# Patient Record
Sex: FEMALE | Race: WHITE | NOT HISPANIC OR LATINO | Employment: OTHER | URBAN - NONMETROPOLITAN AREA
[De-identification: names, ages, dates, MRNs, and addresses within clinical notes are randomized per-mention and may not be internally consistent; named-entity substitution may affect disease eponyms.]

---

## 2018-02-16 ENCOUNTER — CONSULT (OUTPATIENT)
Dept: GASTROENTEROLOGY | Facility: CLINIC | Age: 73
End: 2018-02-16

## 2018-02-16 VITALS
HEART RATE: 80 BPM | SYSTOLIC BLOOD PRESSURE: 174 MMHG | BODY MASS INDEX: 40.27 KG/M2 | OXYGEN SATURATION: 97 % | DIASTOLIC BLOOD PRESSURE: 93 MMHG | HEIGHT: 55 IN | WEIGHT: 174 LBS

## 2018-02-16 DIAGNOSIS — K21.9 GASTROESOPHAGEAL REFLUX DISEASE, ESOPHAGITIS PRESENCE NOT SPECIFIED: ICD-10-CM

## 2018-02-16 DIAGNOSIS — D50.9 IRON DEFICIENCY ANEMIA, UNSPECIFIED IRON DEFICIENCY ANEMIA TYPE: Primary | ICD-10-CM

## 2018-02-16 DIAGNOSIS — R10.817 GENERALIZED ABDOMINAL TENDERNESS WITHOUT REBOUND TENDERNESS: ICD-10-CM

## 2018-02-16 DIAGNOSIS — Z86.19 HISTORY OF HELICOBACTER PYLORI INFECTION: ICD-10-CM

## 2018-02-16 DIAGNOSIS — R19.5 POSITIVE FIT (FECAL IMMUNOCHEMICAL TEST): ICD-10-CM

## 2018-02-16 DIAGNOSIS — R19.8 ABNORMAL ABDOMINAL EXAM: ICD-10-CM

## 2018-02-16 PROCEDURE — 99204 OFFICE O/P NEW MOD 45 MIN: CPT | Performed by: PHYSICIAN ASSISTANT

## 2018-02-16 RX ORDER — NAPROXEN 500 MG/1
500 TABLET ORAL 2 TIMES DAILY WITH MEALS
COMMUNITY
End: 2023-03-30 | Stop reason: ALTCHOICE

## 2018-02-16 RX ORDER — OMEPRAZOLE 20 MG/1
20 CAPSULE, DELAYED RELEASE ORAL DAILY
COMMUNITY
End: 2023-03-30 | Stop reason: DRUGHIGH

## 2018-02-16 RX ORDER — LORATADINE 10 MG/1
CAPSULE, LIQUID FILLED ORAL DAILY
COMMUNITY

## 2018-02-16 RX ORDER — METOPROLOL TARTRATE 50 MG/1
50 TABLET, FILM COATED ORAL 2 TIMES DAILY
COMMUNITY
End: 2023-03-30 | Stop reason: ALTCHOICE

## 2018-02-16 RX ORDER — CHOLECALCIFEROL (VITAMIN D3) 125 MCG
500 CAPSULE ORAL DAILY
COMMUNITY

## 2018-02-16 RX ORDER — LEVOTHYROXINE SODIUM 0.03 MG/1
25 TABLET ORAL DAILY
COMMUNITY

## 2018-02-16 RX ORDER — PHENOL 1.4 %
600 AEROSOL, SPRAY (ML) MUCOUS MEMBRANE DAILY
COMMUNITY
End: 2023-03-30 | Stop reason: ALTCHOICE

## 2018-02-16 RX ORDER — ASPIRIN 325 MG
325 TABLET ORAL DAILY
COMMUNITY

## 2018-02-16 RX ORDER — AMLODIPINE BESYLATE 5 MG/1
5 TABLET ORAL DAILY
COMMUNITY
End: 2023-03-30

## 2018-02-16 RX ORDER — FERROUS SULFATE 325(65) MG
325 TABLET ORAL
COMMUNITY

## 2018-02-16 RX ORDER — SIMVASTATIN 20 MG
20 TABLET ORAL NIGHTLY
COMMUNITY
End: 2023-03-30 | Stop reason: SDUPTHER

## 2018-02-16 RX ORDER — NIACIN 500 MG
500 TABLET ORAL NIGHTLY
COMMUNITY

## 2018-02-16 RX ORDER — AMPICILLIN TRIHYDRATE 250 MG
500 CAPSULE ORAL DAILY
COMMUNITY

## 2018-02-16 RX ORDER — ALENDRONATE SODIUM 70 MG/1
70 TABLET ORAL
COMMUNITY
Start: 2018-01-17

## 2018-02-16 RX ORDER — MONTELUKAST SODIUM 10 MG/1
10 TABLET ORAL NIGHTLY
COMMUNITY

## 2018-02-16 RX ORDER — OXYBUTYNIN CHLORIDE 5 MG/1
5 TABLET ORAL DAILY
COMMUNITY

## 2018-02-16 RX ORDER — CAPTOPRIL 12.5 MG/1
12.5 TABLET ORAL DAILY
COMMUNITY
Start: 2018-01-17 | End: 2023-03-30 | Stop reason: SDUPTHER

## 2018-02-16 RX ORDER — CALCIUM CARBONATE 500(1250)
TABLET ORAL DAILY
COMMUNITY

## 2018-02-16 NOTE — PROGRESS NOTES
Chief Complaint   Patient presents with   • Colon Cancer Screening     positive stool cards     Aggie Perkins is a 72 y.o. female who presents to the office today as a consultation at the request of JELENA Tai for Colon Cancer Screening (positive stool cards).    HPI  Today, she reports positive stool cards (FIT test) with her PCP in November 2017. She has not seen any obvious rectal bleeding and no melena. Appetite is good. No hemorrhoids that she is aware of. There is history of anemia and she is now taking iron. No history of blood transfusion. BMs are described as daily and normal. Denies abdominal pain, nausea or vomiting. Reports GERD and she takes Prilosec 20 mg once daily which seems to be controlling it. Last colonoscopy was 2-3 years ago. EGD was prior to that. She has had H pylori in the past and underwent treatment for it. The procedure was performed by Dr. Venegas and she reports history of colon polyps. There is no known family history of colon cancer or colon polyps.    No recent abdominal imaging. S/p cholecystectomy in 1980. Had maternal niece who had pancreatic cancer.     Review of Systems   Constitutional: Negative for chills and fever.   HENT: Negative for trouble swallowing.    Eyes: Negative for visual disturbance.   Respiratory: Negative for shortness of breath.    Cardiovascular: Negative for chest pain.   Gastrointestinal: Negative for abdominal distention, abdominal pain, anal bleeding, blood in stool, constipation, diarrhea, nausea and vomiting.   Genitourinary: Negative for difficulty urinating.   Neurological: Negative for dizziness and headaches.   Hematological: Bruises/bleeds easily.     Past Medical History:   Diagnosis Date   • Diabetes mellitus    • Hyperlipidemia    • Hypertension      Past Surgical History:   Procedure Laterality Date   • CHOLECYSTECTOMY     • COLONOSCOPY     • ENDOSCOPY     • HYSTERECTOMY       Family History   Problem Relation Age of Onset   • Colon cancer  Mother    • Colon cancer Father    • Colon cancer Brother      Social History   Substance Use Topics   • Smoking status: Never Smoker   • Smokeless tobacco: Never Used   • Alcohol use No       CURRENT MEDICATION:  •  alendronate (FOSAMAX) 70 MG tablet, , Disp: , Rfl:   •  amLODIPine (NORVASC) 5 MG tablet, Take 5 mg by mouth Daily., Disp: , Rfl:   •  aspirin 325 MG tablet, Take 325 mg by mouth Daily., Disp: , Rfl:   •  calcium carbonate (OS-MAXIMILIANO) 600 MG tablet, Take 600 mg by mouth Daily., Disp: , Rfl:   •  calcium carbonate 500 MG tablet tablet, Take  by mouth 2 (Two) Times a Day., Disp: , Rfl:   •  captopril (CAPOTEN) 12.5 MG tablet, , Disp: , Rfl:   •  Cinnamon 500 MG capsule, Take 500 mg by mouth Daily., Disp: , Rfl:   •  ferrous sulfate 325 (65 FE) MG tablet, Take 325 mg by mouth Daily With Breakfast., Disp: , Rfl:   •  levothyroxine (SYNTHROID, LEVOTHROID) 25 MCG tablet, Take 25 mcg by mouth Daily., Disp: , Rfl:   •  Loratadine 10 MG capsule, Take  by mouth., Disp: , Rfl:   •  metFORMIN (GLUCOPHAGE) 1000 MG tablet, Take 1,000 mg by mouth 2 (Two) Times a Day With Meals., Disp: , Rfl:   •  metoprolol tartrate (LOPRESSOR) 50 MG tablet, Take 50 mg by mouth 2 (Two) Times a Day., Disp: , Rfl:   •  montelukast (SINGULAIR) 10 MG tablet, Take 10 mg by mouth Every Night., Disp: , Rfl:   •  naproxen (NAPROSYN) 500 MG tablet, Take 500 mg by mouth 2 (Two) Times a Day With Meals., Disp: , Rfl:   •  niacin 500 MG tablet, Take 500 mg by mouth Every Night., Disp: , Rfl:   •  omeprazole (priLOSEC) 20 MG capsule, Take 20 mg by mouth Daily., Disp: , Rfl:   •  oxybutynin (DITROPAN) 5 MG tablet, Take 5 mg by mouth 3 (Three) Times a Day., Disp: , Rfl:   •  simvastatin (ZOCOR) 20 MG tablet, Take 20 mg by mouth Every Night., Disp: , Rfl:   •  vitamin B-12 (CYANOCOBALAMIN) 500 MCG tablet, Take 500 mcg by mouth Daily., Disp: , Rfl:     ALLERGIES:  Review of patient's allergies indicates no known allergies.    VISIT VITALS:  /93   "Pulse 80  Ht 134.6 cm (53\")  Wt 78.9 kg (174 lb)  SpO2 97%  BMI 43.55 kg/m2    Physical Exam   Constitutional: She is oriented to person, place, and time. She appears well-developed and well-nourished. No distress.   HENT:   Head: Normocephalic and atraumatic.   Right Ear: External ear normal.   Left Ear: External ear normal.   Nose: Nose normal.   Mouth/Throat: Oropharynx is clear and moist.   Eyes: Conjunctivae and EOM are normal. Right eye exhibits no discharge. Left eye exhibits no discharge. No scleral icterus.   Neck: Normal range of motion. Neck supple.   Cardiovascular: Normal rate, regular rhythm and normal heart sounds.  Exam reveals no gallop and no friction rub.    No murmur heard.  Pulmonary/Chest: Effort normal and breath sounds normal. No respiratory distress. She has no wheezes. She has no rales. She exhibits no tenderness.   Abdominal: Soft. Normal appearance and bowel sounds are normal. She exhibits no distension, no ascites and no mass. There is tenderness (tenderness, generalized (worst in LUQ)). There is no rigidity and no guarding. No hernia.   Abnormal swelling palpated in LUQ, possible splenomegaly   Musculoskeletal: Normal range of motion. She exhibits no edema or deformity.   Neurological: She is alert and oriented to person, place, and time. She exhibits normal muscle tone. Coordination normal.   Skin: Skin is warm and dry. No rash noted. No erythema. No pallor.   Psychiatric: She has a normal mood and affect. Her behavior is normal. Judgment and thought content normal.   Nursing note and vitals reviewed.      Assessment/Plan     1. Iron deficiency anemia, unspecified iron deficiency anemia type    2. Positive FIT (fecal immunochemical test)    3. Gastroesophageal reflux disease, esophagitis presence not specified    4. History of Helicobacter pylori infection    5. Generalized abdominal tenderness without rebound tenderness    6. Abnormal abdominal exam      Continue Prilosec 20 mg " once daily for management of GERD.     Orders Placed This Encounter   Procedures   • US Abdomen Complete     New Medications Ordered This Visit   Medications   • polyethylene glycol (GoLYTELY) 236 g solution     Sig: Starting at 6pm the day before procedure, drink 8 ounces every 30 minutes until all gone or stools are clear. May add flavor packet.     Dispense:  4000 mL     Refill:  0     ESOPHAGOGASTRODUODENOSCOPY WITH BIOPSY CPT CODE: 17644 (N/A), COLONOSCOPY CPT CODE: 62778 (N/A)  She will need an esophagogastroduodenoscopy and colonoscopy performed with IV general sedation. All of the risks, benefits and alternatives of these procedures have been discussed with her, all of her questions have been answered and she has elected to proceed. She should follow up in the office after these procedures to discuss the results and further recommendations can be made at that time.    Patient's BMI is above normal parameters. Follow-up plan includes:  educational material.        Return for follow up after procedure to discuss results if needed.      Electronically signed 2/19/2018 9:23 AM  Delia Henderson PA-C, [facility]

## 2018-02-19 PROBLEM — R19.5 POSITIVE FIT (FECAL IMMUNOCHEMICAL TEST): Status: ACTIVE | Noted: 2018-02-19

## 2018-02-19 PROBLEM — Z86.19 HISTORY OF HELICOBACTER PYLORI INFECTION: Status: ACTIVE | Noted: 2018-02-19

## 2018-02-19 PROBLEM — K21.9 GASTROESOPHAGEAL REFLUX DISEASE: Status: ACTIVE | Noted: 2018-02-19

## 2018-02-19 PROBLEM — D50.9 IRON DEFICIENCY ANEMIA: Status: ACTIVE | Noted: 2018-02-19

## 2018-03-13 DIAGNOSIS — R19.8 ABNORMAL ABDOMINAL EXAM: ICD-10-CM

## 2018-03-13 DIAGNOSIS — R10.817 GENERALIZED ABDOMINAL TENDERNESS WITHOUT REBOUND TENDERNESS: ICD-10-CM

## 2018-03-23 ENCOUNTER — ANESTHESIA (OUTPATIENT)
Dept: PERIOP | Facility: HOSPITAL | Age: 73
End: 2018-03-23

## 2018-03-23 ENCOUNTER — HOSPITAL ENCOUNTER (OUTPATIENT)
Facility: HOSPITAL | Age: 73
Setting detail: HOSPITAL OUTPATIENT SURGERY
Discharge: HOME | End: 2018-03-23
Attending: INTERNAL MEDICINE | Admitting: INTERNAL MEDICINE

## 2018-03-23 ENCOUNTER — ANESTHESIA EVENT (OUTPATIENT)
Dept: PERIOP | Facility: HOSPITAL | Age: 73
End: 2018-03-23

## 2018-03-23 VITALS
TEMPERATURE: 97.7 F | DIASTOLIC BLOOD PRESSURE: 78 MMHG | OXYGEN SATURATION: 94 % | SYSTOLIC BLOOD PRESSURE: 135 MMHG | HEART RATE: 88 BPM | HEIGHT: 57 IN | RESPIRATION RATE: 20 BRPM | WEIGHT: 170 LBS | BODY MASS INDEX: 36.68 KG/M2

## 2018-03-23 DIAGNOSIS — Z86.19 HISTORY OF HELICOBACTER PYLORI INFECTION: ICD-10-CM

## 2018-03-23 DIAGNOSIS — K21.9 GASTROESOPHAGEAL REFLUX DISEASE, ESOPHAGITIS PRESENCE NOT SPECIFIED: ICD-10-CM

## 2018-03-23 DIAGNOSIS — D50.9 IRON DEFICIENCY ANEMIA, UNSPECIFIED IRON DEFICIENCY ANEMIA TYPE: ICD-10-CM

## 2018-03-23 DIAGNOSIS — R19.5 POSITIVE FIT (FECAL IMMUNOCHEMICAL TEST): ICD-10-CM

## 2018-03-23 PROCEDURE — 88305 TISSUE EXAM BY PATHOLOGIST: CPT | Performed by: INTERNAL MEDICINE

## 2018-03-23 PROCEDURE — 43239 EGD BIOPSY SINGLE/MULTIPLE: CPT | Performed by: INTERNAL MEDICINE

## 2018-03-23 PROCEDURE — 25010000002 PROPOFOL 10 MG/ML EMULSION: Performed by: NURSE ANESTHETIST, CERTIFIED REGISTERED

## 2018-03-23 PROCEDURE — 88342 IMHCHEM/IMCYTCHM 1ST ANTB: CPT | Performed by: INTERNAL MEDICINE

## 2018-03-23 PROCEDURE — 45378 DIAGNOSTIC COLONOSCOPY: CPT | Performed by: INTERNAL MEDICINE

## 2018-03-23 PROCEDURE — 25010000002 PROPOFOL 1000 MG/ML EMULSION: Performed by: NURSE ANESTHETIST, CERTIFIED REGISTERED

## 2018-03-23 RX ORDER — SODIUM CHLORIDE, SODIUM LACTATE, POTASSIUM CHLORIDE, CALCIUM CHLORIDE 600; 310; 30; 20 MG/100ML; MG/100ML; MG/100ML; MG/100ML
125 INJECTION, SOLUTION INTRAVENOUS CONTINUOUS
Status: DISCONTINUED | OUTPATIENT
Start: 2018-03-23 | End: 2018-03-23 | Stop reason: HOSPADM

## 2018-03-23 RX ORDER — PROPOFOL 10 MG/ML
VIAL (ML) INTRAVENOUS AS NEEDED
Status: DISCONTINUED | OUTPATIENT
Start: 2018-03-23 | End: 2018-03-23 | Stop reason: SURG

## 2018-03-23 RX ORDER — ONDANSETRON 2 MG/ML
4 INJECTION INTRAMUSCULAR; INTRAVENOUS ONCE AS NEEDED
Status: DISCONTINUED | OUTPATIENT
Start: 2018-03-23 | End: 2018-03-23 | Stop reason: SDUPTHER

## 2018-03-23 RX ORDER — OXYCODONE HYDROCHLORIDE AND ACETAMINOPHEN 5; 325 MG/1; MG/1
1 TABLET ORAL ONCE AS NEEDED
Status: DISCONTINUED | OUTPATIENT
Start: 2018-03-23 | End: 2018-03-23 | Stop reason: SDUPTHER

## 2018-03-23 RX ORDER — GLYCOPYRROLATE 0.2 MG/ML
INJECTION INTRAMUSCULAR; INTRAVENOUS AS NEEDED
Status: DISCONTINUED | OUTPATIENT
Start: 2018-03-23 | End: 2018-03-23 | Stop reason: SURG

## 2018-03-23 RX ORDER — MIDAZOLAM HYDROCHLORIDE 1 MG/ML
2 INJECTION INTRAMUSCULAR; INTRAVENOUS
Status: DISCONTINUED | OUTPATIENT
Start: 2018-03-23 | End: 2018-03-23 | Stop reason: HOSPADM

## 2018-03-23 RX ORDER — OXYCODONE HYDROCHLORIDE AND ACETAMINOPHEN 5; 325 MG/1; MG/1
1 TABLET ORAL ONCE AS NEEDED
Status: DISCONTINUED | OUTPATIENT
Start: 2018-03-23 | End: 2018-03-23 | Stop reason: HOSPADM

## 2018-03-23 RX ORDER — LIDOCAINE HYDROCHLORIDE 10 MG/ML
INJECTION, SOLUTION INFILTRATION; PERINEURAL AS NEEDED
Status: DISCONTINUED | OUTPATIENT
Start: 2018-03-23 | End: 2018-03-23 | Stop reason: SURG

## 2018-03-23 RX ORDER — MEPERIDINE HYDROCHLORIDE 50 MG/ML
12.5 INJECTION INTRAMUSCULAR; INTRAVENOUS; SUBCUTANEOUS
Status: DISCONTINUED | OUTPATIENT
Start: 2018-03-23 | End: 2018-03-23 | Stop reason: SDUPTHER

## 2018-03-23 RX ORDER — FENTANYL CITRATE 50 UG/ML
50 INJECTION, SOLUTION INTRAMUSCULAR; INTRAVENOUS
Status: DISCONTINUED | OUTPATIENT
Start: 2018-03-23 | End: 2018-03-23 | Stop reason: SDUPTHER

## 2018-03-23 RX ORDER — SODIUM CHLORIDE 0.9 % (FLUSH) 0.9 %
1-10 SYRINGE (ML) INJECTION AS NEEDED
Status: DISCONTINUED | OUTPATIENT
Start: 2018-03-23 | End: 2018-03-23 | Stop reason: HOSPADM

## 2018-03-23 RX ORDER — MIDAZOLAM HYDROCHLORIDE 1 MG/ML
1 INJECTION INTRAMUSCULAR; INTRAVENOUS
Status: DISCONTINUED | OUTPATIENT
Start: 2018-03-23 | End: 2018-03-23 | Stop reason: HOSPADM

## 2018-03-23 RX ORDER — ONDANSETRON 2 MG/ML
4 INJECTION INTRAMUSCULAR; INTRAVENOUS ONCE AS NEEDED
Status: DISCONTINUED | OUTPATIENT
Start: 2018-03-23 | End: 2018-03-23 | Stop reason: HOSPADM

## 2018-03-23 RX ORDER — MEPERIDINE HYDROCHLORIDE 50 MG/ML
12.5 INJECTION INTRAMUSCULAR; INTRAVENOUS; SUBCUTANEOUS
Status: DISCONTINUED | OUTPATIENT
Start: 2018-03-23 | End: 2018-03-23 | Stop reason: HOSPADM

## 2018-03-23 RX ORDER — IPRATROPIUM BROMIDE AND ALBUTEROL SULFATE 2.5; .5 MG/3ML; MG/3ML
3 SOLUTION RESPIRATORY (INHALATION) ONCE AS NEEDED
Status: DISCONTINUED | OUTPATIENT
Start: 2018-03-23 | End: 2018-03-23 | Stop reason: SDUPTHER

## 2018-03-23 RX ORDER — FENTANYL CITRATE 50 UG/ML
50 INJECTION, SOLUTION INTRAMUSCULAR; INTRAVENOUS
Status: DISCONTINUED | OUTPATIENT
Start: 2018-03-23 | End: 2018-03-23 | Stop reason: HOSPADM

## 2018-03-23 RX ORDER — IPRATROPIUM BROMIDE AND ALBUTEROL SULFATE 2.5; .5 MG/3ML; MG/3ML
3 SOLUTION RESPIRATORY (INHALATION) ONCE AS NEEDED
Status: DISCONTINUED | OUTPATIENT
Start: 2018-03-23 | End: 2018-03-23 | Stop reason: HOSPADM

## 2018-03-23 RX ADMIN — SODIUM CHLORIDE, POTASSIUM CHLORIDE, SODIUM LACTATE AND CALCIUM CHLORIDE: 600; 310; 30; 20 INJECTION, SOLUTION INTRAVENOUS at 08:22

## 2018-03-23 RX ADMIN — PROPOFOL 100 MG: 10 INJECTION, EMULSION INTRAVENOUS at 08:22

## 2018-03-23 RX ADMIN — LIDOCAINE HYDROCHLORIDE 60 MG: 10 INJECTION, SOLUTION INFILTRATION; PERINEURAL at 08:22

## 2018-03-23 RX ADMIN — GLYCOPYRROLATE 0.2 MG: 0.2 INJECTION, SOLUTION INTRAMUSCULAR; INTRAVENOUS at 08:21

## 2018-03-23 RX ADMIN — PROPOFOL 100 MCG/KG/MIN: 10 INJECTION, EMULSION INTRAVENOUS at 08:22

## 2018-03-23 NOTE — H&P
History of presenting illness: 72-year-old white female presents for EGD and colonoscopy in order to investigate iron deficiency anemia, positive stool testing for occult blood.  She has history of GERD and colonic polyps.  Colonoscopy was last performed about 3 years ago.    Review of Systems:   Negative and noncontributory    Past History:  Past Medical History:   Diagnosis Date   • Arthritis    • Diabetes mellitus    • Disease of thyroid gland    • Hyperlipidemia    • Hypertension      Past Surgical History:   Procedure Laterality Date   • CHOLECYSTECTOMY     • COLONOSCOPY  2015   • ENDOSCOPY     • HYSTERECTOMY       Family History   Problem Relation Age of Onset   • Colon cancer Mother    • Colon cancer Father    • Colon cancer Brother      Social History     Social History   • Marital status: Single     Social History Main Topics   • Smoking status: Never Smoker   • Smokeless tobacco: Never Used   • Alcohol use No   • Drug use: No   • Sexual activity: Defer     Other Topics Concern   • Not on file     Prior to Admission medications    Medication Sig Start Date End Date Taking? Authorizing Provider   alendronate (FOSAMAX) 70 MG tablet  1/17/18  Yes Historical Provider, MD   amLODIPine (NORVASC) 5 MG tablet Take 5 mg by mouth Daily.   Yes Historical Provider, MD   aspirin 325 MG tablet Take 325 mg by mouth Daily.   Yes Historical Provider, MD   calcium carbonate (OS-MAXIMILIANO) 600 MG tablet Take 600 mg by mouth Daily.   Yes Historical Provider, MD   calcium carbonate 500 MG tablet tablet Take  by mouth 2 (Two) Times a Day.   Yes Historical Provider, MD   captopril (CAPOTEN) 12.5 MG tablet  1/17/18  Yes Historical Provider, MD   Cinnamon 500 MG capsule Take 500 mg by mouth Daily.   Yes Historical Provider, MD   ferrous sulfate 325 (65 FE) MG tablet Take 325 mg by mouth Daily With Breakfast.   Yes Historical Provider, MD   levothyroxine (SYNTHROID, LEVOTHROID) 25 MCG tablet Take 25 mcg by mouth Daily.   Yes Historical  Provider, MD   Loratadine 10 MG capsule Take  by mouth.   Yes Historical Provider, MD   metFORMIN (GLUCOPHAGE) 1000 MG tablet Take 1,000 mg by mouth 2 (Two) Times a Day With Meals.   Yes Historical Provider, MD   metoprolol tartrate (LOPRESSOR) 50 MG tablet Take 50 mg by mouth 2 (Two) Times a Day.   Yes Historical Provider, MD   montelukast (SINGULAIR) 10 MG tablet Take 10 mg by mouth Every Night.   Yes Historical Provider, MD   naproxen (NAPROSYN) 500 MG tablet Take 500 mg by mouth 2 (Two) Times a Day With Meals.   Yes Historical Provider, MD   niacin 500 MG tablet Take 500 mg by mouth Every Night.   Yes Historical Provider, MD   omeprazole (priLOSEC) 20 MG capsule Take 20 mg by mouth Daily.   Yes Historical Provider, MD   oxybutynin (DITROPAN) 5 MG tablet Take 5 mg by mouth 3 (Three) Times a Day.   Yes Historical Provider, MD   polyethylene glycol (GoLYTELY) 236 g solution Starting at 6pm the day before procedure, drink 8 ounces every 30 minutes until all gone or stools are clear. May add flavor packet. 2/16/18  Yes Delia Henderson PA-C   simvastatin (ZOCOR) 20 MG tablet Take 20 mg by mouth Every Night.   Yes Historical Provider, MD   vitamin B-12 (CYANOCOBALAMIN) 500 MCG tablet Take 500 mcg by mouth Daily.   Yes Historical Provider, MD       Current medication:  I have reviewed the list of current medications.    Allergies:   Review of patient's allergies indicates no known allergies.    Vital Signs  Temp:  [98.2 °F (36.8 °C)] 98.2 °F (36.8 °C)  Heart Rate:  [81] 81  Resp:  [20] 20  BP: (143)/(63) 143/63    Physical exam:  Alert, oriented ×3  HEENT: Normal  Neck: No mass  Chest: Clear  Heart: Regular rhythm  Abdomen: Soft, nontender, active BS  Extremities: No edema  Neuro: No focal deficit    Assessment/Plan:   Iron deficiency anemia  Positive stool testing for occult blood  GERD  History of colon polyp  Colon cancer screening    REC  EGD and screening/surveillance colonoscopy are planned.  The procedures,  benefits, risks and alternatives have been discussed with the patient.      Rinku Urias III, MD  03/23/18  8:14 AM

## 2018-03-23 NOTE — ANESTHESIA POSTPROCEDURE EVALUATION
Patient: Aggie Perkins    Procedure Summary     Date:  03/23/18 Room / Location:  [facility] OR [address] [place] [address] COR OR    Anesthesia Start:  0820 Anesthesia Stop:  0838    Procedures:       ESOPHAGOGASTRODUODENOSCOPY WITH BIOPSY CPT CODE: 60277 (N/A Esophagus)      COLONOSCOPY CPT CODE: 83183 (N/A ) Diagnosis:       History of Helicobacter pylori infection      Positive FIT (fecal immunochemical test)      Gastroesophageal reflux disease, esophagitis presence not specified      Iron deficiency anemia, unspecified iron deficiency anemia type      (History of Helicobacter pylori infection [Z86.19])      (Positive FIT (fecal immunochemical test) [R19.5])      (Gastroesophageal reflux disease, esophagitis presence not specified [K21.9])      (Iron deficiency anemia, unspecified iron deficiency anemia type [D50.9])    Surgeon:  Rinku Urias III, MD Provider:  John Thomas MD    Anesthesia Type:  general ASA Status:  3          Anesthesia Type: general  Last vitals  BP   120/69 (03/23/18 0850)   Temp   97.7 °F (36.5 °C) (03/23/18 0840)   Pulse   83 (03/23/18 0850)   Resp   20 (03/23/18 0850)     SpO2   97 % (03/23/18 0850)     Post Anesthesia Care and Evaluation    Patient location during evaluation: bedside  Patient participation: complete - patient participated  Level of consciousness: awake and alert  Pain score: 1  Pain management: adequate  Airway patency: patent  Anesthetic complications: No anesthetic complications  PONV Status: none  Cardiovascular status: acceptable  Respiratory status: acceptable  Hydration status: acceptable

## 2018-03-23 NOTE — OP NOTE
03/23/18    ESOPHAGOGASTRODUODENOSCOPY WITH BIOPSY, COLONOSCOPY  Procedure Note    Aggie Perkins  3/23/2018    Dr. Urias      Pre-op Diagnosis: Iron deficiency anemia, positive stool test for occult blood, GERD, history of colon polyps, colon cancer screening, last colonoscopy performed about 3 years ago      Post-op Diagnosis:   -Normal EGD  -Normal colonoscopy        Anesthesia: Per Anesthesia service, general anesthesia      Estimated Blood Loss: Negligible      Findings: EGD was performed with careful examination of the esophagus, stomach, duodenum to the fourth portion.  Question of minimal benign Garcia's mucosa in the distal esophagus at the EG junction--biopsies were obtained from the EG junction.  Aside from this, the examination was normal.  Biopsies were taken from the gastric antrum in order to check for H. pylori.    Rectal rectal examination.  Colonoscopy performed to the cecum, confirmed by identification of typical cecal anatomy.  Bowel preparation was fair but adequate to.  All areas were examined carefully.  The scope was retroflexed within the rectum.  No abnormality was seen.    She tolerated the procedures well and there were no complications.  She left the OR in stable and satisfactory condition.      Specimens: EG junction, gastric antrum      Grafts/Implants: N/A      Complications: None      Recommendations:   Findings discussed with the patient  Await biopsy findings  Repeat screening/surveillance colonoscopy in about 5 years      Rinku Urias III, MD     Date: 3/23/2018  Time: 8:43 AM     MIKE-Jo BOWLES

## 2018-03-23 NOTE — ANESTHESIA PREPROCEDURE EVALUATION
Anesthesia Evaluation     no history of anesthetic complications:  NPO Solid Status: > 8 hours  NPO Liquid Status: > 8 hours           Airway   Mallampati: II  TM distance: >3 FB  Neck ROM: full  No difficulty expected  Dental - normal exam   (+) upper dentures and lower dentures    Pulmonary - normal exam   Cardiovascular - normal exam    (+) hypertension, hyperlipidemia,       Neuro/Psych  GI/Hepatic/Renal/Endo    (+)  GERD,  diabetes mellitus type 2 well controlled,     Musculoskeletal     Abdominal  - normal exam   Substance History      OB/GYN          Other                        Anesthesia Plan    ASA 3     general     intravenous induction   Anesthetic plan and risks discussed with patient.

## 2018-03-28 LAB
LAB AP CASE REPORT: NORMAL
Lab: NORMAL
PATH REPORT.FINAL DX SPEC: NORMAL

## 2018-04-06 ENCOUNTER — OFFICE VISIT (OUTPATIENT)
Dept: GASTROENTEROLOGY | Facility: CLINIC | Age: 73
End: 2018-04-06

## 2018-04-06 VITALS
OXYGEN SATURATION: 92 % | BODY MASS INDEX: 37.88 KG/M2 | WEIGHT: 175.6 LBS | HEIGHT: 57 IN | SYSTOLIC BLOOD PRESSURE: 149 MMHG | HEART RATE: 78 BPM | DIASTOLIC BLOOD PRESSURE: 70 MMHG

## 2018-04-06 DIAGNOSIS — D50.9 IRON DEFICIENCY ANEMIA, UNSPECIFIED IRON DEFICIENCY ANEMIA TYPE: Primary | ICD-10-CM

## 2018-04-06 PROCEDURE — 99213 OFFICE O/P EST LOW 20 MIN: CPT | Performed by: PHYSICIAN ASSISTANT

## 2018-04-06 NOTE — PROGRESS NOTES
"Chief Complaint   Patient presents with   • Other     procedure follow up       Aggie Perkins is a 72 y.o. female who presents to the office today for follow up appointment regarding recent procedure.     HPI  She had EGD and colonoscopy recently which were performed by Dr. Urias on 3/23/2018 which were both reported as normal other than, \"question of minimal benign Garcia's mucosa in the distal esophagus at the EG junction.\"    Had US abd on 3/8/2018 in [place] which was normal other than previous cholecystectomy. Had positive stool cards (FIT test) with her PCP in November 2017. She has not seen any obvious rectal bleeding and no melena. Appetite is good. No hemorrhoids that she is aware of. There is history of anemia and she is now taking iron. No history of blood transfusion. BMs are described as daily and normal. Denies abdominal pain, nausea or vomiting. Reports GERD and she takes Prilosec 20 mg once daily which seems to be controlling it.     Previous colonoscopy was 2-3 years ago. EGD was prior to that. She has had H pylori in the past and underwent treatment for it. The procedure was performed by Dr. Venegas and she reports history of colon polyps. There is no known family history of colon cancer or colon polyps. S/p cholecystectomy in 1980. Had maternal niece who had pancreatic cancer.     Review of Systems   Constitutional: Positive for fatigue. Negative for chills and fever.   HENT: Negative for trouble swallowing.    Eyes: Negative for visual disturbance.   Respiratory: Negative for shortness of breath.    Cardiovascular: Negative for chest pain.   Gastrointestinal: Negative for abdominal distention, abdominal pain, anal bleeding, blood in stool, constipation, diarrhea, nausea and vomiting.   Genitourinary: Negative for difficulty urinating.   Neurological: Negative for dizziness and headaches.   Hematological: Does not bruise/bleed easily.       Specialty Problems        Gastroenterology Problems    " Gastroesophageal reflux disease            Past Medical History:   Diagnosis Date   • Arthritis    • Diabetes mellitus    • Disease of thyroid gland    • Hyperlipidemia    • Hypertension      Past Surgical History:   Procedure Laterality Date   • CHOLECYSTECTOMY     • COLONOSCOPY  2015   • COLONOSCOPY N/A 3/23/2018    Procedure: COLONOSCOPY CPT CODE: 67989;  Surgeon: Rinku Urias III, MD;  Location: [facility];  Service: Gastroenterology   • ENDOSCOPY     • ENDOSCOPY N/A 3/23/2018    Procedure: ESOPHAGOGASTRODUODENOSCOPY WITH BIOPSY CPT CODE: 40750;  Surgeon: Rinku Urias III, MD;  Location: [facility] OR;  Service: Gastroenterology   • HYSTERECTOMY       Family History   Problem Relation Age of Onset   • Colon cancer Mother    • Colon cancer Father    • Colon cancer Brother      Social History   Substance Use Topics   • Smoking status: Never Smoker   • Smokeless tobacco: Never Used   • Alcohol use No       CURRENT MEDICATION:  •  alendronate (FOSAMAX) 70 MG tablet, , Disp: , Rfl:   •  amLODIPine (NORVASC) 5 MG tablet, Take 5 mg by mouth Daily., Disp: , Rfl:   •  aspirin 325 MG tablet, Take 325 mg by mouth Daily., Disp: , Rfl:   •  calcium carbonate (OS-MAXIMILIANO) 600 MG tablet, Take 600 mg by mouth Daily., Disp: , Rfl:   •  calcium carbonate 500 MG tablet tablet, Take  by mouth 2 (Two) Times a Day., Disp: , Rfl:   •  captopril (CAPOTEN) 12.5 MG tablet, , Disp: , Rfl:   •  Cinnamon 500 MG capsule, Take 500 mg by mouth Daily., Disp: , Rfl:   •  ferrous sulfate 325 (65 FE) MG tablet, Take 325 mg by mouth Daily With Breakfast., Disp: , Rfl:   •  levothyroxine (SYNTHROID, LEVOTHROID) 25 MCG tablet, Take 25 mcg by mouth Daily., Disp: , Rfl:   •  Loratadine 10 MG capsule, Take  by mouth., Disp: , Rfl:   •  metFORMIN (GLUCOPHAGE) 1000 MG tablet, Take 1,000 mg by mouth 2 (Two) Times a Day With Meals., Disp: , Rfl:   •  metoprolol tartrate (LOPRESSOR) 50 MG tablet, Take 50 mg by mouth 2 (Two) Times a Day., Disp: , Rfl:  "  •  montelukast (SINGULAIR) 10 MG tablet, Take 10 mg by mouth Every Night., Disp: , Rfl:   •  naproxen (NAPROSYN) 500 MG tablet, Take 500 mg by mouth 2 (Two) Times a Day With Meals., Disp: , Rfl:   •  niacin 500 MG tablet, Take 500 mg by mouth Every Night., Disp: , Rfl:   •  omeprazole (priLOSEC) 20 MG capsule, Take 20 mg by mouth Daily., Disp: , Rfl:   •  oxybutynin (DITROPAN) 5 MG tablet, Take 5 mg by mouth 3 (Three) Times a Day., Disp: , Rfl:   •  polyethylene glycol (GoLYTELY) 236 g solution, Starting at 6pm the day before procedure, drink 8 ounces every 30 minutes until all gone or stools are clear. May add flavor packet., Disp: 4000 mL, Rfl: 0  •  simvastatin (ZOCOR) 20 MG tablet, Take 20 mg by mouth Every Night., Disp: , Rfl:   •  vitamin B-12 (CYANOCOBALAMIN) 500 MCG tablet, Take 500 mcg by mouth Daily., Disp: , Rfl:     ALLERGIES:  Review of patient's allergies indicates no known allergies.    VISIT VITALS:  /70   Pulse 78   Ht 144.8 cm (57\")   Wt 79.7 kg (175 lb 9.6 oz)   SpO2 92%   BMI 38.00 kg/m²     Physical Exam   Constitutional: She is oriented to person, place, and time. She appears well-developed and well-nourished. No distress.   HENT:   Head: Normocephalic and atraumatic.   Right Ear: External ear normal.   Left Ear: External ear normal.   Nose: Nose normal.   Mouth/Throat: Oropharynx is clear and moist.   Eyes: Conjunctivae and EOM are normal. Right eye exhibits no discharge. Left eye exhibits no discharge. No scleral icterus.   Neck: Normal range of motion. Neck supple.   Cardiovascular: Normal rate, regular rhythm and normal heart sounds.  Exam reveals no gallop and no friction rub.    No murmur heard.  Pulmonary/Chest: Effort normal and breath sounds normal. No respiratory distress. She has no wheezes. She has no rales. She exhibits no tenderness.   Abdominal: Soft. Normal appearance and bowel sounds are normal. She exhibits no distension, no ascites and no mass. There is " tenderness (tenderness, generalized (worst in LUQ)). There is no rigidity and no guarding. No hernia.   Abnormal swelling palpated in LUQ, possible splenomegaly   Musculoskeletal: Normal range of motion. She exhibits no edema or deformity.   Neurological: She is alert and oriented to person, place, and time. She exhibits normal muscle tone. Coordination normal.   Skin: Skin is warm and dry. No rash noted. No erythema. No pallor.   Psychiatric: She has a normal mood and affect. Her behavior is normal. Judgment and thought content normal.   Nursing note and vitals reviewed.      Assessment/Plan     1. Iron deficiency anemia, unspecified iron deficiency anemia type      No bleeding was noted on EGD or colonoscopy. She does not want to proceed with a PillCam at this time.     She will need a repeat colonoscopy in 5 years due to personal history of colon polyps. We will place her on the recall list to be called to schedule an appointment closer to that date. She was instructed to call the office if no reminder call is made within the proper number of years. Also, the indications for a repeat colonoscopy sooner than the recall date were discussed; rectal bleeding, melena, change in bowel habits or significant abdominal pain.          Return if symptoms worsen or fail to improve.      Electronically signed 4/18/2018 1:29 PM  Delia Henderson PA-C, [facility]

## 2019-11-25 ENCOUNTER — OUTSIDE FACILITY SERVICE (OUTPATIENT)
Dept: CARDIOLOGY | Facility: CLINIC | Age: 74
End: 2019-11-25

## 2019-11-25 PROCEDURE — 93018 CV STRESS TEST I&R ONLY: CPT | Performed by: INTERNAL MEDICINE

## 2019-11-25 PROCEDURE — 78452 HT MUSCLE IMAGE SPECT MULT: CPT | Performed by: INTERNAL MEDICINE

## 2023-03-08 ENCOUNTER — OUTSIDE FACILITY SERVICE (OUTPATIENT)
Dept: CARDIOLOGY | Facility: CLINIC | Age: 78
End: 2023-03-08
Payer: MEDICARE

## 2023-03-08 PROCEDURE — 99223 1ST HOSP IP/OBS HIGH 75: CPT | Performed by: INTERNAL MEDICINE

## 2023-03-09 ENCOUNTER — OUTSIDE FACILITY SERVICE (OUTPATIENT)
Dept: CARDIOLOGY | Facility: CLINIC | Age: 78
End: 2023-03-09
Payer: MEDICARE

## 2023-03-09 PROCEDURE — 99232 SBSQ HOSP IP/OBS MODERATE 35: CPT | Performed by: INTERNAL MEDICINE

## 2023-03-13 ENCOUNTER — OUTSIDE FACILITY SERVICE (OUTPATIENT)
Dept: CARDIOLOGY | Facility: CLINIC | Age: 78
End: 2023-03-13
Payer: MEDICARE

## 2023-03-13 ENCOUNTER — TELEPHONE (OUTPATIENT)
Dept: CARDIOLOGY | Facility: CLINIC | Age: 78
End: 2023-03-13
Payer: MEDICARE

## 2023-03-13 PROCEDURE — 93018 CV STRESS TEST I&R ONLY: CPT | Performed by: INTERNAL MEDICINE

## 2023-03-30 ENCOUNTER — OFFICE VISIT (OUTPATIENT)
Dept: CARDIOLOGY | Facility: CLINIC | Age: 78
End: 2023-03-30
Payer: MEDICARE

## 2023-03-30 VITALS
DIASTOLIC BLOOD PRESSURE: 56 MMHG | WEIGHT: 165.34 LBS | SYSTOLIC BLOOD PRESSURE: 104 MMHG | HEART RATE: 78 BPM | BODY MASS INDEX: 35.67 KG/M2 | HEIGHT: 57 IN

## 2023-03-30 DIAGNOSIS — I10 PRIMARY HYPERTENSION: ICD-10-CM

## 2023-03-30 DIAGNOSIS — E78.00 HYPERCHOLESTEREMIA: ICD-10-CM

## 2023-03-30 DIAGNOSIS — R06.02 SHORTNESS OF BREATH: Primary | ICD-10-CM

## 2023-03-30 DIAGNOSIS — E03.9 HYPOTHYROIDISM, UNSPECIFIED TYPE: ICD-10-CM

## 2023-03-30 DIAGNOSIS — E88.81 METABOLIC SYNDROME: ICD-10-CM

## 2023-03-30 DIAGNOSIS — I27.20 PHT (PULMONARY HYPERTENSION): ICD-10-CM

## 2023-03-30 DIAGNOSIS — E11.9 TYPE 2 DIABETES MELLITUS WITHOUT COMPLICATION, WITHOUT LONG-TERM CURRENT USE OF INSULIN: ICD-10-CM

## 2023-03-30 PROBLEM — E88.810 METABOLIC SYNDROME: Status: ACTIVE | Noted: 2023-03-30

## 2023-03-30 RX ORDER — TORSEMIDE 20 MG/1
20 TABLET ORAL DAILY
COMMUNITY
End: 2023-03-30 | Stop reason: SDUPTHER

## 2023-03-30 RX ORDER — CAPTOPRIL 12.5 MG/1
12.5 TABLET ORAL DAILY
Qty: 90 TABLET | Refills: 3 | Status: SHIPPED | OUTPATIENT
Start: 2023-03-30

## 2023-03-30 RX ORDER — OMEPRAZOLE 40 MG/1
40 CAPSULE, DELAYED RELEASE ORAL DAILY
COMMUNITY

## 2023-03-30 RX ORDER — SIMVASTATIN 20 MG
20 TABLET ORAL NIGHTLY
Qty: 90 TABLET | Refills: 3 | Status: SHIPPED | OUTPATIENT
Start: 2023-03-30

## 2023-03-30 RX ORDER — MAGNESIUM OXIDE 400 MG/1
400 TABLET ORAL DAILY
COMMUNITY

## 2023-03-30 RX ORDER — METOPROLOL SUCCINATE 100 MG/1
100 TABLET, EXTENDED RELEASE ORAL DAILY
COMMUNITY
End: 2023-03-30 | Stop reason: SDUPTHER

## 2023-03-30 RX ORDER — METOPROLOL SUCCINATE 100 MG/1
100 TABLET, EXTENDED RELEASE ORAL DAILY
Qty: 90 TABLET | Refills: 3 | Status: SHIPPED | OUTPATIENT
Start: 2023-03-30

## 2023-03-30 RX ORDER — TORSEMIDE 20 MG/1
20 TABLET ORAL DAILY
Qty: 90 TABLET | Refills: 3 | Status: SHIPPED | OUTPATIENT
Start: 2023-03-30

## 2023-03-30 RX ORDER — DAPAGLIFLOZIN 10 MG/1
TABLET, FILM COATED ORAL DAILY
COMMUNITY

## 2023-03-30 NOTE — PROGRESS NOTES
Chief Complaint   Patient presents with   • Hospital Follow Up Visit     Reports doing well since discharge. SOB and edema has improved.    • Med Refill     Refills needed on cardiac meds. 90 days to [facility] in [facility].   • Labs     Recently saw PCP, no labs since discharge. [facility] home health is checking labs next week.        CARDIAC COMPLAINTS  dyspnea and fatigue        Subjective   Aggie Perkins is a 77 y.o. female came in today for her hospital follow-up visit.  She was in the hospital few weeks ago with increasing shortness of breath and leg edema.  She has hypertension, diabetes, hypothyroidism and hypercholesterolemia.  She was found to be hypoxic and got admitted.  She was treated with diuretics and got better.  Her echocardiogram was read by another cardiologist as having mild aortic valvular stenosis and mild pulmonary hypertension.  Her stress test was reported by the radiologist as within normal limit.  She was initially put on sildenafil but apparently it was not covered by her insurance.  She was put on a higher dose of beta-blockers.  She came today stating that she is feeling little better.  Her shortness of breath and edema has improved.  She had 1 episode of desaturation and uses oxygen.  She does not use it all the time.  She has not had any labs done since discharge.  She denies having any chest pain.              Cardiac History  Past Surgical History:   Procedure Laterality Date   • CARDIOVASCULAR STRESS TEST  03/20/2023    @ [facility]. . [place]- EF 70%. negative   • CHOLECYSTECTOMY     • COLONOSCOPY  2015   • COLONOSCOPY N/A 03/23/2018    Procedure: COLONOSCOPY CPT CODE: 26953;  Surgeon: Rinku Urias III, MD;  Location: [facility];  Service: Gastroenterology   • ECHO - CONVERTED  03/06/2023    @ [facility]. - EF 65%SARITA-1.7 [id number]- 52 mmHg   • ENDOSCOPY     • ENDOSCOPY N/A 03/23/2018    Procedure: ESOPHAGOGASTRODUODENOSCOPY WITH BIOPSY CPT CODE: 49855;  Surgeon: Rinku  Vin Urias III, MD;  Location: [facility];  Service: Gastroenterology   • HYSTERECTOMY         Current Outpatient Medications   Medication Sig Dispense Refill   • alendronate (FOSAMAX) 70 MG tablet 1 tablet Every 7 (Seven) Days.     • aspirin 325 MG tablet Take 1 tablet by mouth Daily.     • calcium carbonate 500 MG tablet tablet Take  by mouth Daily.     • captopril (CAPOTEN) 12.5 MG tablet Take 1 tablet by mouth Daily. 90 tablet 3   • Cinnamon 500 MG capsule Take 1 capsule by mouth Daily.     • dapagliflozin Propanediol (Farxiga) 10 MG tablet Take  by mouth Daily.     • ferrous sulfate 325 (65 FE) MG tablet Take 1 tablet by mouth Daily With Breakfast.     • levothyroxine (SYNTHROID, LEVOTHROID) 25 MCG tablet Take 1 tablet by mouth Daily.     • linagliptin (TRADJENTA) 5 MG tablet tablet Take 1 tablet by mouth Daily.     • Loratadine 10 MG capsule Take  by mouth Daily.     • magnesium oxide (MAG-OX) 400 MG tablet Take 1 tablet by mouth Daily.     • metFORMIN (GLUCOPHAGE) 500 MG tablet Take 1 tablet by mouth 2 (Two) Times a Day With Meals.     • metoprolol succinate XL (TOPROL-XL) 100 MG 24 hr tablet Take 1 tablet by mouth Daily. 90 tablet 3   • montelukast (SINGULAIR) 10 MG tablet Take 1 tablet by mouth Every Night.     • niacin 500 MG tablet Take 1 tablet by mouth Every Night.     • O2 (OXYGEN) Inhale 2 L/min Daily As Needed.     • omeprazole (priLOSEC) 40 MG capsule Take 1 capsule by mouth Daily.     • oxybutynin (DITROPAN) 5 MG tablet Take 1 tablet by mouth Daily.     • simvastatin (ZOCOR) 20 MG tablet Take 1 tablet by mouth Every Night. 90 tablet 3   • torsemide (DEMADEX) 20 MG tablet Take 1 tablet by mouth Daily. 90 tablet 3   • vitamin B-12 (CYANOCOBALAMIN) 500 MCG tablet Take 1 tablet by mouth Daily.       No current facility-administered medications for this visit.       Allergies  :  Patient has no known allergies.       Past Medical History:   Diagnosis Date   • Arthritis    • Diabetes mellitus (HCC)    •  "Disease of thyroid gland    • Hyperlipidemia    • Hypertension    • PHT (pulmonary hypertension) ([facility]) 3/30/2023       Social History     Socioeconomic History   • Marital status: Single   Tobacco Use   • Smoking status: Never   • Smokeless tobacco: Never   Vaping Use   • Vaping Use: Never used   Substance and Sexual Activity   • Alcohol use: No   • Drug use: No   • Sexual activity: Defer       Family History   Problem Relation Age of Onset   • Colon cancer Mother    • Colon cancer Father    • Colon cancer Brother        Review of Systems   Constitutional: Positive for malaise/fatigue. Negative for decreased appetite.   HENT: Negative for congestion and sore throat.    Eyes: Negative for blurred vision, double vision and visual disturbance.   Cardiovascular: Positive for dyspnea on exertion. Negative for chest pain.   Respiratory: Positive for shortness of breath. Negative for snoring.    Endocrine: Negative for cold intolerance and heat intolerance.   Hematologic/Lymphatic: Negative for adenopathy. Does not bruise/bleed easily.   Skin: Negative for itching, nail changes and skin cancer.   Musculoskeletal: Negative for arthritis and myalgias.   Gastrointestinal: Negative for abdominal pain, dysphagia and heartburn.   Genitourinary: Negative for bladder incontinence and frequency.   Neurological: Negative for dizziness, seizures and vertigo.   Psychiatric/Behavioral: Negative for altered mental status.   Allergic/Immunologic: Negative for environmental allergies and hives.       Diabetes- Yes  Thyroid- abnormal    Objective     /56   Pulse 78   Ht 144.8 cm (57\")   Wt 75 kg (165 lb 5.5 oz)   BMI 35.78 kg/m²     Vitals and nursing note reviewed.   Constitutional:       Appearance: Healthy appearance. Not in distress.   Eyes:      Conjunctiva/sclera: Conjunctivae normal.      Pupils: Pupils are equal, round, and reactive to light.   HENT:      Head: Normocephalic.   Pulmonary:      Effort: Pulmonary effort is " normal.      Breath sounds: Normal breath sounds.   Cardiovascular:      PMI at left midclavicular line. Normal rate. Regular rhythm. loud S2.      Murmurs: There is a grade 3/6 high frequency blowing holosystolic murmur at the apex.   Abdominal:      General: Bowel sounds are normal.      Palpations: Abdomen is soft.   Musculoskeletal: Normal range of motion.      Cervical back: Normal range of motion and neck supple. Skin:     General: Skin is warm and dry.   Neurological:      Mental Status: Alert, oriented to person, place, and time and oriented to person, place and time.         ECG 12 Lead    Date/Time: 3/30/2023 3:30 PM  Performed by: Maxx Loving MD  Authorized by: Maxx Loving MD   Previous ECG: no previous ECG available  Rhythm: sinus rhythm  Rate: normal  Q waves: V1, V2, V3 and V4    QRS axis: normal  Other findings: low voltage    Clinical impression: abnormal EKG                    @ASSESSMENT/PLAN@  Class 2 Severe Obesity (BMI >=35 and <=39.9). Obesity-related health conditions include the following: obstructive sleep apnea, hypertension, diabetes mellitus and lower extremity venous stasis disease. Obesity is unchanged. BMI is is above average; BMI management plan is completed. We discussed low calorie, low carb based diet program, portion control and increasing exercise.     Diagnoses and all orders for this visit:    1. Shortness of breath (Primary)  -     torsemide (DEMADEX) 20 MG tablet; Take 1 tablet by mouth Daily.  Dispense: 90 tablet; Refill: 3  -     CBC & Differential; Future  -     BNP; Future    2. Primary hypertension  -     metoprolol succinate XL (TOPROL-XL) 100 MG 24 hr tablet; Take 1 tablet by mouth Daily.  Dispense: 90 tablet; Refill: 3  -     captopril (CAPOTEN) 12.5 MG tablet; Take 1 tablet by mouth Daily.  Dispense: 90 tablet; Refill: 3  -     torsemide (DEMADEX) 20 MG tablet; Take 1 tablet by mouth Daily.  Dispense: 90 tablet; Refill: 3  -     Comprehensive  Metabolic Panel; Future    3. Hypercholesteremia  -     simvastatin (ZOCOR) 20 MG tablet; Take 1 tablet by mouth Every Night.  Dispense: 90 tablet; Refill: 3  -     Lipid Panel; Future    4. PHT (pulmonary hypertension) (HCC)  -     torsemide (DEMADEX) 20 MG tablet; Take 1 tablet by mouth Daily.  Dispense: 90 tablet; Refill: 3  -     BNP; Future    5. Hypothyroidism, unspecified type    6. Type 2 diabetes mellitus without complication, without long-term current use of insulin (HCC)  -     Hemoglobin A1c; Future    7. Metabolic syndrome    At baseline her heart rate is stable.  Her blood pressure is lower limit of normal.  Her EKG shows normal sinus rhythm, small QRS complex, left anterior fascicular block, Q waves in the anterior leads.  Her clinical examination reveals a BMI of 36.  She does have a slightly loud second heart sound and a short systolic murmur at the mitral area.    Regarding the shortness of breath, it appears to be mostly from her diastolic dysfunction and mild pulm hypertension.  At this time we will continue the Demadex as well as the captopril.  I do like to recheck her echocardiogram in 6 to 8 months.  If the PA pressure is still elevated then we may have to put her on a calcium channel blockers in the form of Procardia XL.    Regarding her hypertension, it seems to be controlled with captopril, Toprol-XL and Demadex.  Continue the same.  She is advised to have her electrolytes and renal function checked again along with BNP level    Regarding her history of hypercholesterolemia, she is on Zocor.  Continue the same and have the lipid panel checked    Regarding her pulm hypertension, continue Demadex and captopril.  Recheck it in 6 months    Regarding her diabetes, she is on Farxiga, metformin and Tradjenta.  Continue the same and have the A1c level checked    Regarding her hypothyroidism, she is taking thyroid supplements.  Continue the same and have the TSH level checked    Regarding her  metabolic syndrome, talked to her about diet and weight reduction.  I gave her papers on Mediterranean diet.    Regarding advanced directive, I talked to her about living will and power of .  I did give her booklets regarding that    We will see her back in 6 months or sooner if needed                    Electronically signed by Maxx Loving MD March 30, 2023 15:24 EDT

## 2023-10-25 ENCOUNTER — OFFICE VISIT (OUTPATIENT)
Dept: CARDIOLOGY | Facility: CLINIC | Age: 78
End: 2023-10-25
Payer: MEDICARE

## 2023-10-25 VITALS
DIASTOLIC BLOOD PRESSURE: 60 MMHG | WEIGHT: 148.6 LBS | BODY MASS INDEX: 32.06 KG/M2 | SYSTOLIC BLOOD PRESSURE: 114 MMHG | HEIGHT: 57 IN | HEART RATE: 60 BPM

## 2023-10-25 DIAGNOSIS — N18.32 STAGE 3B CHRONIC KIDNEY DISEASE: ICD-10-CM

## 2023-10-25 DIAGNOSIS — E78.00 HYPERCHOLESTEREMIA: ICD-10-CM

## 2023-10-25 DIAGNOSIS — R06.02 SHORTNESS OF BREATH: ICD-10-CM

## 2023-10-25 DIAGNOSIS — E11.9 TYPE 2 DIABETES MELLITUS WITHOUT COMPLICATION, WITHOUT LONG-TERM CURRENT USE OF INSULIN: ICD-10-CM

## 2023-10-25 DIAGNOSIS — I10 PRIMARY HYPERTENSION: ICD-10-CM

## 2023-10-25 DIAGNOSIS — E03.9 HYPOTHYROIDISM, UNSPECIFIED TYPE: ICD-10-CM

## 2023-10-25 DIAGNOSIS — I27.20 PHT (PULMONARY HYPERTENSION): Primary | ICD-10-CM

## 2023-10-25 RX ORDER — METOPROLOL SUCCINATE 100 MG/1
100 TABLET, EXTENDED RELEASE ORAL DAILY
Qty: 90 TABLET | Refills: 3 | Status: SHIPPED | OUTPATIENT
Start: 2023-10-25

## 2023-10-25 RX ORDER — TORSEMIDE 20 MG/1
20 TABLET ORAL DAILY
Qty: 90 TABLET | Refills: 3 | Status: SHIPPED | OUTPATIENT
Start: 2023-10-25

## 2023-10-25 RX ORDER — CAPTOPRIL 12.5 MG/1
12.5 TABLET ORAL DAILY
Qty: 90 TABLET | Refills: 3 | Status: SHIPPED | OUTPATIENT
Start: 2023-10-25

## 2023-10-25 RX ORDER — SIMVASTATIN 20 MG
20 TABLET ORAL NIGHTLY
Qty: 90 TABLET | Refills: 3 | Status: SHIPPED | OUTPATIENT
Start: 2023-10-25

## 2023-10-25 NOTE — PROGRESS NOTES
Chief Complaint   Patient presents with    Follow-up     Cardiac management. Patient reports her BP has been doing good.    LABS     July 2023 results on chart  Had abnormal kidney function and has been referred to Nephrology    Med Refill     Requests refills [facility]       Aggie Perkins is a 78 y.o. female with HTN, DM, hypothyroidism, and hypercholesterolemia.  In early 2023 she was hospitalized, found to be hypoxic and treated with diuretics.  Echocardiogram read by another cardiologist showed mild AS and mild PTH.  Stress test per radiologist reported as normal.  Sildenafil initially prescribed but insurance did not cover.  Beta-blocker dose increased.    In June she was seen in the hospital secondary to confusion and found to be hypoxic.  Symptoms improved with supplemental oxygen.  Chest x-ray was without infiltrate.  Labs showed GFR 32, referred to nephrology.  CTA of head and neck showed 40% stenosis distal right common carotid artery    Today she returns to the office for follow-up visit.  Since June she has not had recurrence of symptoms.  At home she uses oxygen randomly during the day and most often at night.  Random SPO2 checks are done with lowest being 88.  In the morning she checks blood sugar and blood pressure.  Glucose this morning was 110.  Blood pressures remained stable.  No chest pain, palpitations, or edema noted.      Cardiac History:    Past Surgical History:   Procedure Laterality Date    CARDIOVASCULAR STRESS TEST  03/20/2023    @ [facility]. . [place]- EF 70%. negative    CHOLECYSTECTOMY      COLONOSCOPY  2015    COLONOSCOPY N/A 03/23/2018    Procedure: COLONOSCOPY CPT CODE: 09837;  Surgeon: Rinku Urias III, MD;  Location: [facility];  Service: Gastroenterology    ECHO - CONVERTED  03/06/2023    @ [facility]. - EF 65%SARITA-1.7 [id number]- 52 mmHg    ENDOSCOPY      ENDOSCOPY N/A 03/23/2018    Procedure: ESOPHAGOGASTRODUODENOSCOPY WITH BIOPSY CPT CODE:  45072;  Surgeon: Rinku Urias III, MD;  Location: [facility];  Service: Gastroenterology    HYSTERECTOMY         Current Outpatient Medications   Medication Sig Dispense Refill    alendronate (FOSAMAX) 70 MG tablet 1 tablet Every 7 (Seven) Days.      aspirin 325 MG tablet Take 1 tablet by mouth Daily.      calcium carbonate 500 MG tablet tablet Take  by mouth Daily.      captopril (CAPOTEN) 12.5 MG tablet Take 1 tablet by mouth Daily. 90 tablet 3    Cinnamon 500 MG capsule Take 1 capsule by mouth Daily.      dapagliflozin Propanediol (Farxiga) 10 MG tablet Take  by mouth Daily.      ferrous sulfate 325 (65 FE) MG tablet Take 1 tablet by mouth Daily With Breakfast.      levothyroxine (SYNTHROID, LEVOTHROID) 25 MCG tablet Take 1 tablet by mouth Daily.      linagliptin (TRADJENTA) 5 MG tablet tablet Take 1 tablet by mouth Daily.      Loratadine 10 MG capsule Take  by mouth Daily.      magnesium oxide (MAG-OX) 400 MG tablet Take 1 tablet by mouth Daily.      metFORMIN (GLUCOPHAGE) 500 MG tablet Take 1 tablet by mouth 2 (Two) Times a Day With Meals.      metoprolol succinate XL (TOPROL-XL) 100 MG 24 hr tablet Take 1 tablet by mouth Daily. 90 tablet 3    montelukast (SINGULAIR) 10 MG tablet Take 1 tablet by mouth Every Night.      niacin 500 MG tablet Take 1 tablet by mouth Every Night.      O2 (OXYGEN) Inhale 2 L/min Daily As Needed.      omeprazole (priLOSEC) 40 MG capsule Take 1 capsule by mouth Daily.      oxybutynin (DITROPAN) 5 MG tablet Take 1 tablet by mouth Daily.      simvastatin (ZOCOR) 20 MG tablet Take 1 tablet by mouth Every Night. 90 tablet 3    torsemide (DEMADEX) 20 MG tablet Take 1 tablet by mouth Daily. 90 tablet 3    vitamin B-12 (CYANOCOBALAMIN) 500 MCG tablet Take 1 tablet by mouth Daily.       No current facility-administered medications for this visit.       Patient has no known allergies.    Past Medical History:   Diagnosis Date    Arthritis     Diabetes mellitus     Disease of thyroid  gland     Hyperlipidemia     Hypertension     PHT (pulmonary hypertension) 3/30/2023       Social History     Socioeconomic History    Marital status: Single   Tobacco Use    Smoking status: Never    Smokeless tobacco: Never   Vaping Use    Vaping Use: Never used   Substance and Sexual Activity    Alcohol use: No    Drug use: No    Sexual activity: Defer       Family History   Problem Relation Age of Onset    Colon cancer Mother     Colon cancer Father     Colon cancer Brother        Review of Systems   Constitutional: Positive for malaise/fatigue (no worse).   HENT:  Negative for congestion and nosebleeds.    Cardiovascular:  Negative for chest pain, leg swelling, near-syncope and palpitations.   Respiratory:  Positive for shortness of breath and sleep disturbances due to breathing (uses supplemental at night).    Hematologic/Lymphatic: Negative for bleeding problem.   Skin:  Negative for color change.   Gastrointestinal:  Negative for change in bowel habit, melena and nausea.   Genitourinary:  Positive for nocturia. Negative for dysuria and hematuria.   Psychiatric/Behavioral:  Negative for memory loss. The patient does not have insomnia.         BP Readings from Last 5 Encounters:   10/25/23 114/60   03/30/23 104/56   04/06/18 149/70   03/23/18 135/78   02/16/18 174/93       Wt Readings from Last 5 Encounters:   10/25/23 67.4 kg (148 lb 9.6 oz)   03/30/23 75 kg (165 lb 5.5 oz)   04/06/18 79.7 kg (175 lb 9.6 oz)   03/23/18 77.1 kg (170 lb)   02/16/18 78.9 kg (174 lb)       Objective     Labs 7/7/2023 per PCP: WBC 10.10, RBC 3.95, hemoglobin 10.7, hematocrit 35.7, platelets 458, sodium 139, potassium 4.6, chloride 102,, oxide 32, glucose 97, BUN 39, creatinine 1.81, GFR 29, total protein 10.8, BUN 3.6, calcium 10.3, total bili 0.2, AST 20, ALT 13, , magnesium 1.9, triglycerides 90, total cholesterol 109, HDL 40, LDL 60, vitamin D 1891, free T41.33, TSH 3.055, A1c 5.9, vitamin D 40.6    /60 (BP  "Location: Left arm, Patient Position: Sitting)   Pulse 60   Ht 144.8 cm (57\")   Wt 67.4 kg (148 lb 9.6 oz)   BMI 32.16 kg/m²     Vitals and nursing note reviewed.   Constitutional:       Appearance: Not in distress.   Eyes:      Conjunctiva/sclera: Conjunctivae normal.   Neck:      Vascular: No carotid bruit.   Pulmonary:      Effort: Pulmonary effort is normal.      Breath sounds: Normal breath sounds. No rales.   Cardiovascular:      PMI at left midclavicular line. Regular rhythm.      Murmurs: There is no murmur.   Edema:     Peripheral edema absent.   Abdominal:      General: Bowel sounds are normal. There is distension (softly).      Palpations: Abdomen is soft.      Tenderness: There is no abdominal tenderness.   Musculoskeletal:      Cervical back: Neck supple. Skin:     General: Skin is warm and dry.      Coloration: Skin is not jaundiced or pale.   Neurological:      Mental Status: Alert and oriented to person, place and time.          Procedures: none today          Assessment & Plan   Diagnoses and all orders for this visit:    1. PHT (pulmonary hypertension) (Primary)  -     torsemide (DEMADEX) 20 MG tablet; Take 1 tablet by mouth Daily.  Dispense: 90 tablet; Refill: 3  -     Adult Transthoracic Echo Complete W/ Cont if Necessary Per Protocol; Future    2. Shortness of breath  -     torsemide (DEMADEX) 20 MG tablet; Take 1 tablet by mouth Daily.  Dispense: 90 tablet; Refill: 3  -     Adult Transthoracic Echo Complete W/ Cont if Necessary Per Protocol; Future    3. Primary hypertension  -     metoprolol succinate XL (TOPROL-XL) 100 MG 24 hr tablet; Take 1 tablet by mouth Daily.  Dispense: 90 tablet; Refill: 3  -     captopril (CAPOTEN) 12.5 MG tablet; Take 1 tablet by mouth Daily.  Dispense: 90 tablet; Refill: 3  -     torsemide (DEMADEX) 20 MG tablet; Take 1 tablet by mouth Daily.  Dispense: 90 tablet; Refill: 3    4. Hypercholesteremia  -     simvastatin (ZOCOR) 20 MG tablet; Take 1 tablet by mouth " Every Night.  Dispense: 90 tablet; Refill: 3    5. Type 2 diabetes mellitus without complication, without long-term current use of insulin    6. Hypothyroidism, unspecified type    7. Stage 3b chronic kidney disease      Pulmonary hypertension/shortness of breath  -Continue supplemental oxygen  -Continue diuretic, Demadex 20 mg daily  -Repeat echocardiogram.  If warranted consider addition of Procardia for management of PHT.    Primary hypertension  -BP stable  -Continue Captopril 12.5 daily, Toprol- mg daily, Demadex 20 mg daily    Diabetes  -Followed through PCP office  -Admits morning glucose levels controlled, most recent 110  -Currently on: Farxiga, metformin, Tradjenta    Hypothyroidism  -Takes Synthroid appropriately    Chronic kidney disease  -Scheduled to see nephrologist in near future  -At this time continue ACE inhibitor  -Consider Kerendia?     Further recommendations based on echocardiogram.    6-month follow-up visit scheduled.

## 2023-11-07 ENCOUNTER — HOSPITAL ENCOUNTER (OUTPATIENT)
Dept: CARDIOLOGY | Facility: HOSPITAL | Age: 78
Discharge: HOME | End: 2023-11-07
Admitting: NURSE PRACTITIONER
Payer: MEDICARE

## 2023-11-07 DIAGNOSIS — R06.02 SHORTNESS OF BREATH: ICD-10-CM

## 2023-11-07 DIAGNOSIS — I27.20 PHT (PULMONARY HYPERTENSION): ICD-10-CM

## 2023-11-07 LAB
BH CV ECHO MEAS - ACS: 1.77 CM
BH CV ECHO MEAS - AO MAX PG: 8.3 MMHG
BH CV ECHO MEAS - AO MEAN PG: 5 MMHG
BH CV ECHO MEAS - AO ROOT DIAM: 3 CM
BH CV ECHO MEAS - AO V2 MAX: 144 CM/SEC
BH CV ECHO MEAS - AO V2 VTI: 28.3 CM
BH CV ECHO MEAS - EDV(CUBED): 24.6 ML
BH CV ECHO MEAS - EDV(MOD-SP4): 56 ML
BH CV ECHO MEAS - EF(MOD-SP4): 59.5 %
BH CV ECHO MEAS - EF_3D-VOL: 59 %
BH CV ECHO MEAS - ESV(CUBED): 5.4 ML
BH CV ECHO MEAS - ESV(MOD-SP4): 22.7 ML
BH CV ECHO MEAS - FS: 39.9 %
BH CV ECHO MEAS - IVS/LVPW: 1.03 CM
BH CV ECHO MEAS - IVSD: 1.59 CM
BH CV ECHO MEAS - LA DIMENSION: 3.8 CM
BH CV ECHO MEAS - LAT PEAK E' VEL: 3.8 CM/SEC
BH CV ECHO MEAS - LV DIASTOLIC VOL/BSA (35-75): 35.4 CM2
BH CV ECHO MEAS - LV MASS(C)D: 164.3 GRAMS
BH CV ECHO MEAS - LV SYSTOLIC VOL/BSA (12-30): 14.3 CM2
BH CV ECHO MEAS - LVIDD: 2.9 CM
BH CV ECHO MEAS - LVIDS: 1.75 CM
BH CV ECHO MEAS - LVPWD: 1.55 CM
BH CV ECHO MEAS - MED PEAK E' VEL: 3 CM/SEC
BH CV ECHO MEAS - MV A MAX VEL: 170 CM/SEC
BH CV ECHO MEAS - MV DEC SLOPE: 305.5 CM/SEC2
BH CV ECHO MEAS - MV DEC TIME: 0.33 SEC
BH CV ECHO MEAS - MV E MAX VEL: 106 CM/SEC
BH CV ECHO MEAS - MV E/A: 0.62
BH CV ECHO MEAS - MV MAX PG: 11.9 MMHG
BH CV ECHO MEAS - MV MEAN PG: 5.1 MMHG
BH CV ECHO MEAS - MV P1/2T: 114.5 MSEC
BH CV ECHO MEAS - MV V2 VTI: 42.5 CM
BH CV ECHO MEAS - MVA(P1/2T): 1.92 CM2
BH CV ECHO MEAS - RVDD: 2.2 CM
BH CV ECHO MEAS - SI(MOD-SP4): 21 ML/M2
BH CV ECHO MEAS - SV(MOD-SP4): 33.3 ML
BH CV ECHO MEASUREMENTS AVERAGE E/E' RATIO: 31.18
BH CV XLRA - RV BASE: 3.2 CM
BH CV XLRA - RV LENGTH: 6.5 CM
BH CV XLRA - RV MID: 2.4 CM
LEFT ATRIUM VOLUME INDEX: 31.3 ML/M2

## 2023-11-07 PROCEDURE — 93306 TTE W/DOPPLER COMPLETE: CPT

## 2023-11-07 PROCEDURE — 93306 TTE W/DOPPLER COMPLETE: CPT | Performed by: INTERNAL MEDICINE

## 2024-02-07 ENCOUNTER — TRANSCRIBE ORDERS (OUTPATIENT)
Dept: ADMINISTRATIVE | Facility: HOSPITAL | Age: 79
End: 2024-02-07
Payer: MEDICARE

## 2024-02-07 DIAGNOSIS — N18.32 STAGE 3B CHRONIC KIDNEY DISEASE: Primary | ICD-10-CM

## 2024-03-14 ENCOUNTER — HOSPITAL ENCOUNTER (OUTPATIENT)
Dept: ULTRASOUND IMAGING | Facility: HOSPITAL | Age: 79
Discharge: HOME | End: 2024-03-14
Admitting: INTERNAL MEDICINE
Payer: MEDICARE

## 2024-03-14 DIAGNOSIS — N18.32 STAGE 3B CHRONIC KIDNEY DISEASE: ICD-10-CM

## 2024-03-14 PROCEDURE — 76775 US EXAM ABDO BACK WALL LIM: CPT | Performed by: RADIOLOGY

## 2024-03-14 PROCEDURE — 76775 US EXAM ABDO BACK WALL LIM: CPT

## 2024-03-21 ENCOUNTER — LAB (OUTPATIENT)
Dept: LAB | Facility: HOSPITAL | Age: 79
End: 2024-03-21
Payer: MEDICARE

## 2024-03-21 ENCOUNTER — TRANSCRIBE ORDERS (OUTPATIENT)
Dept: ADMINISTRATIVE | Facility: HOSPITAL | Age: 79
End: 2024-03-21
Payer: MEDICARE

## 2024-03-21 DIAGNOSIS — N18.32 CHRONIC KIDNEY DISEASE (CKD) STAGE G3B/A1, MODERATELY DECREASED GLOMERULAR FILTRATION RATE (GFR) BETWEEN 30-44 ML/MIN/1.73 SQUARE METER AND ALBUMINURIA CREATININE RATIO LESS THAN 30 MG/G (CMS/H*: ICD-10-CM

## 2024-03-21 DIAGNOSIS — N18.32 CHRONIC KIDNEY DISEASE (CKD) STAGE G3B/A1, MODERATELY DECREASED GLOMERULAR FILTRATION RATE (GFR) BETWEEN 30-44 ML/MIN/1.73 SQUARE METER AND ALBUMINURIA CREATININE RATIO LESS THAN 30 MG/G (CMS/H*: Primary | ICD-10-CM

## 2024-03-21 LAB
ALBUMIN SERPL-MCNC: 4 G/DL (ref 3.5–5.2)
ALBUMIN/GLOB SERPL: 1.2 G/DL
ALP SERPL-CCNC: 76 U/L (ref 39–117)
ALT SERPL W P-5'-P-CCNC: 9 U/L (ref 1–33)
ANION GAP SERPL CALCULATED.3IONS-SCNC: 12.6 MMOL/L (ref 5–15)
AST SERPL-CCNC: 13 U/L (ref 1–32)
BILIRUB SERPL-MCNC: 0.4 MG/DL (ref 0–1.2)
BUN SERPL-MCNC: 38 MG/DL (ref 8–23)
BUN/CREAT SERPL: 15.4 (ref 7–25)
CALCIUM SPEC-SCNC: 9.6 MG/DL (ref 8.6–10.5)
CHLORIDE SERPL-SCNC: 98 MMOL/L (ref 98–107)
CO2 SERPL-SCNC: 28.4 MMOL/L (ref 22–29)
CREAT SERPL-MCNC: 2.47 MG/DL (ref 0.57–1)
EGFRCR SERPLBLD CKD-EPI 2021: 19.8 ML/MIN/1.73
GLOBULIN UR ELPH-MCNC: 3.3 GM/DL
GLUCOSE SERPL-MCNC: 107 MG/DL (ref 65–99)
POTASSIUM SERPL-SCNC: 4.8 MMOL/L (ref 3.5–5.2)
PROT SERPL-MCNC: 7.3 G/DL (ref 6–8.5)
SODIUM SERPL-SCNC: 139 MMOL/L (ref 136–145)

## 2024-03-21 PROCEDURE — 84156 ASSAY OF PROTEIN URINE: CPT

## 2024-03-21 PROCEDURE — 81001 URINALYSIS AUTO W/SCOPE: CPT

## 2024-03-21 PROCEDURE — 82570 ASSAY OF URINE CREATININE: CPT

## 2024-03-21 PROCEDURE — 82043 UR ALBUMIN QUANTITATIVE: CPT

## 2024-03-21 PROCEDURE — 80053 COMPREHEN METABOLIC PANEL: CPT

## 2024-03-21 PROCEDURE — 36415 COLL VENOUS BLD VENIPUNCTURE: CPT

## 2024-03-21 PROCEDURE — 87086 URINE CULTURE/COLONY COUNT: CPT

## 2024-03-22 LAB
ALBUMIN UR-MCNC: 11.4 MG/DL
BACTERIA UR QL AUTO: ABNORMAL /HPF
BILIRUB UR QL STRIP: NEGATIVE
BILIRUB UR QL STRIP: NEGATIVE
CLARITY UR: CLEAR
CLARITY UR: CLEAR
COLOR UR: YELLOW
COLOR UR: YELLOW
CREAT UR-MCNC: 74 MG/DL
CREAT UR-MCNC: 74 MG/DL
GLUCOSE UR STRIP-MCNC: NEGATIVE MG/DL
GLUCOSE UR STRIP-MCNC: NEGATIVE MG/DL
HGB UR QL STRIP.AUTO: ABNORMAL
HGB UR QL STRIP.AUTO: ABNORMAL
HOLD SPECIMEN: NORMAL
HYALINE CASTS UR QL AUTO: ABNORMAL /LPF
KETONES UR QL STRIP: NEGATIVE
KETONES UR QL STRIP: NEGATIVE
LEUKOCYTE ESTERASE UR QL STRIP.AUTO: ABNORMAL
LEUKOCYTE ESTERASE UR QL STRIP.AUTO: ABNORMAL
MICROALBUMIN/CREAT UR: 154.1 MG/G (ref 0–29)
NITRITE UR QL STRIP: NEGATIVE
NITRITE UR QL STRIP: NEGATIVE
PH UR STRIP.AUTO: 6.5 [PH] (ref 5–8)
PH UR STRIP.AUTO: 6.5 [PH] (ref 5–8)
PROT ?TM UR-MCNC: 28.5 MG/DL
PROT UR QL STRIP: ABNORMAL
PROT UR QL STRIP: ABNORMAL
PROT/CREAT UR: 385.1 MG/G CREA (ref 0–200)
RBC # UR STRIP: ABNORMAL /HPF
REF LAB TEST METHOD: ABNORMAL
SP GR UR STRIP: 1.01 (ref 1–1.03)
SP GR UR STRIP: 1.01 (ref 1–1.03)
SQUAMOUS #/AREA URNS HPF: ABNORMAL /HPF
UROBILINOGEN UR QL STRIP: ABNORMAL
UROBILINOGEN UR QL STRIP: ABNORMAL
WBC # UR STRIP: ABNORMAL /HPF

## 2024-03-23 LAB — BACTERIA SPEC AEROBE CULT: NO GROWTH

## 2024-05-02 ENCOUNTER — OFFICE VISIT (OUTPATIENT)
Dept: CARDIOLOGY | Facility: CLINIC | Age: 79
End: 2024-05-02
Payer: MEDICARE

## 2024-05-02 VITALS
HEIGHT: 57 IN | SYSTOLIC BLOOD PRESSURE: 136 MMHG | HEART RATE: 71 BPM | WEIGHT: 146.4 LBS | DIASTOLIC BLOOD PRESSURE: 60 MMHG | BODY MASS INDEX: 31.59 KG/M2

## 2024-05-02 DIAGNOSIS — R06.02 SHORTNESS OF BREATH: ICD-10-CM

## 2024-05-02 DIAGNOSIS — E78.00 HYPERCHOLESTEREMIA: ICD-10-CM

## 2024-05-02 DIAGNOSIS — E03.9 HYPOTHYROIDISM, UNSPECIFIED TYPE: ICD-10-CM

## 2024-05-02 DIAGNOSIS — I27.20 PHT (PULMONARY HYPERTENSION): Primary | ICD-10-CM

## 2024-05-02 DIAGNOSIS — E11.9 TYPE 2 DIABETES MELLITUS WITHOUT COMPLICATION, WITHOUT LONG-TERM CURRENT USE OF INSULIN: ICD-10-CM

## 2024-05-02 DIAGNOSIS — I10 PRIMARY HYPERTENSION: ICD-10-CM

## 2024-05-02 DIAGNOSIS — N18.4 CKD (CHRONIC KIDNEY DISEASE) STAGE 4, GFR 15-29 ML/MIN: ICD-10-CM
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
## 2024-05-03 ENCOUNTER — TELEPHONE (OUTPATIENT)
Dept: CARDIOLOGY | Facility: CLINIC | Age: 79
End: 2024-05-03

## 2024-05-03 NOTE — TELEPHONE ENCOUNTER
Caller: NORMAN CALZADA    Relationship: Niece/Nephew    Best call back number: [phone]    What is the best time to reach you: ANYTIME    Who are you requesting to speak with (clinical staff, provider,  specific staff member): CLINICAL    Do you know the name of the person who called: N/A    What was the call regarding: PATIENTS NIECE WAS CALLING BACK WITH A LIST OF MEDICATION FOR PATIENT, PER REQUEST OF JELENA JAMIL.    ALENDRONATE 70 MG- EVERY 7 DAYS  ASPIRIN 325 MG-DAILY  CALCIUM CARBONATE 500 MG TABLET -DAILY  CAPTOPRIL 12.5 MG-DAILY  FERROUS SULFATE 325 MG DAILY  LEVOTHYROXINE 25 MCG TABLET-DAILY  LORATADINE 10 MG DAILY  MAGNESIUM OXIDE 400 MG TABLET- DAILY  METFORMIN 500 MG 4 TIMES DAILY  METOPROLOL  MG 24 HR TABLET- DAILY  SINGULAR 10 MG DAILY  O2-OXYGEN AS NEEDED  OMEPRAZOLE  40 MG DAILY  OXYBUTYNIN 5 MG DAILY  SIMVASTATIN 20 MG NIGHTLY    AMLODIPINE 10 MG --KIDNEY DR STOPPED    ADDED MEDICATIONS  TRADJENTA- 5MG   TORSEMIDE 20 MG DAILY    NOT TAKING  NOT TAKING CINNAMON  NOT TAKING FARXIGA 10 MG  NOT TAKING NIACIN 500 MG    Is it okay if the provider responds through MyChart: CALL

## 2024-07-03 ENCOUNTER — HOSPITAL ENCOUNTER (INPATIENT)
Facility: HOSPITAL | Age: 79
LOS: 7 days | Discharge: HOME HEALTH | End: 2024-07-10
Attending: EMERGENCY MEDICINE | Admitting: HOSPITALIST
Payer: MEDICARE

## 2024-07-03 ENCOUNTER — APPOINTMENT (OUTPATIENT)
Dept: GENERAL RADIOLOGY | Facility: HOSPITAL | Age: 79
End: 2024-07-03
Payer: MEDICARE

## 2024-07-03 ENCOUNTER — APPOINTMENT (OUTPATIENT)
Dept: CT IMAGING | Facility: HOSPITAL | Age: 79
End: 2024-07-03
Payer: MEDICARE

## 2024-07-03 DIAGNOSIS — N17.9 ACUTE RENAL FAILURE SUPERIMPOSED ON CHRONIC KIDNEY DISEASE, UNSPECIFIED ACUTE RENAL FAILURE TYPE, UNSPECIFIED CKD STAGE: Primary | ICD-10-CM

## 2024-07-03 DIAGNOSIS — R31.29 MICROSCOPIC HEMATURIA: ICD-10-CM

## 2024-07-03 DIAGNOSIS — N18.9 ACUTE RENAL FAILURE SUPERIMPOSED ON CHRONIC KIDNEY DISEASE, UNSPECIFIED ACUTE RENAL FAILURE TYPE, UNSPECIFIED CKD STAGE: Primary | ICD-10-CM

## 2024-07-03 DIAGNOSIS — R54 AGE-RELATED PHYSICAL DEBILITY: ICD-10-CM

## 2024-07-03 DIAGNOSIS — E88.810 METABOLIC SYNDROME: ICD-10-CM

## 2024-07-03 LAB
ALBUMIN SERPL-MCNC: 3.6 G/DL (ref 3.5–5.2)
ALBUMIN SERPL-MCNC: 3.9 G/DL (ref 3.5–5.2)
ALBUMIN/GLOB SERPL: 1 G/DL
ALBUMIN/GLOB SERPL: 1 G/DL
ALP SERPL-CCNC: 70 U/L (ref 39–117)
ALP SERPL-CCNC: 72 U/L (ref 39–117)
ALT SERPL W P-5'-P-CCNC: 5 U/L (ref 1–33)
ALT SERPL W P-5'-P-CCNC: 5 U/L (ref 1–33)
AMPHET+METHAMPHET UR QL: NEGATIVE
AMPHETAMINES UR QL: NEGATIVE
ANION GAP SERPL CALCULATED.3IONS-SCNC: 14 MMOL/L (ref 5–15)
ANION GAP SERPL CALCULATED.3IONS-SCNC: 14.4 MMOL/L (ref 5–15)
APTT PPP: 31.2 SECONDS (ref 26.5–34.5)
AST SERPL-CCNC: 11 U/L (ref 1–32)
AST SERPL-CCNC: 12 U/L (ref 1–32)
BACTERIA UR QL AUTO: ABNORMAL /HPF
BARBITURATES UR QL SCN: NEGATIVE
BASOPHILS # BLD AUTO: 0.03 10*3/MM3 (ref 0–0.2)
BASOPHILS NFR BLD AUTO: 0.4 % (ref 0–1.5)
BENZODIAZ UR QL SCN: NEGATIVE
BILIRUB SERPL-MCNC: 0.3 MG/DL (ref 0–1.2)
BILIRUB SERPL-MCNC: 0.3 MG/DL (ref 0–1.2)
BILIRUB UR QL STRIP: NEGATIVE
BUN SERPL-MCNC: 72 MG/DL (ref 8–23)
BUN SERPL-MCNC: 78 MG/DL (ref 8–23)
BUN/CREAT SERPL: 14.8 (ref 7–25)
BUN/CREAT SERPL: 15.1 (ref 7–25)
BUPRENORPHINE SERPL-MCNC: NEGATIVE NG/ML
CALCIUM SPEC-SCNC: 8 MG/DL (ref 8.6–10.5)
CALCIUM SPEC-SCNC: 8.7 MG/DL (ref 8.6–10.5)
CANNABINOIDS SERPL QL: NEGATIVE
CHLORIDE SERPL-SCNC: 101 MMOL/L (ref 98–107)
CHLORIDE SERPL-SCNC: 99 MMOL/L (ref 98–107)
CK SERPL-CCNC: 79 U/L (ref 20–180)
CLARITY UR: CLEAR
CO2 SERPL-SCNC: 22 MMOL/L (ref 22–29)
CO2 SERPL-SCNC: 24.6 MMOL/L (ref 22–29)
COCAINE UR QL: NEGATIVE
COLOR UR: YELLOW
CREAT SERPL-MCNC: 4.78 MG/DL (ref 0.57–1)
CREAT SERPL-MCNC: 5.28 MG/DL (ref 0.57–1)
CRP SERPL-MCNC: 0.55 MG/DL (ref 0–0.5)
DEPRECATED RDW RBC AUTO: 45.2 FL (ref 37–54)
EGFRCR SERPLBLD CKD-EPI 2021: 7.9 ML/MIN/1.73
EGFRCR SERPLBLD CKD-EPI 2021: 8.9 ML/MIN/1.73
EOSINOPHIL # BLD AUTO: 0.16 10*3/MM3 (ref 0–0.4)
EOSINOPHIL NFR BLD AUTO: 2.3 % (ref 0.3–6.2)
ERYTHROCYTE [DISTWIDTH] IN BLOOD BY AUTOMATED COUNT: 12.7 % (ref 12.3–15.4)
ERYTHROCYTE [SEDIMENTATION RATE] IN BLOOD: 37 MM/HR (ref 0–30)
FENTANYL UR-MCNC: NEGATIVE NG/ML
GEN 5 2HR TROPONIN T REFLEX: 23 NG/L
GLOBULIN UR ELPH-MCNC: 3.7 GM/DL
GLOBULIN UR ELPH-MCNC: 4 GM/DL
GLUCOSE SERPL-MCNC: 80 MG/DL (ref 65–99)
GLUCOSE SERPL-MCNC: 89 MG/DL (ref 65–99)
GLUCOSE UR STRIP-MCNC: NEGATIVE MG/DL
HBA1C MFR BLD: 5.6 % (ref 4.8–5.6)
HCT VFR BLD AUTO: 32.2 % (ref 34–46.6)
HGB BLD-MCNC: 10.3 G/DL (ref 12–15.9)
HGB UR QL STRIP.AUTO: ABNORMAL
HYALINE CASTS UR QL AUTO: ABNORMAL /LPF
IMM GRANULOCYTES # BLD AUTO: 0.02 10*3/MM3 (ref 0–0.05)
IMM GRANULOCYTES NFR BLD AUTO: 0.3 % (ref 0–0.5)
INR PPP: 1.04 (ref 0.9–1.1)
KETONES UR QL STRIP: NEGATIVE
LEUKOCYTE ESTERASE UR QL STRIP.AUTO: ABNORMAL
LYMPHOCYTES # BLD AUTO: 1.24 10*3/MM3 (ref 0.7–3.1)
LYMPHOCYTES NFR BLD AUTO: 17.9 % (ref 19.6–45.3)
MAGNESIUM SERPL-MCNC: 2.7 MG/DL (ref 1.6–2.4)
MCH RBC QN AUTO: 31.1 PG (ref 26.6–33)
MCHC RBC AUTO-ENTMCNC: 32 G/DL (ref 31.5–35.7)
MCV RBC AUTO: 97.3 FL (ref 79–97)
METHADONE UR QL SCN: NEGATIVE
MONOCYTES # BLD AUTO: 0.5 10*3/MM3 (ref 0.1–0.9)
MONOCYTES NFR BLD AUTO: 7.2 % (ref 5–12)
NEUTROPHILS NFR BLD AUTO: 4.99 10*3/MM3 (ref 1.7–7)
NEUTROPHILS NFR BLD AUTO: 71.9 % (ref 42.7–76)
NITRITE UR QL STRIP: NEGATIVE
NRBC BLD AUTO-RTO: 0 /100 WBC (ref 0–0.2)
NT-PROBNP SERPL-MCNC: 741.8 PG/ML (ref 0–1800)
OPIATES UR QL: NEGATIVE
OXYCODONE UR QL SCN: NEGATIVE
PCP UR QL SCN: NEGATIVE
PH UR STRIP.AUTO: 8 [PH] (ref 5–8)
PLATELET # BLD AUTO: 339 10*3/MM3 (ref 140–450)
PMV BLD AUTO: 8.9 FL (ref 6–12)
POTASSIUM SERPL-SCNC: 5.1 MMOL/L (ref 3.5–5.2)
POTASSIUM SERPL-SCNC: 6.6 MMOL/L (ref 3.5–5.2)
PROT SERPL-MCNC: 7.3 G/DL (ref 6–8.5)
PROT SERPL-MCNC: 7.9 G/DL (ref 6–8.5)
PROT UR QL STRIP: ABNORMAL
PROTHROMBIN TIME: 13.7 SECONDS (ref 12.1–14.7)
QT INTERVAL: 380 MS
QT INTERVAL: 384 MS
QTC INTERVAL: 441 MS
QTC INTERVAL: 442 MS
RBC # BLD AUTO: 3.31 10*6/MM3 (ref 3.77–5.28)
RBC # UR STRIP: ABNORMAL /HPF
REF LAB TEST METHOD: ABNORMAL
SODIUM SERPL-SCNC: 137 MMOL/L (ref 136–145)
SODIUM SERPL-SCNC: 138 MMOL/L (ref 136–145)
SP GR UR STRIP: 1.01 (ref 1–1.03)
SQUAMOUS #/AREA URNS HPF: ABNORMAL /HPF
T4 FREE SERPL-MCNC: 1.57 NG/DL (ref 0.93–1.7)
TRICYCLICS UR QL SCN: NEGATIVE
TROPONIN T DELTA: -4 NG/L
TROPONIN T SERPL HS-MCNC: 27 NG/L
TSH SERPL DL<=0.05 MIU/L-ACNC: 1.93 UIU/ML (ref 0.27–4.2)
UROBILINOGEN UR QL STRIP: ABNORMAL
WBC # UR STRIP: ABNORMAL /HPF
WBC NRBC COR # BLD AUTO: 6.94 10*3/MM3 (ref 3.4–10.8)

## 2024-07-03 PROCEDURE — 81001 URINALYSIS AUTO W/SCOPE: CPT | Performed by: EMERGENCY MEDICINE

## 2024-07-03 PROCEDURE — 71045 X-RAY EXAM CHEST 1 VIEW: CPT | Performed by: RADIOLOGY

## 2024-07-03 PROCEDURE — 84439 ASSAY OF FREE THYROXINE: CPT | Performed by: EMERGENCY MEDICINE

## 2024-07-03 PROCEDURE — 85610 PROTHROMBIN TIME: CPT | Performed by: EMERGENCY MEDICINE

## 2024-07-03 PROCEDURE — 80307 DRUG TEST PRSMV CHEM ANLYZR: CPT | Performed by: HOSPITALIST

## 2024-07-03 PROCEDURE — 94640 AIRWAY INHALATION TREATMENT: CPT

## 2024-07-03 PROCEDURE — 94799 UNLISTED PULMONARY SVC/PX: CPT

## 2024-07-03 PROCEDURE — 80053 COMPREHEN METABOLIC PANEL: CPT | Performed by: EMERGENCY MEDICINE

## 2024-07-03 PROCEDURE — 84443 ASSAY THYROID STIM HORMONE: CPT | Performed by: EMERGENCY MEDICINE

## 2024-07-03 PROCEDURE — 82570 ASSAY OF URINE CREATININE: CPT | Performed by: HOSPITALIST

## 2024-07-03 PROCEDURE — 83036 HEMOGLOBIN GLYCOSYLATED A1C: CPT | Performed by: HOSPITALIST

## 2024-07-03 PROCEDURE — 25810000003 SODIUM CHLORIDE 0.9 % SOLUTION: Performed by: EMERGENCY MEDICINE

## 2024-07-03 PROCEDURE — 63710000001 INSULIN REGULAR HUMAN PER 5 UNITS: Performed by: EMERGENCY MEDICINE

## 2024-07-03 PROCEDURE — 84156 ASSAY OF PROTEIN URINE: CPT | Performed by: HOSPITALIST

## 2024-07-03 PROCEDURE — 85025 COMPLETE CBC W/AUTO DIFF WBC: CPT | Performed by: NURSE PRACTITIONER

## 2024-07-03 PROCEDURE — 86140 C-REACTIVE PROTEIN: CPT | Performed by: EMERGENCY MEDICINE

## 2024-07-03 PROCEDURE — 74176 CT ABD & PELVIS W/O CONTRAST: CPT

## 2024-07-03 PROCEDURE — 84484 ASSAY OF TROPONIN QUANT: CPT | Performed by: EMERGENCY MEDICINE

## 2024-07-03 PROCEDURE — 82550 ASSAY OF CK (CPK): CPT | Performed by: EMERGENCY MEDICINE

## 2024-07-03 PROCEDURE — 83735 ASSAY OF MAGNESIUM: CPT | Performed by: NURSE PRACTITIONER

## 2024-07-03 PROCEDURE — 25010000002 HEPARIN (PORCINE) PER 1000 UNITS: Performed by: HOSPITALIST

## 2024-07-03 PROCEDURE — 25810000003 SODIUM CHLORIDE 0.9 % SOLUTION: Performed by: HOSPITALIST

## 2024-07-03 PROCEDURE — 80053 COMPREHEN METABOLIC PANEL: CPT | Performed by: NURSE PRACTITIONER

## 2024-07-03 PROCEDURE — 99285 EMERGENCY DEPT VISIT HI MDM: CPT

## 2024-07-03 PROCEDURE — 93010 ELECTROCARDIOGRAM REPORT: CPT | Performed by: INTERNAL MEDICINE

## 2024-07-03 PROCEDURE — 85652 RBC SED RATE AUTOMATED: CPT | Performed by: EMERGENCY MEDICINE

## 2024-07-03 PROCEDURE — 36415 COLL VENOUS BLD VENIPUNCTURE: CPT | Performed by: NURSE PRACTITIONER

## 2024-07-03 PROCEDURE — 71045 X-RAY EXAM CHEST 1 VIEW: CPT

## 2024-07-03 PROCEDURE — 93005 ELECTROCARDIOGRAM TRACING: CPT | Performed by: EMERGENCY MEDICINE

## 2024-07-03 PROCEDURE — 83880 ASSAY OF NATRIURETIC PEPTIDE: CPT | Performed by: EMERGENCY MEDICINE

## 2024-07-03 PROCEDURE — 36415 COLL VENOUS BLD VENIPUNCTURE: CPT

## 2024-07-03 PROCEDURE — 74176 CT ABD & PELVIS W/O CONTRAST: CPT | Performed by: RADIOLOGY

## 2024-07-03 PROCEDURE — 99223 1ST HOSP IP/OBS HIGH 75: CPT | Performed by: HOSPITALIST

## 2024-07-03 PROCEDURE — 85730 THROMBOPLASTIN TIME PARTIAL: CPT | Performed by: EMERGENCY MEDICINE

## 2024-07-03 RX ORDER — HYDRALAZINE HYDROCHLORIDE 10 MG/1
10 TABLET, FILM COATED ORAL EVERY 8 HOURS PRN
Status: DISCONTINUED | OUTPATIENT
Start: 2024-07-03 | End: 2024-07-10 | Stop reason: HOSPADM

## 2024-07-03 RX ORDER — INSULIN LISPRO 100 [IU]/ML
2-7 INJECTION, SOLUTION INTRAVENOUS; SUBCUTANEOUS
Status: DISCONTINUED | OUTPATIENT
Start: 2024-07-03 | End: 2024-07-10 | Stop reason: HOSPADM

## 2024-07-03 RX ORDER — ONDANSETRON 2 MG/ML
4 INJECTION INTRAMUSCULAR; INTRAVENOUS EVERY 6 HOURS PRN
Status: DISCONTINUED | OUTPATIENT
Start: 2024-07-03 | End: 2024-07-10 | Stop reason: HOSPADM

## 2024-07-03 RX ORDER — MONTELUKAST SODIUM 10 MG/1
10 TABLET ORAL NIGHTLY
COMMUNITY

## 2024-07-03 RX ORDER — TORSEMIDE 20 MG/1
20 TABLET ORAL 2 TIMES DAILY
COMMUNITY
End: 2024-07-10 | Stop reason: HOSPADM

## 2024-07-03 RX ORDER — SODIUM POLYSTYRENE SULFONATE 4.1 MEQ/G
30 POWDER, FOR SUSPENSION ORAL; RECTAL ONCE
Status: COMPLETED | OUTPATIENT
Start: 2024-07-03 | End: 2024-07-03

## 2024-07-03 RX ORDER — TIMOLOL MALEATE 5 MG/ML
1 SOLUTION/ DROPS OPHTHALMIC 2 TIMES DAILY
COMMUNITY

## 2024-07-03 RX ORDER — ALENDRONATE SODIUM 70 MG/1
70 TABLET ORAL
COMMUNITY

## 2024-07-03 RX ORDER — DEXTROSE MONOHYDRATE 25 G/50ML
25 INJECTION, SOLUTION INTRAVENOUS ONCE
Status: COMPLETED | OUTPATIENT
Start: 2024-07-03 | End: 2024-07-03

## 2024-07-03 RX ORDER — BISACODYL 10 MG
10 SUPPOSITORY, RECTAL RECTAL DAILY PRN
Status: DISCONTINUED | OUTPATIENT
Start: 2024-07-03 | End: 2024-07-10 | Stop reason: HOSPADM

## 2024-07-03 RX ORDER — SIMVASTATIN 20 MG
20 TABLET ORAL NIGHTLY
COMMUNITY
End: 2024-07-10 | Stop reason: HOSPADM

## 2024-07-03 RX ORDER — AMOXICILLIN 250 MG
2 CAPSULE ORAL 2 TIMES DAILY
Status: DISCONTINUED | OUTPATIENT
Start: 2024-07-03 | End: 2024-07-10 | Stop reason: HOSPADM

## 2024-07-03 RX ORDER — GLUCAGON 1 MG/ML
1 KIT INJECTION
Status: DISCONTINUED | OUTPATIENT
Start: 2024-07-03 | End: 2024-07-10 | Stop reason: HOSPADM

## 2024-07-03 RX ORDER — METOPROLOL SUCCINATE 100 MG/1
100 TABLET, EXTENDED RELEASE ORAL DAILY
COMMUNITY

## 2024-07-03 RX ORDER — TORSEMIDE 20 MG/1
20 TABLET ORAL 2 TIMES DAILY
Status: CANCELLED | OUTPATIENT
Start: 2024-07-04

## 2024-07-03 RX ORDER — OXYBUTYNIN CHLORIDE 5 MG/1
5 TABLET, EXTENDED RELEASE ORAL DAILY
COMMUNITY

## 2024-07-03 RX ORDER — CAPTOPRIL 12.5 MG/1
12.5 TABLET ORAL DAILY
COMMUNITY
End: 2024-07-10 | Stop reason: HOSPADM

## 2024-07-03 RX ORDER — HEPARIN SODIUM 5000 [USP'U]/ML
5000 INJECTION, SOLUTION INTRAVENOUS; SUBCUTANEOUS EVERY 12 HOURS SCHEDULED
Status: DISCONTINUED | OUTPATIENT
Start: 2024-07-03 | End: 2024-07-10 | Stop reason: HOSPADM

## 2024-07-03 RX ORDER — DEXTROSE MONOHYDRATE 25 G/50ML
25 INJECTION, SOLUTION INTRAVENOUS
Status: DISCONTINUED | OUTPATIENT
Start: 2024-07-03 | End: 2024-07-10 | Stop reason: HOSPADM

## 2024-07-03 RX ORDER — SODIUM CHLORIDE 0.9 % (FLUSH) 0.9 %
10 SYRINGE (ML) INJECTION EVERY 12 HOURS SCHEDULED
Status: DISCONTINUED | OUTPATIENT
Start: 2024-07-03 | End: 2024-07-10 | Stop reason: HOSPADM

## 2024-07-03 RX ORDER — NICOTINE POLACRILEX 4 MG
15 LOZENGE BUCCAL
Status: DISCONTINUED | OUTPATIENT
Start: 2024-07-03 | End: 2024-07-10 | Stop reason: HOSPADM

## 2024-07-03 RX ORDER — SODIUM CHLORIDE 9 MG/ML
40 INJECTION, SOLUTION INTRAVENOUS AS NEEDED
Status: DISCONTINUED | OUTPATIENT
Start: 2024-07-03 | End: 2024-07-10 | Stop reason: HOSPADM

## 2024-07-03 RX ORDER — ALBUTEROL SULFATE 2.5 MG/3ML
10 SOLUTION RESPIRATORY (INHALATION) ONCE
Status: COMPLETED | OUTPATIENT
Start: 2024-07-03 | End: 2024-07-03

## 2024-07-03 RX ORDER — POLYETHYLENE GLYCOL 3350 17 G/17G
17 POWDER, FOR SOLUTION ORAL DAILY PRN
Status: DISCONTINUED | OUTPATIENT
Start: 2024-07-03 | End: 2024-07-10 | Stop reason: HOSPADM

## 2024-07-03 RX ORDER — SODIUM CHLORIDE 9 MG/ML
100 INJECTION, SOLUTION INTRAVENOUS CONTINUOUS
Status: DISCONTINUED | OUTPATIENT
Start: 2024-07-03 | End: 2024-07-05

## 2024-07-03 RX ORDER — NITROGLYCERIN 0.4 MG/1
0.4 TABLET SUBLINGUAL
Status: DISCONTINUED | OUTPATIENT
Start: 2024-07-03 | End: 2024-07-10 | Stop reason: HOSPADM

## 2024-07-03 RX ORDER — ASPIRIN 325 MG
325 TABLET, DELAYED RELEASE (ENTERIC COATED) ORAL
COMMUNITY

## 2024-07-03 RX ORDER — LEVOTHYROXINE SODIUM 0.03 MG/1
25 TABLET ORAL DAILY
COMMUNITY

## 2024-07-03 RX ORDER — AMLODIPINE BESYLATE 10 MG/1
10 TABLET ORAL DAILY
Status: ON HOLD | COMMUNITY
End: 2024-07-03

## 2024-07-03 RX ORDER — OMEPRAZOLE 40 MG/1
40 CAPSULE, DELAYED RELEASE ORAL DAILY
COMMUNITY

## 2024-07-03 RX ORDER — BISACODYL 5 MG/1
5 TABLET, DELAYED RELEASE ORAL DAILY PRN
Status: DISCONTINUED | OUTPATIENT
Start: 2024-07-03 | End: 2024-07-10 | Stop reason: HOSPADM

## 2024-07-03 RX ORDER — SODIUM CHLORIDE 0.9 % (FLUSH) 0.9 %
10 SYRINGE (ML) INJECTION AS NEEDED
Status: DISCONTINUED | OUTPATIENT
Start: 2024-07-03 | End: 2024-07-10 | Stop reason: HOSPADM

## 2024-07-03 RX ADMIN — SODIUM CHLORIDE 100 ML/HR: 9 INJECTION, SOLUTION INTRAVENOUS at 22:28

## 2024-07-03 RX ADMIN — SODIUM POLYSTYRENE SULFONATE 30 G: 1 POWDER ORAL; RECTAL at 17:09

## 2024-07-03 RX ADMIN — DEXTROSE MONOHYDRATE 25 G: 25 INJECTION, SOLUTION INTRAVENOUS at 16:51

## 2024-07-03 RX ADMIN — DOCUSATE SODIUM 50 MG AND SENNOSIDES 8.6 MG 2 TABLET: 8.6; 5 TABLET, FILM COATED ORAL at 22:28

## 2024-07-03 RX ADMIN — ALBUTEROL SULFATE 10 MG: 2.5 SOLUTION RESPIRATORY (INHALATION) at 16:32

## 2024-07-03 RX ADMIN — HEPARIN SODIUM 5000 UNITS: 5000 INJECTION INTRAVENOUS; SUBCUTANEOUS at 22:28

## 2024-07-03 RX ADMIN — Medication 10 ML: at 22:28

## 2024-07-03 RX ADMIN — SODIUM CHLORIDE 1000 ML: 9 INJECTION, SOLUTION INTRAVENOUS at 16:51

## 2024-07-03 RX ADMIN — INSULIN HUMAN 10 UNITS: 100 INJECTION, SOLUTION PARENTERAL at 16:51

## 2024-07-03 NOTE — ED NOTES
MEDICAL SCREENING:    Reason for Visit: Sent for abnormal labs. Abnormal kidney function tests and potassium    Patient initially seen in triage.  The patient was advised further evaluation and diagnostic testing will be needed, some of the treatment and testing will be initiated in the lobby in order to begin the process.  The patient will be returned to the waiting area for the time being and possibly be re-assessed by a subsequent ED provider.  The patient will be brought back to the treatment area in as timely manner as possible.      Curtis Nesbitt, APRN  07/03/24 1455
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
## 2024-07-04 LAB
ALBUMIN SERPL-MCNC: 3.1 G/DL (ref 3.5–5.2)
ALBUMIN/GLOB SERPL: 1 G/DL
ALP SERPL-CCNC: 59 U/L (ref 39–117)
ALT SERPL W P-5'-P-CCNC: <5 U/L (ref 1–33)
ANION GAP SERPL CALCULATED.3IONS-SCNC: 11.4 MMOL/L (ref 5–15)
AST SERPL-CCNC: 11 U/L (ref 1–32)
BASOPHILS # BLD AUTO: 0.02 10*3/MM3 (ref 0–0.2)
BASOPHILS NFR BLD AUTO: 0.3 % (ref 0–1.5)
BILIRUB SERPL-MCNC: 0.2 MG/DL (ref 0–1.2)
BUN SERPL-MCNC: 71 MG/DL (ref 8–23)
BUN/CREAT SERPL: 14.2 (ref 7–25)
CALCIUM SPEC-SCNC: 7.4 MG/DL (ref 8.6–10.5)
CHLORIDE SERPL-SCNC: 105 MMOL/L (ref 98–107)
CO2 SERPL-SCNC: 22.6 MMOL/L (ref 22–29)
CREAT SERPL-MCNC: 5.01 MG/DL (ref 0.57–1)
CREAT UR-MCNC: 40.4 MG/DL
DEPRECATED RDW RBC AUTO: 45.4 FL (ref 37–54)
EGFRCR SERPLBLD CKD-EPI 2021: 8.4 ML/MIN/1.73
EOSINOPHIL # BLD AUTO: 0.16 10*3/MM3 (ref 0–0.4)
EOSINOPHIL NFR BLD AUTO: 2 % (ref 0.3–6.2)
ERYTHROCYTE [DISTWIDTH] IN BLOOD BY AUTOMATED COUNT: 12.6 % (ref 12.3–15.4)
GLOBULIN UR ELPH-MCNC: 3.1 GM/DL
GLUCOSE BLDC GLUCOMTR-MCNC: 102 MG/DL (ref 70–130)
GLUCOSE BLDC GLUCOMTR-MCNC: 141 MG/DL (ref 70–130)
GLUCOSE BLDC GLUCOMTR-MCNC: 157 MG/DL (ref 70–130)
GLUCOSE BLDC GLUCOMTR-MCNC: 85 MG/DL (ref 70–130)
GLUCOSE SERPL-MCNC: 133 MG/DL (ref 65–99)
HCT VFR BLD AUTO: 28.4 % (ref 34–46.6)
HGB BLD-MCNC: 9.1 G/DL (ref 12–15.9)
IMM GRANULOCYTES # BLD AUTO: 0.03 10*3/MM3 (ref 0–0.05)
IMM GRANULOCYTES NFR BLD AUTO: 0.4 % (ref 0–0.5)
LYMPHOCYTES # BLD AUTO: 1.32 10*3/MM3 (ref 0.7–3.1)
LYMPHOCYTES NFR BLD AUTO: 16.8 % (ref 19.6–45.3)
MCH RBC QN AUTO: 31.4 PG (ref 26.6–33)
MCHC RBC AUTO-ENTMCNC: 32 G/DL (ref 31.5–35.7)
MCV RBC AUTO: 97.9 FL (ref 79–97)
MONOCYTES # BLD AUTO: 0.64 10*3/MM3 (ref 0.1–0.9)
MONOCYTES NFR BLD AUTO: 8.1 % (ref 5–12)
NEUTROPHILS NFR BLD AUTO: 5.7 10*3/MM3 (ref 1.7–7)
NEUTROPHILS NFR BLD AUTO: 72.4 % (ref 42.7–76)
NRBC BLD AUTO-RTO: 0 /100 WBC (ref 0–0.2)
PLATELET # BLD AUTO: 285 10*3/MM3 (ref 140–450)
PMV BLD AUTO: 8.8 FL (ref 6–12)
POTASSIUM SERPL-SCNC: 5.1 MMOL/L (ref 3.5–5.2)
PROT ?TM UR-MCNC: 50.1 MG/DL
PROT SERPL-MCNC: 6.2 G/DL (ref 6–8.5)
PROT/CREAT UR: 1240.1 MG/G CREA (ref 0–200)
RBC # BLD AUTO: 2.9 10*6/MM3 (ref 3.77–5.28)
SODIUM SERPL-SCNC: 139 MMOL/L (ref 136–145)
WBC NRBC COR # BLD AUTO: 7.87 10*3/MM3 (ref 3.4–10.8)

## 2024-07-04 PROCEDURE — 25810000003 SODIUM CHLORIDE 0.9 % SOLUTION: Performed by: HOSPITALIST

## 2024-07-04 PROCEDURE — 82948 REAGENT STRIP/BLOOD GLUCOSE: CPT

## 2024-07-04 PROCEDURE — 99232 SBSQ HOSP IP/OBS MODERATE 35: CPT

## 2024-07-04 PROCEDURE — 25010000002 HEPARIN (PORCINE) PER 1000 UNITS: Performed by: HOSPITALIST

## 2024-07-04 PROCEDURE — 85025 COMPLETE CBC W/AUTO DIFF WBC: CPT | Performed by: HOSPITALIST

## 2024-07-04 PROCEDURE — 80053 COMPREHEN METABOLIC PANEL: CPT | Performed by: HOSPITALIST

## 2024-07-04 RX ORDER — OXYBUTYNIN CHLORIDE 5 MG/1
5 TABLET, EXTENDED RELEASE ORAL DAILY
Status: DISCONTINUED | OUTPATIENT
Start: 2024-07-04 | End: 2024-07-10 | Stop reason: HOSPADM

## 2024-07-04 RX ORDER — ATORVASTATIN CALCIUM 10 MG/1
10 TABLET, FILM COATED ORAL DAILY
Status: DISCONTINUED | OUTPATIENT
Start: 2024-07-04 | End: 2024-07-10 | Stop reason: HOSPADM

## 2024-07-04 RX ORDER — ASPIRIN 325 MG
325 TABLET, DELAYED RELEASE (ENTERIC COATED) ORAL NIGHTLY
Status: DISCONTINUED | OUTPATIENT
Start: 2024-07-04 | End: 2024-07-10 | Stop reason: HOSPADM

## 2024-07-04 RX ORDER — PANTOPRAZOLE SODIUM 40 MG/1
40 TABLET, DELAYED RELEASE ORAL
Status: DISCONTINUED | OUTPATIENT
Start: 2024-07-04 | End: 2024-07-10 | Stop reason: HOSPADM

## 2024-07-04 RX ORDER — MONTELUKAST SODIUM 10 MG/1
10 TABLET ORAL NIGHTLY
Status: DISCONTINUED | OUTPATIENT
Start: 2024-07-04 | End: 2024-07-10 | Stop reason: HOSPADM

## 2024-07-04 RX ORDER — LEVOTHYROXINE SODIUM 0.03 MG/1
25 TABLET ORAL DAILY
Status: DISCONTINUED | OUTPATIENT
Start: 2024-07-04 | End: 2024-07-10 | Stop reason: HOSPADM

## 2024-07-04 RX ORDER — METOPROLOL SUCCINATE 50 MG/1
50 TABLET, EXTENDED RELEASE ORAL DAILY
Status: DISCONTINUED | OUTPATIENT
Start: 2024-07-04 | End: 2024-07-10 | Stop reason: HOSPADM

## 2024-07-04 RX ORDER — TIMOLOL MALEATE 5 MG/ML
1 SOLUTION/ DROPS OPHTHALMIC 2 TIMES DAILY
Status: DISCONTINUED | OUTPATIENT
Start: 2024-07-04 | End: 2024-07-10 | Stop reason: HOSPADM

## 2024-07-04 RX ADMIN — ASPIRIN 325 MG: 325 TABLET, COATED ORAL at 21:42

## 2024-07-04 RX ADMIN — LEVOTHYROXINE SODIUM 25 MCG: 25 TABLET ORAL at 10:07

## 2024-07-04 RX ADMIN — TIMOLOL MALEATE 1 DROP: 5 SOLUTION OPHTHALMIC at 13:40

## 2024-07-04 RX ADMIN — ATORVASTATIN CALCIUM 10 MG: 10 TABLET, FILM COATED ORAL at 10:07

## 2024-07-04 RX ADMIN — OXYBUTYNIN CHLORIDE 5 MG: 5 TABLET, EXTENDED RELEASE ORAL at 10:07

## 2024-07-04 RX ADMIN — HEPARIN SODIUM 5000 UNITS: 5000 INJECTION INTRAVENOUS; SUBCUTANEOUS at 21:42

## 2024-07-04 RX ADMIN — TIMOLOL MALEATE 1 DROP: 5 SOLUTION OPHTHALMIC at 21:43

## 2024-07-04 RX ADMIN — DOCUSATE SODIUM 50 MG AND SENNOSIDES 8.6 MG 2 TABLET: 8.6; 5 TABLET, FILM COATED ORAL at 09:23

## 2024-07-04 RX ADMIN — PANTOPRAZOLE SODIUM 40 MG: 40 TABLET, DELAYED RELEASE ORAL at 10:07

## 2024-07-04 RX ADMIN — SODIUM CHLORIDE 100 ML/HR: 9 INJECTION, SOLUTION INTRAVENOUS at 09:24

## 2024-07-04 RX ADMIN — Medication 10 ML: at 09:24

## 2024-07-04 RX ADMIN — SODIUM CHLORIDE 100 ML/HR: 9 INJECTION, SOLUTION INTRAVENOUS at 21:41

## 2024-07-04 RX ADMIN — METOPROLOL SUCCINATE 50 MG: 50 TABLET, EXTENDED RELEASE ORAL at 10:07

## 2024-07-04 RX ADMIN — DOCUSATE SODIUM 50 MG AND SENNOSIDES 8.6 MG 2 TABLET: 8.6; 5 TABLET, FILM COATED ORAL at 21:42

## 2024-07-04 RX ADMIN — MONTELUKAST SODIUM 10 MG: 10 TABLET, COATED ORAL at 21:42

## 2024-07-04 RX ADMIN — HEPARIN SODIUM 5000 UNITS: 5000 INJECTION INTRAVENOUS; SUBCUTANEOUS at 09:23

## 2024-07-04 NOTE — PLAN OF CARE
Goal Outcome Evaluation:   Pt resting in bed No c/o pain Alert oriented and in no distress. Has ambulated in room today and sat in chair. Will continue to monitor and follow plan of care.

## 2024-07-04 NOTE — H&P
Hospitalist History and Physical        Patient Identification  Name: Aggie Perkins  Age/Sex: 79 y.o. female  :  1945        MRN: [id number]  Visit Number: [id number]  Admit Date: 7/3/2024   PCP: Sarah Samuel APRN          Chief complaint generalized weakness, abnormal labs (potassium elevated)    History of Present Illness:  Patient is a 79 y.o. female with history of arthritis, type II DM, hypothyroidism, pulmonary HTN, HTN, HLD, grade 1 diastolic CHF and mild cognitive impairment with speech impediment since birth per sister at bedside, who learned about 4 months ago that she had CKD. She thought she was stage III at that time, but on 3/21/24 labs showed creatinine 2.47 with GFR 19.8 which would classify her as stage IV CKD. She has been following up with a nephrologist since then. She had lab work drawn yesterday and was called today and instructed she needed to come to the ED because her potassium was elevated. Upon further questioning, the patient reports she has been feeling progressively weaker over the last few months. She got so weak that she fell about a week ago. She had an isolated episode of nausea with vomiting around that time. She has not had any further vomiting and denies diarrhea, but she has had some intermittent nausea when she eats and admits she has had poor appetite and poor PO intake as a result. She reports some dyspnea with exertion but no cough or sputum production. She denies chest pain or lower extremity edema. She is a poor historian and seems to get lost in her thought processing frequently, but her sister at bedside states her mentation and speech are essentially at baseline. In the ED, patient was tachycardic and SBP ranged from 100s to 170s. Other vitals were stable. Labs showed elevated K+ of 6.6, BUN 78, Cr 5.28, ratio 14.8, GFR 7.9, mag 2.7. LFT's were normal. A1c and thyroid function studies were normal. CRP was marginally elevated at 0.55. CBC was unremarkable  with exception of mildly low hemoglobin of 10.3. UA showed moderate blood, trace leuk, 100 protein, 21-50 RBC, 3-5 WBC and trace bacteria. Patient denies any urinary symptoms other than decreased urine output. UDS was negative. Patient received treatment per hyperkalemia protocol and a 1L IV fluid bolus. Repeat labs showed K+ 5.1, BUN 72 and Cr 4.78. She is being admitted to the telemetry unit for further workup and management.     Review of Systems  Review of Systems   Constitutional:  Positive for appetite change and fatigue. Negative for chills, diaphoresis, fever and unexpected weight change.   HENT:  Negative for postnasal drip, rhinorrhea, sinus pressure, sinus pain and sore throat.    Eyes:  Negative for photophobia and visual disturbance.   Respiratory:  Positive for shortness of breath. Negative for cough and wheezing.    Cardiovascular:  Negative for chest pain, palpitations and leg swelling.   Gastrointestinal:  Positive for nausea and vomiting (last week, but none since). Negative for abdominal distention, abdominal pain, constipation and diarrhea.   Genitourinary:  Positive for decreased urine volume. Negative for difficulty urinating, dysuria, flank pain and frequency.   Musculoskeletal:  Negative for arthralgias, back pain, joint swelling, myalgias, neck pain and neck stiffness.   Skin:  Negative for color change, pallor, rash and wound.   Neurological:  Positive for speech difficulty (baseline) and weakness (generalized). Negative for dizziness, tremors, seizures, syncope, light-headedness, numbness and headaches.   Hematological:  Negative for adenopathy. Does not bruise/bleed easily.   Psychiatric/Behavioral:  Negative for agitation, behavioral problems and confusion.        History  Past Medical History:   Diagnosis Date    Arthritis     Diabetes mellitus     Disease of thyroid gland     Hyperlipidemia     Hypertension     PHT (pulmonary hypertension) 3/30/2023     *  Grade 1a diastolic  CHF    Past Surgical History:   Procedure Laterality Date    CARDIOVASCULAR STRESS TEST  03/20/2023    @ [facility]. . Lexiscan- EF 70%. negative    CHOLECYSTECTOMY      COLONOSCOPY  2015    COLONOSCOPY N/A 03/23/2018    Procedure: COLONOSCOPY CPT CODE: 83868;  Surgeon: Rinku Urias III, MD;  Location: [facility];  Service: Gastroenterology    ECHO - CONVERTED  03/06/2023    @ [facility]. - EF 65%SARITA-1.7 Fc6YTND- 52 mmHg    ECHO - CONVERTED  11/07/2023    [facility]. EF 60%. LA- 3.8. MVA-1.9. Trace-Mild MR & AI    ENDOSCOPY      ENDOSCOPY N/A 03/23/2018    Procedure: ESOPHAGOGASTRODUODENOSCOPY WITH BIOPSY CPT CODE: 82905;  Surgeon: Rinku Urias III, MD;  Location: [facility] OR;  Service: Gastroenterology    HYSTERECTOMY       Family History   Problem Relation Age of Onset    Colon cancer Mother     Colon cancer Father     Colon cancer Brother      Social History     Tobacco Use    Smoking status: Never    Smokeless tobacco: Never   Vaping Use    Vaping status: Never Used   Substance Use Topics    Alcohol use: No    Drug use: No     (Not in a hospital admission)    Allergies:  Patient has no known allergies.    Objective     Vital Signs  Temp:  [97.8 °F (36.6 °C)] 97.8 °F (36.6 °C)  Heart Rate:  [] 107  Resp:  [18] 18  BP: (108-175)/(52-97) 175/88  Body mass index is 35.92 kg/m².    Physical Exam:  Physical Exam  Constitutional:       General: She is not in acute distress.     Appearance: She is ill-appearing (chronically so).   HENT:      Head: Normocephalic and atraumatic.      Right Ear: External ear normal.      Left Ear: External ear normal.      Nose: Nose normal.      Mouth/Throat:      Mouth: Mucous membranes are dry.      Pharynx: Oropharynx is clear.   Eyes:      Extraocular Movements: Extraocular movements intact.      Conjunctiva/sclera: Conjunctivae normal.      Pupils: Pupils are equal, round, and reactive to light.   Cardiovascular:      Rate and Rhythm: Regular rhythm. Tachycardia  present.      Pulses: Normal pulses.      Heart sounds: Normal heart sounds. No murmur heard.  Pulmonary:      Effort: Pulmonary effort is normal. No respiratory distress.      Breath sounds: Normal breath sounds. No wheezing or rales.   Abdominal:      General: Abdomen is flat. Bowel sounds are normal. There is no distension.      Palpations: Abdomen is soft.      Tenderness: There is no abdominal tenderness.   Musculoskeletal:         General: Normal range of motion.      Cervical back: Normal range of motion and neck supple. No tenderness.      Right lower leg: No edema.      Left lower leg: No edema.   Lymphadenopathy:      Cervical: No cervical adenopathy.   Skin:     General: Skin is warm and dry.      Capillary Refill: Capillary refill takes less than 2 seconds.      Coloration: Skin is not jaundiced.      Findings: No bruising or lesion.   Neurological:      General: No focal deficit present.      Mental Status: She is alert.      Comments: Alert, oriented to self, place, year, but not president   Psychiatric:         Mood and Affect: Mood normal.         Behavior: Behavior normal.      Comments: Slow thought processing, baseline per sister           Results Review:       Lab Results:  Results from last 7 days   Lab Units 07/03/24  1530   WBC 10*3/mm3 6.94   HEMOGLOBIN g/dL 10.3*   PLATELETS 10*3/mm3 339     Results from last 7 days   Lab Units 07/03/24  1530   CRP mg/dL 0.55*     Results from last 7 days   Lab Units 07/03/24  1925 07/03/24  1530   SODIUM mmol/L 137 138   POTASSIUM mmol/L 5.1 6.6*   CHLORIDE mmol/L 101 99   CO2 mmol/L 22.0 24.6   BUN mg/dL 72* 78*   CREATININE mg/dL 4.78* 5.28*   CALCIUM mg/dL 8.0* 8.7   GLUCOSE mg/dL 80 89     Results from last 7 days   Lab Units 07/03/24  1530   MAGNESIUM mg/dL 2.7*     Hemoglobin A1C   Date Value Ref Range Status   07/03/2024 5.60 4.80 - 5.60 % Final     Results from last 7 days   Lab Units 07/03/24  1925 07/03/24  1530   BILIRUBIN mg/dL 0.3 0.3   ALK  PHOS U/L 70 72   AST (SGOT) U/L 12 11   ALT (SGPT) U/L 5 5     Results from last 7 days   Lab Units 07/03/24  1713 07/03/24  1530   CK TOTAL U/L  --  79   HSTROP T ng/L 23* 27*         Results from last 7 days   Lab Units 07/03/24  1638   INR  1.04           I have reviewed the patient's laboratory results.    Imaging:  Imaging Results (Last 72 Hours)       Procedure Component Value Units Date/Time    CT Abdomen Pelvis Without Contrast [[id number]] Collected: 07/03/24 1955     Updated: 07/03/24 1959    Narrative:      INDICATION: Abdominal pain.     COMPARISON: No relevant priors available.     TECHNIQUE: Axial CT images of the abdomen and pelvis were obtained  without IV contrast administration. Coronal and sagittal reformations  were reviewed.     FINDINGS:  . Calcified granulomas in the lung bases.     The liver, gallbladder, spleen, pancreas and adrenal glands appear  unremarkable. The gallbladder is surgically absent. Air in the biliary  tree likely relates to prior biliary intervention.     8 mm left renal calculus. No hydronephrosis. Nonspecific bilateral  perinephric fat stranding. Fat-containing umbilical hernia.     No evidence of bowel obstruction/colitis/appendicitis. No free air. No  drainable fluid collection.     The urinary bladder appears unremarkable. Fat-containing bilateral  inguinal hernias.     Mild degenerative changes in the spine. No acute osseous abnormality  evident.       Impression:      1.  No acute process in the abdomen or pelvis.  2.  Nonobstructing left renal calculus.  3.  Additional findings as above.        This report was finalized on 7/3/2024 7:57 PM by Alex Pallas, DO.       XR Chest 1 View [[id number]] Collected: 07/03/24 1720     Updated: 07/03/24 1722    Narrative:      XR CHEST 1 VW-     CLINICAL INDICATION: sob        COMPARISON: None immediately available      TECHNIQUE: Single frontal view of the chest.     FINDINGS:     LUNGS: Lungs are adequately aerated.      HEART AND  MEDIASTINUM: Heart and mediastinal contours are unremarkable        SKELETON: Bony and soft tissue structures are unremarkable.             Impression:      No radiographic evidence of acute cardiac or pulmonary disease.           This report was finalized on 7/3/2024 5:20 PM by Dr. Connor Sy MD.               I have personally reviewed the patient's radiologic imaging.        EKG:   Normal sinus rhythm, HR 80, QTc 442  Left axis deviation  Anterolateral infarct (cited on or before 03-JUL-2024)  Abnormal ECG  When compared with ECG of 03-JUL-2024 16:09, (Unconfirmed)  No significant change was found  Confirmed by Fabián Meza ([id number]) on 7/3/2024 5:36:35 PM    I have personally reviewed the patient's EKG. Q waves noted lead III, AVf, and all precordial leads. Somewhat similar findings on EKG from 3/30/23.         Assessment & Plan     - Acute on chronic renal failure: based on labs from 3/2024, patient had stage IV CKD at that time. Creatinine has since risen from 2.47 to 5.28. BUN 78, BUN/cr ratio 14.8. Could be multifactorial from poor PO intake (though this could be a side effect of uremia) and nephrotoxic effects of home meds (prelim list includes captopril and torsemide). Renal function improving with IV fluids thus far; continue to monitor. Avoid nephrotoxic agents. Obtain CT abdomen/pelvis to rule out obstructive uropathy. Check urine protein:cr ratio to look for nephrotic range proteinuria. Patient has blood noted on UA so nephritic syndrome on differential as well. Consult nephrology for further assistance in management.   - Hyperkalemia, secondary to above: improved from 6.6 to 5.1 following treatment per hyperkalemia protocol. Hold captopril as noted above. Continue to monitor closely.   - Type II DM, non insulin dependent: A1c 5.6 indicating good control. HOld oral meds and monitor accuchecks, cover with SSI if necessary.   - Hypertension: SBP has ranged from 100s to 170s since arrival. Holding  captopril and torsemide as noted above. Will make oral hydralazine available PRN for SBP>170.   - Grade 1 diastolic CHF: BNP normal and clinically is not fluid overloaded. Continue IV fluid hydration for acute on CKD. Monitor for signs/symptoms of developing fluid overload in the interim.  - Troponin elevation, flat trend: likely secondary to acute on CKD. Denies chest pain, EKG felt to show possible old infarcts in inferior and anterolateral leads but this could also represent LAFB seen on prior EKG's as well. No further workup planned at this time.    DVT Prophylaxis: SQ heparin    Estimated Length of Stay >2 midnights    I discussed the patient's findings, assessment and plan with the patient, her sister at bedside, and ED provider Dr Steen.    Patient is high risk due to acute on CKD stage IV and hyperkalemia    Romeo Dubois MD  07/03/24  20:01 EDT

## 2024-07-04 NOTE — PROGRESS NOTES
Patient Identification:  Name:  Aggie Perkins  Age:  79 y.o.  Sex:  female  :  1945  MRN:  [id number]  Visit Number:  [id number]  Primary Care Provider:  Sarah Samuel APRN    Length of stay:  1    Subjective/Interval History/Consultants/Procedures     Chief Complaint:   Chief Complaint   Patient presents with    Abnormal Lab       Subjective/Interval History:    79 y.o. female who was admitted on 7/3/2024 with acute on chronic renal failure    PMH is significant for CKD stage IV, T2DM, HTN, HLD, HFpEF, cognitive impairment   For complete admission information, please see history and physical.     Consultations:  Nephrology   PT  CM    Procedures/Scans:  CXR  CT abdomen and pelvis wo contrast     Today, the patient was seen and examined with her sister and RN at the bedside. Patient sitting up in bed, in no distress. No new complaints voiced today. She denied shortness of breath or abdominal pain. Per sisters report urine output seems to be at baseline.      Room location at the time of evaluation was 311b.    ----------------------------------------------------------------------------------------------------------------------  Current [facility] Meds:  aspirin, 325 mg, Oral, Nightly  atorvastatin, 10 mg, Oral, Daily  heparin (porcine), 5,000 Units, Subcutaneous, Q12H  insulin lispro, 2-7 Units, Subcutaneous, 4x Daily AC & at Bedtime  levothyroxine, 25 mcg, Oral, Daily  metoprolol succinate XL, 50 mg, Oral, Daily  montelukast, 10 mg, Oral, Nightly  oxybutynin XL, 5 mg, Oral, Daily  pantoprazole, 40 mg, Oral, Q AM  senna-docusate sodium, 2 tablet, Oral, BID  sodium chloride, 10 mL, Intravenous, Q12H  timolol, 1 drop, Both Eyes, BID      sodium chloride, 100 mL/hr, Last Rate: 100 mL/hr (24 0924)      ----------------------------------------------------------------------------------------------------------------------      Objective     Vital Signs:  Temp:  [97.8 °F (36.6 °C)-98.1 °F (36.7 °C)] 97.8 °F  (36.6 °C)  Heart Rate:  [] 96  Resp:  [18-20] 20  BP: (108-175)/(52-97) 138/65      07/03/24  1442 07/03/24 2016 07/04/24  0500   Weight: 75.3 kg (166 lb) 66.8 kg (147 lb 4.3 oz) 66.8 kg (147 lb 4.3 oz) (New admit less than 24 hours.)     Body mass index is 31.87 kg/m².    Intake/Output Summary (Last 24 hours) at 7/4/2024 1349  Last data filed at 7/4/2024 1100  Gross per 24 hour   Intake 1240 ml   Output --   Net 1240 ml     I/O this shift:  In: 240 [P.O.:240]  Out: -   Diet: Cardiac, Diabetic, Renal; Healthy Heart (2-3 Na+); Consistent Carbohydrate; Low Sodium (2-3g), Low Potassium, Low Phosphorus; Fluid Consistency: Thin (IDDSI 0)  ----------------------------------------------------------------------------------------------------------------------    Physical Exam  Vitals and nursing note reviewed.   Constitutional:       General: She is not in acute distress.     Appearance: She is obese.   HENT:      Head: Normocephalic and atraumatic.   Eyes:      Extraocular Movements: Extraocular movements intact.   Cardiovascular:      Rate and Rhythm: Normal rate.   Pulmonary:      Effort: Pulmonary effort is normal.      Breath sounds: Normal breath sounds.   Abdominal:      Palpations: Abdomen is soft.   Musculoskeletal:      Right lower leg: No edema.      Left lower leg: No edema.   Skin:     General: Skin is warm and dry.   Neurological:      Mental Status: She is alert. Mental status is at baseline.   Psychiatric:         Mood and Affect: Mood normal.         Behavior: Behavior normal.                ----------------------------------------------------------------------------------------------------------------------  Tele:      ----------------------------------------------------------------------------------------------------------------------  Results from last 7 days   Lab Units 07/03/24  1713 07/03/24  1530   CK TOTAL U/L  --  79   HSTROP T ng/L 23* 27*   PROBNP pg/mL  --  741.8     Results from last 7  "days   Lab Units 07/04/24  0117 07/03/24  1638 07/03/24  1530   CRP mg/dL  --   --  0.55*   WBC 10*3/mm3 7.87  --  6.94   HEMOGLOBIN g/dL 9.1*  --  10.3*   HEMATOCRIT % 28.4*  --  32.2*   MCV fL 97.9*  --  97.3*   MCHC g/dL 32.0  --  32.0   PLATELETS 10*3/mm3 285  --  339   INR   --  1.04  --          Results from last 7 days   Lab Units 07/04/24  0117 07/03/24  1925 07/03/24  1530   SODIUM mmol/L 139 137 138   POTASSIUM mmol/L 5.1 5.1 6.6*   MAGNESIUM mg/dL  --   --  2.7*   CHLORIDE mmol/L 105 101 99   CO2 mmol/L 22.6 22.0 24.6   BUN mg/dL 71* 72* 78*   CREATININE mg/dL 5.01* 4.78* 5.28*   CALCIUM mg/dL 7.4* 8.0* 8.7   GLUCOSE mg/dL 133* 80 89   ALBUMIN g/dL 3.1* 3.6 3.9   BILIRUBIN mg/dL 0.2 0.3 0.3   ALK PHOS U/L 59 70 72   AST (SGOT) U/L 11 12 11   ALT (SGPT) U/L <5 5 5   Estimated Creatinine Clearance: 8 mL/min (A) (by C-G formula based on SCr of 5.01 mg/dL (H)).  No results found for: \"AMMONIA\"      No results found for: \"BLOODCX\"  No results found for: \"URINECX\"  No results found for: \"WOUNDCX\"  No results found for: \"STOOLCX\"  ----------------------------------------------------------------------------------------------------------------------  Imaging Results (Last 24 Hours)       Procedure Component Value Units Date/Time    CT Abdomen Pelvis Without Contrast [[id number]] Collected: 07/03/24 1955     Updated: 07/03/24 1959    Narrative:      INDICATION: Abdominal pain.     COMPARISON: No relevant priors available.     TECHNIQUE: Axial CT images of the abdomen and pelvis were obtained  without IV contrast administration. Coronal and sagittal reformations  were reviewed.     FINDINGS:  . Calcified granulomas in the lung bases.     The liver, gallbladder, spleen, pancreas and adrenal glands appear  unremarkable. The gallbladder is surgically absent. Air in the biliary  tree likely relates to prior biliary intervention.     8 mm left renal calculus. No hydronephrosis. Nonspecific bilateral  perinephric fat " stranding. Fat-containing umbilical hernia.     No evidence of bowel obstruction/colitis/appendicitis. No free air. No  drainable fluid collection.     The urinary bladder appears unremarkable. Fat-containing bilateral  inguinal hernias.     Mild degenerative changes in the spine. No acute osseous abnormality  evident.       Impression:      1.  No acute process in the abdomen or pelvis.  2.  Nonobstructing left renal calculus.  3.  Additional findings as above.        This report was finalized on 7/3/2024 7:57 PM by Alex Pallas, DO.       XR Chest 1 View [[id number]] Collected: 07/03/24 1720     Updated: 07/03/24 1722    Narrative:      XR CHEST 1 VW-     CLINICAL INDICATION: sob        COMPARISON: None immediately available      TECHNIQUE: Single frontal view of the chest.     FINDINGS:     LUNGS: Lungs are adequately aerated.      HEART AND MEDIASTINUM: Heart and mediastinal contours are unremarkable        SKELETON: Bony and soft tissue structures are unremarkable.             Impression:      No radiographic evidence of acute cardiac or pulmonary disease.           This report was finalized on 7/3/2024 5:20 PM by Dr. Connor Sy MD.             ----------------------------------------------------------------------------------------------------------------------   I have reviewed the above laboratory values for 07/04/24    Assessment/Plan     Active Hospital Problems    Diagnosis  POA    **Acute on chronic renal failure [N17.9, N18.9]  Yes         ASSESSMENT/PLAN:    BO on CKD stage IV  Hyperkalemia, resolved  Patient with fairly recently diagnosed CKD-stage IV per most recent labs with creatinine 2.47 was sent to the ED after abnormal outpatient labs. Lab work in the ED showed creatinine 5.28 with BUN 78 and potassium 6.6.  CT of the abdomen and pelvis did not show any obstructive process.  Nephrology consulted for further assistance and recommendations, appreciated.  Concerned patient is going to require  dialysis. Checking iron panel in the AM  Continuing normal saline at 100 mL/h for now.  Avoid nephrotoxins as able, monitor I's and O's closely  Potassium improved to 5.1 following lokelma. Continue to monitor and correct electrolyte abnormalities as indicated  Repeat BMP in the AM    T2DM  A1c 5.6  Holding oral medications  Covering with SSI. Monitor and titrate as needed    Hypertension  Captopril, torsemide held given BO. Metoprolol continued. PRN hydralazine available  BP currently stable    Diastolic CHF, chronic, compensated  Holding torsemide and continuing metoprolol as above   Continue to monitor clinical volume status closely    HLD  Statin    Hypothyroidism  levothyroxine     -----------  -DVT prophylaxis: subcu heparin   -Disposition plans/anticipated needs: Pending clinical course         The patient is high risk due to the following diagnoses/reasons:  BO on CKD stage IV        Ti Sewell PA-C  07/04/24  13:49 EDT

## 2024-07-04 NOTE — PLAN OF CARE
Patient resting in the bed throughout the night. Family at bedside. No s/s of distress noted. Will continue to follow plan of care.
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
## 2024-07-05 LAB
ALBUMIN SERPL-MCNC: 3.3 G/DL (ref 3.5–5.2)
ANION GAP SERPL CALCULATED.3IONS-SCNC: 11.5 MMOL/L (ref 5–15)
ATMOSPHERIC PRESS: 724 MMHG
BASE EXCESS BLDV CALC-SCNC: -2.7 MMOL/L (ref 0–2)
BDY SITE: ABNORMAL
BUN SERPL-MCNC: 56 MG/DL (ref 8–23)
BUN/CREAT SERPL: 11.9 (ref 7–25)
CALCIUM SPEC-SCNC: 8.3 MG/DL (ref 8.6–10.5)
CHLORIDE SERPL-SCNC: 110 MMOL/L (ref 98–107)
CO2 BLDA-SCNC: 24 MMOL/L (ref 22–33)
CO2 SERPL-SCNC: 20.5 MMOL/L (ref 22–29)
COHGB MFR BLD: 1.5 % (ref 0–5)
CREAT SERPL-MCNC: 4.7 MG/DL (ref 0.57–1)
EGFRCR SERPLBLD CKD-EPI 2021: 9.1 ML/MIN/1.73
FERRITIN SERPL-MCNC: 77.27 NG/ML (ref 13–150)
GLUCOSE BLDC GLUCOMTR-MCNC: 111 MG/DL (ref 70–130)
GLUCOSE BLDC GLUCOMTR-MCNC: 93 MG/DL (ref 70–130)
GLUCOSE BLDC GLUCOMTR-MCNC: 96 MG/DL (ref 70–130)
GLUCOSE SERPL-MCNC: 99 MG/DL (ref 65–99)
HCO3 BLDV-SCNC: 22.7 MMOL/L (ref 22–28)
HGB BLDA-MCNC: 10 G/DL (ref 13.5–17.5)
INHALED O2 CONCENTRATION: 21 %
IRON 24H UR-MRATE: 58 MCG/DL (ref 37–145)
IRON SATN MFR SERPL: 20 % (ref 20–50)
Lab: ABNORMAL
METHGB BLD QL: 0.4 % (ref 0–3)
MODALITY: ABNORMAL
OXYHGB MFR BLDV: 47 % (ref 45–75)
PCO2 BLDV: 41 MM HG (ref 41–51)
PH BLDV: 7.35 PH UNITS (ref 7.32–7.42)
PHOSPHATE SERPL-MCNC: 4.6 MG/DL (ref 2.5–4.5)
PO2 BLDV: 24.8 MM HG (ref 27–53)
POTASSIUM SERPL-SCNC: 5 MMOL/L (ref 3.5–5.2)
SAO2 % BLDCOV: 47.9 % (ref 45–75)
SODIUM SERPL-SCNC: 142 MMOL/L (ref 136–145)
TIBC SERPL-MCNC: 295 MCG/DL (ref 298–536)
TRANSFERRIN SERPL-MCNC: 198 MG/DL (ref 200–360)
VENTILATOR MODE: ABNORMAL

## 2024-07-05 PROCEDURE — 25010000002 HEPARIN (PORCINE) PER 1000 UNITS: Performed by: HOSPITALIST

## 2024-07-05 PROCEDURE — 82948 REAGENT STRIP/BLOOD GLUCOSE: CPT

## 2024-07-05 PROCEDURE — 99232 SBSQ HOSP IP/OBS MODERATE 35: CPT

## 2024-07-05 PROCEDURE — 84466 ASSAY OF TRANSFERRIN: CPT | Performed by: INTERNAL MEDICINE

## 2024-07-05 PROCEDURE — 80069 RENAL FUNCTION PANEL: CPT | Performed by: INTERNAL MEDICINE

## 2024-07-05 PROCEDURE — 82805 BLOOD GASES W/O2 SATURATION: CPT

## 2024-07-05 PROCEDURE — 83540 ASSAY OF IRON: CPT | Performed by: INTERNAL MEDICINE

## 2024-07-05 PROCEDURE — 82820 HEMOGLOBIN-OXYGEN AFFINITY: CPT

## 2024-07-05 PROCEDURE — 82728 ASSAY OF FERRITIN: CPT | Performed by: INTERNAL MEDICINE

## 2024-07-05 RX ADMIN — HEPARIN SODIUM 5000 UNITS: 5000 INJECTION INTRAVENOUS; SUBCUTANEOUS at 09:10

## 2024-07-05 RX ADMIN — ASPIRIN 325 MG: 325 TABLET, COATED ORAL at 21:23

## 2024-07-05 RX ADMIN — METOPROLOL SUCCINATE 50 MG: 50 TABLET, EXTENDED RELEASE ORAL at 09:08

## 2024-07-05 RX ADMIN — TIMOLOL MALEATE 1 DROP: 5 SOLUTION OPHTHALMIC at 21:24

## 2024-07-05 RX ADMIN — SODIUM BICARBONATE: 84 INJECTION, SOLUTION INTRAVENOUS at 15:14

## 2024-07-05 RX ADMIN — Medication 10 ML: at 09:10

## 2024-07-05 RX ADMIN — ATORVASTATIN CALCIUM 10 MG: 10 TABLET, FILM COATED ORAL at 09:09

## 2024-07-05 RX ADMIN — HEPARIN SODIUM 5000 UNITS: 5000 INJECTION INTRAVENOUS; SUBCUTANEOUS at 21:24

## 2024-07-05 RX ADMIN — OXYBUTYNIN CHLORIDE 5 MG: 5 TABLET, EXTENDED RELEASE ORAL at 09:09

## 2024-07-05 RX ADMIN — LEVOTHYROXINE SODIUM 25 MCG: 25 TABLET ORAL at 09:10

## 2024-07-05 RX ADMIN — DOCUSATE SODIUM 50 MG AND SENNOSIDES 8.6 MG 2 TABLET: 8.6; 5 TABLET, FILM COATED ORAL at 21:23

## 2024-07-05 RX ADMIN — Medication 10 ML: at 21:24

## 2024-07-05 RX ADMIN — PANTOPRAZOLE SODIUM 40 MG: 40 TABLET, DELAYED RELEASE ORAL at 06:32

## 2024-07-05 RX ADMIN — MONTELUKAST SODIUM 10 MG: 10 TABLET, COATED ORAL at 21:23

## 2024-07-05 RX ADMIN — DOCUSATE SODIUM 50 MG AND SENNOSIDES 8.6 MG 2 TABLET: 8.6; 5 TABLET, FILM COATED ORAL at 09:09

## 2024-07-05 RX ADMIN — TIMOLOL MALEATE 1 DROP: 5 SOLUTION OPHTHALMIC at 09:11

## 2024-07-05 NOTE — CASE MANAGEMENT/SOCIAL WORK
Discharge Planning Assessment   [facility]     Patient Name: Aggie Perkins  MRN: [id number]  Today's Date: 7/5/2024    Admit Date: 7/3/2024    Plan: SS received a consult for discharge planning. SS spoke with pt at family at bedside on this date. Pt lives alone at [address] [place] [address]. PCP is Sarah Samuel. Pt does not utilize home health services at this time. Pt has used [facility] in the past and requests at discharge. Pt has a wheelchair and oxygen @ 2L at night via [employer] [facility] Pt does not have a POA/ Living will. Pt and family plan for pt to return home at discharge. Pt's family to provide transportation. SS to follow and assist with discharge planning.   Discharge Needs Assessment       Row Name 07/05/24 1413       Living Environment    People in Home alone    Current Living Arrangements home    Potentially Unsafe Housing Conditions none    Primary Care Provided by self    Provides Primary Care For no one    Family Caregiver if Needed sibling(s)    Quality of Family Relationships helpful;involved;supportive    Able to Return to Prior Arrangements yes       Resource/Environmental Concerns    Resource/Environmental Concerns none    Transportation Concerns none       Transition Planning    Patient/Family Anticipates Transition to home    Patient/Family Anticipated Services at Transition none    Transportation Anticipated family or friend will provide       Discharge Needs Assessment    Equipment Currently Used at Home wheelchair;oxygen    Concerns to be Addressed discharge planning    Anticipated Changes Related to Illness none    Equipment Needed After Discharge none    Outpatient/Agency/Support Group Needs homecare agency    Discharge Facility/Level of Care Needs home with home health                   Discharge Plan       Row Name 07/05/24 1415       Plan    Plan SS received a consult for discharge planning. SS spoke with pt at family at bedside on this date. Pt lives alone at [address] [place] [address]  [facility]. PCP is Sarah Samuel. Pt does not utilize home health services at this time. Pt has used [facility] in the past and requests at discharge. Pt has a wheelchair and oxygen @ 2L at night via [facility]. Pt does not have a POA/ Living will. Pt and family plan for pt to return home at discharge. Pt's family to provide transportation. SS to follow and assist with discharge planning.    Patient/Family in Agreement with Plan yes          Expected Discharge Date and Time       Expected Discharge Date Expected Discharge Time    Jul 6, 2024            Demographic Summary       Row Name 07/05/24 1412       General Information    Admission Type inpatient    Referral Source nursing    Reason for Consult discharge planning                CHANDLER Baron

## 2024-07-05 NOTE — PLAN OF CARE
Goal Outcome Evaluation:      Pt sitting in chair has been up in chair most of day.Ambulated in room standby assist. Will continue to monitor and follow plan of care.

## 2024-07-05 NOTE — PLAN OF CARE
Patient resting in the bed throughout the night. No s/s of distress noted. No complaints. Will continue plan of care.

## 2024-07-05 NOTE — PROGRESS NOTES
Patient Identification:  Name:  Aggie Perkins  Age:  79 y.o.  Sex:  female  :  1945  MRN:  [id number]  Visit Number:  [id number]  Primary Care Provider:  Sarah Samuel APRN    Length of stay:  2    Subjective/Interval History/Consultants/Procedures     Chief Complaint:   Chief Complaint   Patient presents with    Abnormal Lab       Subjective/Interval History:    79 y.o. female who was admitted on 7/3/2024 with acute on chronic renal failure     PMH is significant for CKD stage IV, T2DM, HTN, HLD, HFpEF, cognitive impairment   For complete admission information, please see history and physical.     Consultations:  Nephrology   PT  CM    Procedures/Scans:  CXR  CT abdomen and pelvis wo contrast     Today, the patient was seen and examined with multiple family members at bedside. She was sitting in bedside chair reporting she felt well. Family reporting patient continues to have good urine output. Patient denies abdominal or chest pain. No shortness of breath. Family did note concern about patient living at home alone though they are often there to assist they were interested in what other types of assistance may be available for the patient and case management consult placed.      Room location at the time of evaluation was 311b.    ----------------------------------------------------------------------------------------------------------------------  Current Hospital Meds:  aspirin, 325 mg, Oral, Nightly  atorvastatin, 10 mg, Oral, Daily  heparin (porcine), 5,000 Units, Subcutaneous, Q12H  insulin lispro, 2-7 Units, Subcutaneous, 4x Daily AC & at Bedtime  levothyroxine, 25 mcg, Oral, Daily  metoprolol succinate XL, 50 mg, Oral, Daily  montelukast, 10 mg, Oral, Nightly  oxybutynin XL, 5 mg, Oral, Daily  pantoprazole, 40 mg, Oral, Q AM  senna-docusate sodium, 2 tablet, Oral, BID  sodium chloride, 10 mL, Intravenous, Q12H  timolol, 1 drop, Both Eyes, BID      sodium chloride 0.45 % 1,000 mL with sodium  bicarbonate 8.4 % 50 mEq infusion, , Last Rate: 75 mL/hr at 07/05/24 1514      ----------------------------------------------------------------------------------------------------------------------      Objective     Vital Signs:  Temp:  [97.4 °F (36.3 °C)-98.7 °F (37.1 °C)] 98.2 °F (36.8 °C)  Heart Rate:  [] 88  Resp:  [20] 20  BP: (136-172)/(73-87) 169/87      07/03/24 2016 07/04/24  0500 07/05/24  0500   Weight: 66.8 kg (147 lb 4.3 oz) 66.8 kg (147 lb 4.3 oz) (New admit less than 24 hours.) 64.2 kg (141 lb 8.6 oz)     Body mass index is 30.63 kg/m².    Intake/Output Summary (Last 24 hours) at 7/5/2024 1649  Last data filed at 7/5/2024 1503  Gross per 24 hour   Intake 120 ml   Output 1 ml   Net 119 ml     I/O this shift:  In: 120 [P.O.:120]  Out: 1 [Stool:1]  Diet: Cardiac, Diabetic, Renal; Healthy Heart (2-3 Na+); Consistent Carbohydrate; Low Sodium (2-3g), Low Potassium, Low Phosphorus; Fluid Consistency: Thin (IDDSI 0)  ----------------------------------------------------------------------------------------------------------------------    Physical Exam  Vitals and nursing note reviewed.   Constitutional:       General: She is not in acute distress.     Appearance: She is obese.   HENT:      Head: Normocephalic and atraumatic.   Eyes:      Extraocular Movements: Extraocular movements intact.   Cardiovascular:      Rate and Rhythm: Normal rate and regular rhythm.   Pulmonary:      Effort: Pulmonary effort is normal.      Breath sounds: Normal breath sounds.   Abdominal:      Palpations: Abdomen is soft.   Musculoskeletal:      Right lower leg: No edema.      Left lower leg: No edema.   Skin:     General: Skin is warm and dry.   Neurological:      Mental Status: She is alert. Mental status is at baseline.   Psychiatric:         Mood and Affect: Mood normal.         Behavior: Behavior normal.               "  ----------------------------------------------------------------------------------------------------------------------  Tele:      ----------------------------------------------------------------------------------------------------------------------  Results from last 7 days   Lab Units 07/03/24  1713 07/03/24  1530   CK TOTAL U/L  --  79   HSTROP T ng/L 23* 27*   PROBNP pg/mL  --  741.8     Results from last 7 days   Lab Units 07/04/24  0117 07/03/24  1638 07/03/24  1530   CRP mg/dL  --   --  0.55*   WBC 10*3/mm3 7.87  --  6.94   HEMOGLOBIN g/dL 9.1*  --  10.3*   HEMATOCRIT % 28.4*  --  32.2*   MCV fL 97.9*  --  97.3*   MCHC g/dL 32.0  --  32.0   PLATELETS 10*3/mm3 285  --  339   INR   --  1.04  --          Results from last 7 days   Lab Units 07/05/24  0411 07/04/24  0117 07/03/24  1925 07/03/24  1530   SODIUM mmol/L 142 139 137 138   POTASSIUM mmol/L 5.0 5.1 5.1 6.6*   MAGNESIUM mg/dL  --   --   --  2.7*   CHLORIDE mmol/L 110* 105 101 99   CO2 mmol/L 20.5* 22.6 22.0 24.6   BUN mg/dL 56* 71* 72* 78*   CREATININE mg/dL 4.70* 5.01* 4.78* 5.28*   CALCIUM mg/dL 8.3* 7.4* 8.0* 8.7   GLUCOSE mg/dL 99 133* 80 89   ALBUMIN g/dL 3.3* 3.1* 3.6 3.9   BILIRUBIN mg/dL  --  0.2 0.3 0.3   ALK PHOS U/L  --  59 70 72   AST (SGOT) U/L  --  11 12 11   ALT (SGPT) U/L  --  <5 5 5   Estimated Creatinine Clearance: 8.4 mL/min (A) (by C-G formula based on SCr of 4.7 mg/dL (H)).  No results found for: \"AMMONIA\"      No results found for: \"BLOODCX\"  No results found for: \"URINECX\"  No results found for: \"WOUNDCX\"  No results found for: \"STOOLCX\"  ----------------------------------------------------------------------------------------------------------------------  Imaging Results (Last 24 Hours)       ** No results found for the last 24 hours. **          ----------------------------------------------------------------------------------------------------------------------   I have reviewed the above laboratory values for " 07/05/24    Assessment/Plan     Active Hospital Problems    Diagnosis  POA    **Acute on chronic renal failure [N17.9, N18.9]  Yes         ASSESSMENT/PLAN:    BO on CKD stage IV  Hyperkalemia, resolved  Patient with fairly recently diagnosed CKD-stage IV with most recent creatinine 2.47 was sent to the ED after abnormal outpatient labs. Lab work in the ED showed creatinine 5.28 with BUN 78 and potassium 6.6.  CT of the abdomen and pelvis did not show any obstructive process.  Nephrology consulted for further assistance and recommendations, appreciated.  Concerned patient is going to require dialysis though no definitive need to initiate currently.   Creatinine 4.7 today  Transitioned to half normal saline with bicarb today to continue at 75 mL/h  Potassium further improved to 5.0. Continue to monitor and correct electrolyte abnormalities as indicated  Avoid nephrotoxins. Monitor Is/Os  Repeat BMP in the AM     T2DM  A1c 5.6  Holding oral medications  Covering with SSI. Monitor and titrate as needed.  Currently well controlled     Hypertension  Captopril, torsemide held given BO. Metoprolol continued. PRN hydralazine available  BP beginning to trend upward     Diastolic CHF, chronic, compensated  Holding torsemide and continuing metoprolol as above   Continue to monitor clinical volume status closely     HLD  Statin     Hypothyroidism  levothyroxine      -----------  -DVT prophylaxis: Subcu heparin   -Disposition plans/anticipated needs: pending clinical course. CM assisting with DC planning. Patient will benefit from HH once medically stable        The patient is high risk due to the following diagnoses/reasons:  acute on chronic renal failure        Ti Sewell PA-C  07/05/24  16:49 EDT

## 2024-07-05 NOTE — PROGRESS NOTES
Nephrology Progress Note    Interval History:     Patient Complaints: No new complaints.  No shortness of breath.  Mild anorexia but no nausea or vomiting.        Vital Signs  Temp:  [97.4 °F (36.3 °C)-98.7 °F (37.1 °C)] 98.7 °F (37.1 °C)  Heart Rate:  [] 83  Resp:  [20] 20  BP: (136-172)/(73-82) 172/82    Physical Exam:    General:           No distress      HEENT: No pallor               Neck: No JVD       Lungs:   Clear   Heart:  Regular,  no rub       Abdomen:   Normal bowel sounds, soft non-tender, non-distended, no guarding       Extremities: No edema               Neurologic: Cranial nerves grossly intact, no myoclonus, up in chair with sister and brother-in-law and niece at bedside        Results Review:    I reviewed the patient's new clinical results.    Lab Results (last 24 hours)       Procedure Component Value Units Date/Time    POC Glucose Once [[id number]]  (Normal) Collected: 07/05/24 1125    Specimen: Blood Updated: 07/05/24 1132     Glucose 96 mg/dL     POC Glucose Once [[id number]]  (Normal) Collected: 07/05/24 0714    Specimen: Blood Updated: 07/05/24 0720     Glucose 93 mg/dL     Ferritin [[id number]]  (Normal) Collected: 07/05/24 0411    Specimen: Blood Updated: 07/05/24 0458     Ferritin 77.27 ng/mL     Narrative:      Results may be falsely decreased if patient taking Biotin.      Iron Profile [[id number]]  (Abnormal) Collected: 07/05/24 0411    Specimen: Blood Updated: 07/05/24 0448     Iron 58 mcg/dL      Iron Saturation (TSAT) 20 %      Transferrin 198 mg/dL      TIBC 295 mcg/dL     Renal Function Panel [[id number]]  (Abnormal) Collected: 07/05/24 0411    Specimen: Blood Updated: 07/05/24 0448     Glucose 99 mg/dL      BUN 56 mg/dL      Creatinine 4.70 mg/dL      Sodium 142 mmol/L      Potassium 5.0 mmol/L      Comment: Slight hemolysis detected by analyzer. Result may be falsely elevated.        Chloride 110 mmol/L      CO2  20.5 mmol/L      Calcium 8.3 mg/dL      Albumin 3.3 g/dL      Phosphorus 4.6 mg/dL      Anion Gap 11.5 mmol/L      BUN/Creatinine Ratio 11.9     eGFR 9.1 mL/min/1.73      Comment: <15 Indicative of kidney failure       Narrative:      GFR Normal >60  Chronic Kidney Disease <60  Kidney Failure <15    The GFR formula is only valid for adults with stable renal function between ages 18 and 70.    Blood Gas, Venous With Co-Ox [[id number]]  (Abnormal) Collected: 07/05/24 0416    Specimen: Venous Blood Updated: 07/05/24 0419     Site Lab     pH, Venous 7.352 pH Units      pCO2, Venous 41.0 mm Hg      pO2, Venous 24.8 mm Hg      Comment: 84 Value below reference range        HCO3, Venous 22.7 mmol/L      Base Excess, Venous -2.7 mmol/L      O2 Saturation, Venous 47.9 %      Hemoglobin, Blood Gas 10.0 g/dL      Comment: 84 Value below reference range        CO2 Content 24.0 mmol/L      Barometric Pressure for Blood Gas 724 mmHg      Modality Room Air     FIO2 21 %      Ventilator Mode NA     Collected by [id number]     Comment: Meter: [id number]     :  [id number]        Oxyhemoglobin Venous 47.0 %      Carboxyhemoglobin Venous 1.5 %      Methemoglobin Venous 0.4 %     POC Glucose Once [[id number]]  (Abnormal) Collected: 07/04/24 1920    Specimen: Blood Updated: 07/04/24 1926     Glucose 157 mg/dL     POC Glucose Once [[id number]]  (Normal) Collected: 07/04/24 1601    Specimen: Blood Updated: 07/04/24 1608     Glucose 102 mg/dL             Imaging Results (Last 24 Hours)       ** No results found for the last 24 hours. **            Assessment and Plan:      1.  Acute renal failure on CKD stage IV  2.  Severe hyperkalemia with neuro paralytic symptoms now better  3.  Diastolic dysfunction with preserved left ventricular ejection fraction  4.  Type 2 diabetes with renal involvement  5.  Dyslipidemia  6.  Pulmonary hypertension  7.  Hypothyroidism  8.  Proteinuria with albumin creatinine ratio of 154 mg/g and protein  creatinine ratio of 385 mg/g on 3/21/2024  9.  Nonobstructing left renal calculus with no hydronephrosis  10.  Compensated metabolic acidosis    Will change IV fluid from 0.9 normal saline to half-normal saline with 50 mEq of bicarb  Will give at 75 mL/h  The creatinine is a little better  At least it is not worse  She is anxious to go home but I have explained to her and the family that it is better to give it a couple of days and see if the kidney improves further  Technically she is eligible for dialysis but biochemically she is not hyperkalemic anymore and her acidemia is not severe and she is not short of breath or uremic so it is reasonable to wait  Will follow labs    Alexandr Daniel MD  07/05/24  12:59 EDT

## 2024-07-06 LAB
ANION GAP SERPL CALCULATED.3IONS-SCNC: 12.1 MMOL/L (ref 5–15)
BUN SERPL-MCNC: 51 MG/DL (ref 8–23)
BUN/CREAT SERPL: 12.3 (ref 7–25)
CALCIUM SPEC-SCNC: 8.6 MG/DL (ref 8.6–10.5)
CHLORIDE SERPL-SCNC: 108 MMOL/L (ref 98–107)
CO2 SERPL-SCNC: 19.9 MMOL/L (ref 22–29)
CREAT SERPL-MCNC: 4.13 MG/DL (ref 0.57–1)
EGFRCR SERPLBLD CKD-EPI 2021: 10.6 ML/MIN/1.73
GLUCOSE BLDC GLUCOMTR-MCNC: 100 MG/DL (ref 70–130)
GLUCOSE BLDC GLUCOMTR-MCNC: 238 MG/DL (ref 70–130)
GLUCOSE BLDC GLUCOMTR-MCNC: 254 MG/DL (ref 70–130)
GLUCOSE BLDC GLUCOMTR-MCNC: 265 MG/DL (ref 70–130)
GLUCOSE SERPL-MCNC: 104 MG/DL (ref 65–99)
POTASSIUM SERPL-SCNC: 4.6 MMOL/L (ref 3.5–5.2)
SODIUM SERPL-SCNC: 140 MMOL/L (ref 136–145)

## 2024-07-06 PROCEDURE — 80048 BASIC METABOLIC PNL TOTAL CA: CPT

## 2024-07-06 PROCEDURE — 82948 REAGENT STRIP/BLOOD GLUCOSE: CPT

## 2024-07-06 PROCEDURE — 99231 SBSQ HOSP IP/OBS SF/LOW 25: CPT | Performed by: STUDENT IN AN ORGANIZED HEALTH CARE EDUCATION/TRAINING PROGRAM

## 2024-07-06 PROCEDURE — 63710000001 INSULIN LISPRO (HUMAN) PER 5 UNITS: Performed by: HOSPITALIST

## 2024-07-06 PROCEDURE — 25010000002 HEPARIN (PORCINE) PER 1000 UNITS: Performed by: HOSPITALIST

## 2024-07-06 RX ADMIN — INSULIN LISPRO 3 UNITS: 100 INJECTION, SOLUTION INTRAVENOUS; SUBCUTANEOUS at 20:41

## 2024-07-06 RX ADMIN — LEVOTHYROXINE SODIUM 25 MCG: 25 TABLET ORAL at 08:44

## 2024-07-06 RX ADMIN — Medication 10 ML: at 20:42

## 2024-07-06 RX ADMIN — TIMOLOL MALEATE 1 DROP: 5 SOLUTION OPHTHALMIC at 20:42

## 2024-07-06 RX ADMIN — ASPIRIN 325 MG: 325 TABLET, COATED ORAL at 20:41

## 2024-07-06 RX ADMIN — TIMOLOL MALEATE 1 DROP: 5 SOLUTION OPHTHALMIC at 09:11

## 2024-07-06 RX ADMIN — HEPARIN SODIUM 5000 UNITS: 5000 INJECTION INTRAVENOUS; SUBCUTANEOUS at 20:41

## 2024-07-06 RX ADMIN — ATORVASTATIN CALCIUM 10 MG: 10 TABLET, FILM COATED ORAL at 08:44

## 2024-07-06 RX ADMIN — DOCUSATE SODIUM 50 MG AND SENNOSIDES 8.6 MG 2 TABLET: 8.6; 5 TABLET, FILM COATED ORAL at 08:44

## 2024-07-06 RX ADMIN — OXYBUTYNIN CHLORIDE 5 MG: 5 TABLET, EXTENDED RELEASE ORAL at 08:44

## 2024-07-06 RX ADMIN — MONTELUKAST SODIUM 10 MG: 10 TABLET, COATED ORAL at 20:41

## 2024-07-06 RX ADMIN — METOPROLOL SUCCINATE 50 MG: 50 TABLET, EXTENDED RELEASE ORAL at 08:44

## 2024-07-06 RX ADMIN — INSULIN LISPRO 4 UNITS: 100 INJECTION, SOLUTION INTRAVENOUS; SUBCUTANEOUS at 11:12

## 2024-07-06 RX ADMIN — INSULIN LISPRO 4 UNITS: 100 INJECTION, SOLUTION INTRAVENOUS; SUBCUTANEOUS at 08:44

## 2024-07-06 RX ADMIN — HEPARIN SODIUM 5000 UNITS: 5000 INJECTION INTRAVENOUS; SUBCUTANEOUS at 08:44

## 2024-07-06 RX ADMIN — SODIUM BICARBONATE: 84 INJECTION, SOLUTION INTRAVENOUS at 12:50

## 2024-07-06 NOTE — PLAN OF CARE
Goal Outcome Evaluation:  Pt resting in bed, No complaints. No acute signs of distress. Will continue to follow plan of care.

## 2024-07-06 NOTE — PLAN OF CARE
Goal Outcome Evaluation:  Plan of Care Reviewed With: patient           Outcome Evaluation: patient has done well today has been up walking to bathroom with standby assist several times today vss has not voiced any complaints no acute changes will continue plan of care

## 2024-07-06 NOTE — PROGRESS NOTES
Nephrology Progress Note    Interval History:     Patient Complaints: She continues to improve slowly.  She denies shortness of breath or swelling of the feet.  She says her anorexia and nausea is better and she is actually eating better.  Good urine output she says.        Vital Signs  Temp:  [97.6 °F (36.4 °C)-98.2 °F (36.8 °C)] 98.2 °F (36.8 °C)  Heart Rate:  [] 75  Resp:  [20] 20  BP: (142-177)/(66-90) 158/70    Physical Exam:    General:                   No distress        HEENT: No pallor                     Neck: No JVD         Lungs:   Clear   Heart:  Regular,  no rub                 Extremities: No edema                     Neurologic: Awake and alert and appropriate        Results Review:    I reviewed the patient's new clinical results.    Lab Results (last 24 hours)       Procedure Component Value Units Date/Time    POC Glucose Once [[id number]]  (Abnormal) Collected: 07/06/24 1017    Specimen: Blood Updated: 07/06/24 1022     Glucose 265 mg/dL     POC Glucose Once [[id number]]  (Abnormal) Collected: 07/06/24 0643    Specimen: Blood Updated: 07/06/24 0649     Glucose 254 mg/dL     Basic Metabolic Panel [[id number]]  (Abnormal) Collected: 07/06/24 0040    Specimen: Blood Updated: 07/06/24 0132     Glucose 104 mg/dL      BUN 51 mg/dL      Creatinine 4.13 mg/dL      Sodium 140 mmol/L      Potassium 4.6 mmol/L      Chloride 108 mmol/L      CO2 19.9 mmol/L      Calcium 8.6 mg/dL      BUN/Creatinine Ratio 12.3     Anion Gap 12.1 mmol/L      eGFR 10.6 mL/min/1.73      Comment: <15 Indicative of kidney failure       Narrative:      GFR Normal >60  Chronic Kidney Disease <60  Kidney Failure <15    The GFR formula is only valid for adults with stable renal function between ages 18 and 70.    POC Glucose Once [[id number]]  (Normal) Collected: 07/05/24 1630    Specimen: Blood Updated: 07/05/24 1636     Glucose 111 mg/dL             Imaging Results  (Last 24 Hours)       ** No results found for the last 24 hours. **            Assessment and Plan:    1.  Acute renal failure on CKD stage IV  2.  Severe hyperkalemia with neuro paralytic symptoms now better  3.  Diastolic dysfunction with preserved left ventricular ejection fraction  4.  Type 2 diabetes with renal involvement  5.  Dyslipidemia  6.  Pulmonary hypertension  7.  Hypothyroidism  8.  Proteinuria with albumin creatinine ratio of 154 mg/g and protein creatinine ratio of 385 mg/g on 3/21/2024  9.  Nonobstructing left renal calculus with no hydronephrosis  10.  Compensated metabolic acidosis    The potassium remains good with no further intervention    The acute renal failure appears to be slowly improving with a creatinine now down to 4.16 mg/dL.  Family is very keen to take her home but have encouraged him to stay as she is symptomatically better and biochemically better and we do not know whether her creatinine is going to go back to baseline of March 2024.  But we certainly need to give her a chance is a current GFR is low at 10.6 mL/min and she remains at very high risk for dialysis    For her acidosis we will continue the bicarbonate drip and check a venous gas in the morning.    Alexandr Daniel MD  07/06/24  12:58 EDT

## 2024-07-07 ENCOUNTER — APPOINTMENT (OUTPATIENT)
Dept: GENERAL RADIOLOGY | Facility: HOSPITAL | Age: 79
End: 2024-07-07
Payer: MEDICARE

## 2024-07-07 LAB
ALBUMIN SERPL-MCNC: 3.2 G/DL (ref 3.5–5.2)
ANION GAP SERPL CALCULATED.3IONS-SCNC: 9.2 MMOL/L (ref 5–15)
ATMOSPHERIC PRESS: 727 MMHG
BASE EXCESS BLDV CALC-SCNC: -3 MMOL/L (ref 0–2)
BDY SITE: ABNORMAL
BUN SERPL-MCNC: 55 MG/DL (ref 8–23)
BUN/CREAT SERPL: 16.2 (ref 7–25)
CALCIUM SPEC-SCNC: 8.7 MG/DL (ref 8.6–10.5)
CHLORIDE SERPL-SCNC: 109 MMOL/L (ref 98–107)
CO2 BLDA-SCNC: 24.3 MMOL/L (ref 22–33)
CO2 SERPL-SCNC: 19.8 MMOL/L (ref 22–29)
COHGB MFR BLD: 1.5 % (ref 0–5)
CREAT SERPL-MCNC: 3.39 MG/DL (ref 0.57–1)
EGFRCR SERPLBLD CKD-EPI 2021: 13.4 ML/MIN/1.73
GLUCOSE BLDC GLUCOMTR-MCNC: 114 MG/DL (ref 70–130)
GLUCOSE BLDC GLUCOMTR-MCNC: 117 MG/DL (ref 70–130)
GLUCOSE BLDC GLUCOMTR-MCNC: 166 MG/DL (ref 70–130)
GLUCOSE BLDC GLUCOMTR-MCNC: 171 MG/DL (ref 70–130)
GLUCOSE SERPL-MCNC: 91 MG/DL (ref 65–99)
HCO3 BLDV-SCNC: 22.9 MMOL/L (ref 22–28)
HGB BLDA-MCNC: 9.7 G/DL (ref 13.5–17.5)
INHALED O2 CONCENTRATION: 21 %
Lab: ABNORMAL
METHGB BLD QL: 0 % (ref 0–3)
MODALITY: ABNORMAL
OXYHGB MFR BLDV: 97.4 % (ref 45–75)
PCO2 BLDV: 43.8 MM HG (ref 41–51)
PH BLDV: 7.33 PH UNITS (ref 7.32–7.42)
PHOSPHATE SERPL-MCNC: 4.8 MG/DL (ref 2.5–4.5)
PO2 BLDV: 107 MM HG (ref 27–53)
POTASSIUM SERPL-SCNC: 4.9 MMOL/L (ref 3.5–5.2)
SAO2 % BLDCOV: 98.9 % (ref 45–75)
SODIUM SERPL-SCNC: 138 MMOL/L (ref 136–145)
VENTILATOR MODE: ABNORMAL

## 2024-07-07 PROCEDURE — 71045 X-RAY EXAM CHEST 1 VIEW: CPT

## 2024-07-07 PROCEDURE — 63710000001 INSULIN LISPRO (HUMAN) PER 5 UNITS: Performed by: HOSPITALIST

## 2024-07-07 PROCEDURE — 25010000002 HEPARIN (PORCINE) PER 1000 UNITS: Performed by: HOSPITALIST

## 2024-07-07 PROCEDURE — 94664 DEMO&/EVAL PT USE INHALER: CPT

## 2024-07-07 PROCEDURE — 80069 RENAL FUNCTION PANEL: CPT | Performed by: INTERNAL MEDICINE

## 2024-07-07 PROCEDURE — 94761 N-INVAS EAR/PLS OXIMETRY MLT: CPT

## 2024-07-07 PROCEDURE — 82948 REAGENT STRIP/BLOOD GLUCOSE: CPT

## 2024-07-07 PROCEDURE — 99231 SBSQ HOSP IP/OBS SF/LOW 25: CPT | Performed by: STUDENT IN AN ORGANIZED HEALTH CARE EDUCATION/TRAINING PROGRAM

## 2024-07-07 PROCEDURE — 82820 HEMOGLOBIN-OXYGEN AFFINITY: CPT

## 2024-07-07 PROCEDURE — 94799 UNLISTED PULMONARY SVC/PX: CPT

## 2024-07-07 PROCEDURE — 82805 BLOOD GASES W/O2 SATURATION: CPT

## 2024-07-07 PROCEDURE — 71045 X-RAY EXAM CHEST 1 VIEW: CPT | Performed by: RADIOLOGY

## 2024-07-07 RX ORDER — IPRATROPIUM BROMIDE AND ALBUTEROL SULFATE 2.5; .5 MG/3ML; MG/3ML
3 SOLUTION RESPIRATORY (INHALATION)
Status: DISCONTINUED | OUTPATIENT
Start: 2024-07-07 | End: 2024-07-08

## 2024-07-07 RX ADMIN — PANTOPRAZOLE SODIUM 40 MG: 40 TABLET, DELAYED RELEASE ORAL at 05:26

## 2024-07-07 RX ADMIN — SODIUM BICARBONATE: 84 INJECTION, SOLUTION INTRAVENOUS at 07:59

## 2024-07-07 RX ADMIN — ATORVASTATIN CALCIUM 10 MG: 10 TABLET, FILM COATED ORAL at 10:08

## 2024-07-07 RX ADMIN — ASPIRIN 325 MG: 325 TABLET, COATED ORAL at 20:36

## 2024-07-07 RX ADMIN — TIMOLOL MALEATE 1 DROP: 5 SOLUTION OPHTHALMIC at 10:08

## 2024-07-07 RX ADMIN — HEPARIN SODIUM 5000 UNITS: 5000 INJECTION INTRAVENOUS; SUBCUTANEOUS at 10:06

## 2024-07-07 RX ADMIN — OXYBUTYNIN CHLORIDE 5 MG: 5 TABLET, EXTENDED RELEASE ORAL at 10:08

## 2024-07-07 RX ADMIN — Medication 10 ML: at 20:42

## 2024-07-07 RX ADMIN — INSULIN LISPRO 2 UNITS: 100 INJECTION, SOLUTION INTRAVENOUS; SUBCUTANEOUS at 12:26

## 2024-07-07 RX ADMIN — METOPROLOL SUCCINATE 50 MG: 50 TABLET, EXTENDED RELEASE ORAL at 10:06

## 2024-07-07 RX ADMIN — HEPARIN SODIUM 5000 UNITS: 5000 INJECTION INTRAVENOUS; SUBCUTANEOUS at 20:36

## 2024-07-07 RX ADMIN — MONTELUKAST SODIUM 10 MG: 10 TABLET, COATED ORAL at 20:37

## 2024-07-07 RX ADMIN — TIMOLOL MALEATE 1 DROP: 5 SOLUTION OPHTHALMIC at 20:38

## 2024-07-07 RX ADMIN — Medication 10 ML: at 08:07

## 2024-07-07 RX ADMIN — INSULIN LISPRO 2 UNITS: 100 INJECTION, SOLUTION INTRAVENOUS; SUBCUTANEOUS at 20:37

## 2024-07-07 RX ADMIN — LEVOTHYROXINE SODIUM 25 MCG: 25 TABLET ORAL at 10:08

## 2024-07-07 NOTE — PLAN OF CARE
Goal Outcome Evaluation:      Patient resting this shift. No s/s of acute distress noted at this time. Plan of care ongoing.

## 2024-07-07 NOTE — PROGRESS NOTES
[facility] HOSPITALIST PROGRESS NOTE     Patient Identification:  Name:  Aggie Perkins  Age:  79 y.o.  Sex:  female  :  1945  MRN:  [id number]  Visit Number:  [id number]  ROOM: [address] [place] [address]     Primary Care Provider:  Sarah Samuel APRN    Length of stay in inpatient status:  3    Subjective     Chief Compliant:    Chief Complaint   Patient presents with    Abnormal Lab       History of Presenting Illness:    Patient seen and examined today with her sister and niece at bedside.  Patient denies shortness of breath.    Objective     Current Hospital Meds:aspirin, 325 mg, Oral, Nightly  atorvastatin, 10 mg, Oral, Daily  heparin (porcine), 5,000 Units, Subcutaneous, Q12H  insulin lispro, 2-7 Units, Subcutaneous, 4x Daily AC & at Bedtime  levothyroxine, 25 mcg, Oral, Daily  metoprolol succinate XL, 50 mg, Oral, Daily  montelukast, 10 mg, Oral, Nightly  oxybutynin XL, 5 mg, Oral, Daily  pantoprazole, 40 mg, Oral, Q AM  senna-docusate sodium, 2 tablet, Oral, BID  sodium chloride, 10 mL, Intravenous, Q12H  timolol, 1 drop, Both Eyes, BID    sodium chloride 0.45 % 1,000 mL with sodium bicarbonate 8.4 % 50 mEq infusion, , Last Rate: 75 mL/hr at 24 1250        Current Antimicrobial Therapy:  Anti-Infectives (From admission, onward)      None          Current Diuretic Therapy:  Diuretics (From admission, onward)      None          ----------------------------------------------------------------------------------------------------------------------  Vital Signs:  Temp:  [97.6 °F (36.4 °C)-98.5 °F (36.9 °C)] 98.2 °F (36.8 °C)  Heart Rate:  [65-89] 89  Resp:  [20] 20  BP: (124-177)/(62-91) 124/62  SpO2:  [96 %-98 %] 97 %  on  Flow (L/min):  [2] 2;   Device (Oxygen Therapy): room air  Body mass index is 30.04 kg/m².    Wt Readings from Last 3 Encounters:   24 63 kg (138 lb 12.8 oz)   24 66.4 kg (146 lb 6.4 oz)   10/25/23 67.4 kg (148 lb 9.6 oz)     Intake & Output (last 3 days)           0701 07/05 0700 07/05 0701 07/06 0700 07/06 0701  07/07 0700    P.O.     I.V. (mL/kg)   1000 (15.9)    IV Piggyback       Total Intake(mL/kg) 240 (3.7) 230 (3.7) 2050 (32.5)    Urine (mL/kg/hr)  0 (0)     Stool  1     Total Output  1     Net +240 +229 +2050           Urine Unmeasured Occurrence 8 x 6 x 2 x    Stool Unmeasured Occurrence 1 x  1 x          Diet: Cardiac, Diabetic, Renal; Healthy Heart (2-3 Na+); Consistent Carbohydrate; Low Sodium (2-3g), Low Potassium, Low Phosphorus; Fluid Consistency: Thin (IDDSI 0)  ----------------------------------------------------------------------------------------------------------------------  Physical exam:   Constitutional:  Well-developed and well-nourished.  No acute distress.      HENT:  Head:  Normocephalic and atraumatic.    Cardiovascular:  Normal rate, regular rhythm, no murmur  Pulmonary/Chest:  No respiratory distress, speaks in complete sentences. Bibasilar crackles present.  Musculoskeletal:  No deformity.      Neurological: Awake, alert, no focal deficit on gross examination. No slurred speech or facial droop.   Skin:  Skin is warm and dry.   Peripheral vascular:  No cyanosis, no extremity edema.  Psychiatric: Appropriate mood and affect      Edited by: Ani Collado DO at 7/6/2024 2010  ----------------------------------------------------------------------------------------------------------------------  Results from last 7 days   Lab Units 07/04/24  0117 07/03/24  1638 07/03/24  1530   CRP mg/dL  --   --  0.55*   WBC 10*3/mm3 7.87  --  6.94   HEMOGLOBIN g/dL 9.1*  --  10.3*   HEMATOCRIT % 28.4*  --  32.2*   MCV fL 97.9*  --  97.3*   MCHC g/dL 32.0  --  32.0   PLATELETS 10*3/mm3 285  --  339   INR   --  1.04  --          Results from last 7 days   Lab Units 07/06/24  0040 07/05/24  0411 07/04/24  0117 07/03/24  1925 07/03/24  1530   SODIUM mmol/L 140 142 139 137 138   POTASSIUM mmol/L 4.6 5.0 5.1 5.1 6.6*   MAGNESIUM mg/dL  --   --   --   --   "2.7*   CHLORIDE mmol/L 108* 110* 105 101 99   CO2 mmol/L 19.9* 20.5* 22.6 22.0 24.6   BUN mg/dL 51* 56* 71* 72* 78*   CREATININE mg/dL 4.13* 4.70* 5.01* 4.78* 5.28*   CALCIUM mg/dL 8.6 8.3* 7.4* 8.0* 8.7   PHOSPHORUS mg/dL  --  4.6*  --   --   --    GLUCOSE mg/dL 104* 99 133* 80 89   ALBUMIN g/dL  --  3.3* 3.1* 3.6 3.9   BILIRUBIN mg/dL  --   --  0.2 0.3 0.3   ALK PHOS U/L  --   --  59 70 72   AST (SGOT) U/L  --   --  11 12 11   ALT (SGPT) U/L  --   --  <5 5 5   Estimated Creatinine Clearance: 9.5 mL/min (A) (by C-G formula based on SCr of 4.13 mg/dL (H)).  No results found for: \"AMMONIA\"  Results from last 7 days   Lab Units 07/03/24  1713 07/03/24  1530   CK TOTAL U/L  --  79   HSTROP T ng/L 23* 27*     Results from last 7 days   Lab Units 07/03/24  1530   PROBNP pg/mL 741.8         Glucose   Date/Time Value Ref Range Status   07/06/2024 1855 238 (H) 70 - 130 mg/dL Final   07/06/2024 1602 100 70 - 130 mg/dL Final   07/06/2024 1017 265 (H) 70 - 130 mg/dL Final   07/06/2024 0643 254 (H) 70 - 130 mg/dL Final   07/05/2024 1630 111 70 - 130 mg/dL Final   07/05/2024 1125 96 70 - 130 mg/dL Final   07/05/2024 0714 93 70 - 130 mg/dL Final   07/04/2024 1920 157 (H) 70 - 130 mg/dL Final     Lab Results   Component Value Date    TSH 1.930 07/03/2024    FREET4 1.57 07/03/2024     No results found for: \"PREGTESTUR\", \"PREGSERUM\", \"HCG\", \"HCGQUANT\"  Pain Management Panel  More data may exist         Latest Ref Rng & Units 7/3/2024 3/21/2024   Pain Management Panel   Creatinine, Urine mg/dL 40.4  74.0  74.0    Amphetamine, Urine Qual Negative Negative  -   Barbiturates Screen, Urine Negative Negative  -   Benzodiazepine Screen, Urine Negative Negative  -   Buprenorphine, Screen, Urine Negative Negative  -   Cocaine Screen, Urine Negative Negative  -   Fentanyl, Urine Negative Negative  -   Methadone Screen , Urine Negative Negative  -   Methamphetamine, Ur Negative Negative  -     Brief Urine Lab Results  (Last result in the past " "365 days)        Color   Clarity   Blood   Leuk Est   Nitrite   Protein   CREAT   Urine HCG        07/03/24 1637             40.4         07/03/24 1637 Yellow   Clear   Moderate (2+)   Trace   Negative   100 mg/dL (2+)                 No results found for: \"BLOODCX\"  No results found for: \"URINECX\"  No results found for: \"WOUNDCX\"  No results found for: \"STOOLCX\"  No results found for: \"RESPCX\"  No results found for: \"AFBCX\"  Results from last 7 days   Lab Units 07/03/24  1530   SED RATE mm/hr 37*   CRP mg/dL 0.55*       I have personally looked at the labs and they are summarized above.  ----------------------------------------------------------------------------------------------------------------------  Detailed radiology reports for the last 24 hours:  Imaging Results (Last 24 Hours)       ** No results found for the last 24 hours. **          Assessment & Plan      #BO on CKD, improving  #Hyperkalemia, resolved  #Type II DM  #Hypertension  #CHFpEF  #Hyperlipidemia   #Hypothyroidism     -Renal function is improving.  IV fluids are being continued by nephrology.  Repeat BMP in AM.  -Patient seems to be developing signs of mild volume overload as she had bibasilar crackles on lung exam this afternoon. No increased O2 requirement. Monitor intake and output.  -She remains high risk for needing dialysis  -Monitor on telemetry  Edited by: Ani Collado DO at 7/6/2024 2010    Active VTE Prophylaxis:  Pharmacologic:        Start     Dose Route Frequency Stop    07/03/24 2130  heparin (porcine) 5000 UNIT/ML injection 5,000 Units         5,000 Units SC Every 12 Hours Scheduled --                    Dispo: Pending clinical progress    Ani Collado DO  [facility]  07/06/24  20:10 EDT   "

## 2024-07-07 NOTE — PLAN OF CARE
Goal Outcome Evaluation:           Progress: improving       Pt resting in bed at this time, pt is A/O X4 and on RA. No complaints or distress noted. VSS afebrile. Plan of care ongoing.

## 2024-07-07 NOTE — PROGRESS NOTES
Nephrology Progress Note    Interval History:     Patient Complaints: Feeling better.  Eating better.  No nausea.  No shortness of breath.  Lying comfortably supine in bed with no orthopnea.        Vital Signs  Temp:  [97.4 °F (36.3 °C)-98.7 °F (37.1 °C)] 98.3 °F (36.8 °C)  Heart Rate:  [67-89] 67  Resp:  [18-20] 18  BP: (124-175)/(62-91) 144/69    Physical Exam:  General:                   No distress        HEENT: No pallor                     Neck: No JVD         Lungs:   Clear   Heart:  Regular,  no rub                     Extremities: No edema                     Neurologic: Awake and alert and appropriate        Results Review:    I reviewed the patient's new clinical results.    Lab Results (last 24 hours)       Procedure Component Value Units Date/Time    POC Glucose Once [[id number]]  (Abnormal) Collected: 07/07/24 1133    Specimen: Blood Updated: 07/07/24 1149     Glucose 166 mg/dL     POC Glucose Once [[id number]]  (Normal) Collected: 07/07/24 0713    Specimen: Blood Updated: 07/07/24 0729     Glucose 114 mg/dL     Renal Function Panel [[id number]]  (Abnormal) Collected: 07/07/24 0408    Specimen: Blood Updated: 07/07/24 0506     Glucose 91 mg/dL      BUN 55 mg/dL      Creatinine 3.39 mg/dL      Sodium 138 mmol/L      Potassium 4.9 mmol/L      Chloride 109 mmol/L      CO2 19.8 mmol/L      Calcium 8.7 mg/dL      Albumin 3.2 g/dL      Phosphorus 4.8 mg/dL      Anion Gap 9.2 mmol/L      BUN/Creatinine Ratio 16.2     eGFR 13.4 mL/min/1.73      Comment: <15 Indicative of kidney failure       Narrative:      GFR Normal >60  Chronic Kidney Disease <60  Kidney Failure <15    The GFR formula is only valid for adults with stable renal function between ages 18 and 70.    Blood Gas, Venous With Co-Ox [[id number]]  (Abnormal) Collected: 07/07/24 0419    Specimen: Venous Blood Updated: 07/07/24 0423     Site Lab     pH, Venous 7.327 pH Units      pCO2, Venous  43.8 mm Hg      pO2, Venous 107.0 mm Hg      Comment: 83 Value above reference range        HCO3, Venous 22.9 mmol/L      Base Excess, Venous -3.0 mmol/L      O2 Saturation, Venous 98.9 %      Comment: 83 Value above reference range        Hemoglobin, Blood Gas 9.7 g/dL      Comment: 84 Value below reference range        CO2 Content 24.3 mmol/L      Barometric Pressure for Blood Gas 727 mmHg      Modality Room Air     FIO2 21 %      Ventilator Mode NA     Collected by [id number]     Comment: Meter: [id number]     :  [id number]        Oxyhemoglobin Venous 97.4 %      Comment: 83 Value above reference range        Carboxyhemoglobin Venous 1.5 %      Methemoglobin Venous 0.0 %     POC Glucose Once [[id number]]  (Abnormal) Collected: 07/06/24 1855    Specimen: Blood Updated: 07/06/24 1901     Glucose 238 mg/dL     POC Glucose Once [[id number]]  (Normal) Collected: 07/06/24 1602    Specimen: Blood Updated: 07/06/24 1608     Glucose 100 mg/dL             Imaging Results (Last 24 Hours)       Procedure Component Value Units Date/Time    XR Chest 1 View [[id number]] Collected: 07/07/24 0624     Updated: 07/07/24 0627    Narrative:      PROCEDURE: Portable chest x-ray examination performed on July 7, 2024.  Single view. Supine position.     HISTORY: Shortness of breath.     COMPARISON: None.     FINDINGS:     Heart size at the upper limits of normal.  No lobar consolidation or edema.   No pleural effusion or pneumothorax.  No fracture or foreign body.       Impression:         1.  Heart size at the upper limits of normal.  2.  No lobar consolidation or edema.  3.  No pleural effusion or pneumothorax.  4.  No free air in the upper abdomen.  5.  Slightly elevated right hemidiaphragm.     This report was finalized on 7/7/2024 6:25 AM by Amandeep Saleh MD.               Assessment and Plan:      1.  Acute renal failure on CKD stage IV  2.  Severe hyperkalemia with neuro paralytic symptoms now better  3.  Diastolic  dysfunction with preserved left ventricular ejection fraction  4.  Type 2 diabetes with renal involvement  5.  Dyslipidemia  6.  Pulmonary hypertension  7.  Hypothyroidism  8.  Proteinuria with albumin creatinine ratio of 154 mg/g and protein creatinine ratio of 385 mg/g on 3/21/2024  9.  Nonobstructing left renal calculus with no hydronephrosis  10.  metabolic acidosis  11.  Respiratory acidosis    Her acute renal failure is steadily improving.    Her uremic symptoms are certainly better.  She has no evidence of fluid overload clinically or radiologically on her chest x-ray, the images of which have personally reviewed. continue IV fluids but will reduce the rate to 50 mL/h as she is eating and drinking better.      She still has a metabolic acidosis  Will continue the bicarbonate drip     Respiratory acidosis  Add ipratropium/albuterol by nebulizer and this should help with compensation    Hypertension  Her blood pressure has been invariably over 140 systolic and have not been aggressive in lowering it as I wanted to avoid the risk of aggravating acute kidney injury if it dropped to precipitously.  I certainly do not want her back on captopril.  We will just watch for now as it has only been higher than 170 mm systolic a couple of times.    Anemia of chronic disease without iron deficiency  Last hemoglobin on 4 July was 9.1.  Will repeat tomorrow    Alexandr Daniel MD  07/07/24  12:06 EDT    D/w dr Collado at 106pm/Fredy

## 2024-07-08 LAB
ALBUMIN SERPL-MCNC: 3.2 G/DL (ref 3.5–5.2)
ANION GAP SERPL CALCULATED.3IONS-SCNC: 11 MMOL/L (ref 5–15)
BUN SERPL-MCNC: 56 MG/DL (ref 8–23)
BUN/CREAT SERPL: 17.2 (ref 7–25)
CALCIUM SPEC-SCNC: 9 MG/DL (ref 8.6–10.5)
CHLORIDE SERPL-SCNC: 107 MMOL/L (ref 98–107)
CO2 SERPL-SCNC: 21 MMOL/L (ref 22–29)
CREAT SERPL-MCNC: 3.26 MG/DL (ref 0.57–1)
DEPRECATED RDW RBC AUTO: 46 FL (ref 37–54)
EGFRCR SERPLBLD CKD-EPI 2021: 14.1 ML/MIN/1.73
ERYTHROCYTE [DISTWIDTH] IN BLOOD BY AUTOMATED COUNT: 13.1 % (ref 12.3–15.4)
GLUCOSE BLDC GLUCOMTR-MCNC: 132 MG/DL (ref 70–130)
GLUCOSE BLDC GLUCOMTR-MCNC: 166 MG/DL (ref 70–130)
GLUCOSE BLDC GLUCOMTR-MCNC: 208 MG/DL (ref 70–130)
GLUCOSE BLDC GLUCOMTR-MCNC: 87 MG/DL (ref 70–130)
GLUCOSE BLDC GLUCOMTR-MCNC: 95 MG/DL (ref 70–130)
GLUCOSE SERPL-MCNC: 89 MG/DL (ref 65–99)
HCT VFR BLD AUTO: 28.2 % (ref 34–46.6)
HGB BLD-MCNC: 9 G/DL (ref 12–15.9)
MCH RBC QN AUTO: 30.7 PG (ref 26.6–33)
MCHC RBC AUTO-ENTMCNC: 31.9 G/DL (ref 31.5–35.7)
MCV RBC AUTO: 96.2 FL (ref 79–97)
PHOSPHATE SERPL-MCNC: 4.4 MG/DL (ref 2.5–4.5)
PLATELET # BLD AUTO: 302 10*3/MM3 (ref 140–450)
PMV BLD AUTO: 8.8 FL (ref 6–12)
POTASSIUM SERPL-SCNC: 4.4 MMOL/L (ref 3.5–5.2)
RBC # BLD AUTO: 2.93 10*6/MM3 (ref 3.77–5.28)
SODIUM SERPL-SCNC: 139 MMOL/L (ref 136–145)
WBC NRBC COR # BLD AUTO: 7.59 10*3/MM3 (ref 3.4–10.8)

## 2024-07-08 PROCEDURE — 80069 RENAL FUNCTION PANEL: CPT | Performed by: INTERNAL MEDICINE

## 2024-07-08 PROCEDURE — 94799 UNLISTED PULMONARY SVC/PX: CPT

## 2024-07-08 PROCEDURE — 82948 REAGENT STRIP/BLOOD GLUCOSE: CPT

## 2024-07-08 PROCEDURE — 97161 PT EVAL LOW COMPLEX 20 MIN: CPT

## 2024-07-08 PROCEDURE — 25010000002 HEPARIN (PORCINE) PER 1000 UNITS: Performed by: HOSPITALIST

## 2024-07-08 PROCEDURE — 99232 SBSQ HOSP IP/OBS MODERATE 35: CPT

## 2024-07-08 PROCEDURE — 94761 N-INVAS EAR/PLS OXIMETRY MLT: CPT

## 2024-07-08 PROCEDURE — 85027 COMPLETE CBC AUTOMATED: CPT | Performed by: INTERNAL MEDICINE

## 2024-07-08 PROCEDURE — 25810000003 LACTATED RINGERS PER 1000 ML: Performed by: INTERNAL MEDICINE

## 2024-07-08 PROCEDURE — 63710000001 INSULIN LISPRO (HUMAN) PER 5 UNITS: Performed by: HOSPITALIST

## 2024-07-08 RX ORDER — HYDRALAZINE HYDROCHLORIDE 10 MG/1
10 TABLET, FILM COATED ORAL EVERY 8 HOURS SCHEDULED
Status: DISCONTINUED | OUTPATIENT
Start: 2024-07-08 | End: 2024-07-09

## 2024-07-08 RX ORDER — HYDRALAZINE HYDROCHLORIDE 25 MG/1
25 TABLET, FILM COATED ORAL EVERY 8 HOURS SCHEDULED
Status: DISCONTINUED | OUTPATIENT
Start: 2024-07-08 | End: 2024-07-08

## 2024-07-08 RX ORDER — IPRATROPIUM BROMIDE AND ALBUTEROL SULFATE 2.5; .5 MG/3ML; MG/3ML
3 SOLUTION RESPIRATORY (INHALATION) EVERY 6 HOURS PRN
Status: DISCONTINUED | OUTPATIENT
Start: 2024-07-08 | End: 2024-07-10 | Stop reason: HOSPADM

## 2024-07-08 RX ORDER — SODIUM CHLORIDE, SODIUM LACTATE, POTASSIUM CHLORIDE, CALCIUM CHLORIDE 600; 310; 30; 20 MG/100ML; MG/100ML; MG/100ML; MG/100ML
50 INJECTION, SOLUTION INTRAVENOUS CONTINUOUS
Status: DISCONTINUED | OUTPATIENT
Start: 2024-07-08 | End: 2024-07-09

## 2024-07-08 RX ORDER — AMLODIPINE BESYLATE 5 MG/1
2.5 TABLET ORAL
Status: DISCONTINUED | OUTPATIENT
Start: 2024-07-08 | End: 2024-07-09

## 2024-07-08 RX ADMIN — PANTOPRAZOLE SODIUM 40 MG: 40 TABLET, DELAYED RELEASE ORAL at 05:20

## 2024-07-08 RX ADMIN — HYDRALAZINE HYDROCHLORIDE 10 MG: 10 TABLET ORAL at 13:28

## 2024-07-08 RX ADMIN — HYDRALAZINE HYDROCHLORIDE 10 MG: 10 TABLET ORAL at 04:38

## 2024-07-08 RX ADMIN — HYDRALAZINE HYDROCHLORIDE 25 MG: 25 TABLET ORAL at 08:32

## 2024-07-08 RX ADMIN — SODIUM BICARBONATE: 84 INJECTION, SOLUTION INTRAVENOUS at 05:23

## 2024-07-08 RX ADMIN — METOPROLOL SUCCINATE 50 MG: 50 TABLET, EXTENDED RELEASE ORAL at 08:33

## 2024-07-08 RX ADMIN — ATORVASTATIN CALCIUM 10 MG: 10 TABLET, FILM COATED ORAL at 08:33

## 2024-07-08 RX ADMIN — TIMOLOL MALEATE 1 DROP: 5 SOLUTION OPHTHALMIC at 21:16

## 2024-07-08 RX ADMIN — MONTELUKAST SODIUM 10 MG: 10 TABLET, COATED ORAL at 21:02

## 2024-07-08 RX ADMIN — HEPARIN SODIUM 5000 UNITS: 5000 INJECTION INTRAVENOUS; SUBCUTANEOUS at 08:32

## 2024-07-08 RX ADMIN — SODIUM CHLORIDE, POTASSIUM CHLORIDE, SODIUM LACTATE AND CALCIUM CHLORIDE 50 ML/HR: 600; 310; 30; 20 INJECTION, SOLUTION INTRAVENOUS at 11:15

## 2024-07-08 RX ADMIN — DOCUSATE SODIUM 50 MG AND SENNOSIDES 8.6 MG 2 TABLET: 8.6; 5 TABLET, FILM COATED ORAL at 21:02

## 2024-07-08 RX ADMIN — IPRATROPIUM BROMIDE AND ALBUTEROL SULFATE 3 ML: .5; 3 SOLUTION RESPIRATORY (INHALATION) at 09:02

## 2024-07-08 RX ADMIN — INSULIN LISPRO 2 UNITS: 100 INJECTION, SOLUTION INTRAVENOUS; SUBCUTANEOUS at 11:24

## 2024-07-08 RX ADMIN — INSULIN LISPRO 3 UNITS: 100 INJECTION, SOLUTION INTRAVENOUS; SUBCUTANEOUS at 21:02

## 2024-07-08 RX ADMIN — HYDRALAZINE HYDROCHLORIDE 10 MG: 10 TABLET ORAL at 21:02

## 2024-07-08 RX ADMIN — DOCUSATE SODIUM 50 MG AND SENNOSIDES 8.6 MG 2 TABLET: 8.6; 5 TABLET, FILM COATED ORAL at 08:33

## 2024-07-08 RX ADMIN — LEVOTHYROXINE SODIUM 25 MCG: 25 TABLET ORAL at 08:33

## 2024-07-08 RX ADMIN — AMLODIPINE BESYLATE 2.5 MG: 5 TABLET ORAL at 08:33

## 2024-07-08 RX ADMIN — TIMOLOL MALEATE 1 DROP: 5 SOLUTION OPHTHALMIC at 08:34

## 2024-07-08 RX ADMIN — Medication 10 ML: at 21:02

## 2024-07-08 RX ADMIN — HEPARIN SODIUM 5000 UNITS: 5000 INJECTION INTRAVENOUS; SUBCUTANEOUS at 21:01

## 2024-07-08 RX ADMIN — ASPIRIN 325 MG: 325 TABLET, COATED ORAL at 21:02

## 2024-07-08 RX ADMIN — Medication 10 ML: at 08:34

## 2024-07-08 RX ADMIN — OXYBUTYNIN CHLORIDE 5 MG: 5 TABLET, EXTENDED RELEASE ORAL at 08:33
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
## 2024-07-08 NOTE — PROGRESS NOTES
Nephrology Progress Note    Interval History:     07/08/2024 patient is feeling better denies any shortness of breath no nausea vomiting diarrhea is eating well        Vital Signs  Temp:  [97.8 °F (36.6 °C)-98.4 °F (36.9 °C)] 98 °F (36.7 °C)  Heart Rate:  [66-78] 69  Resp:  [18-20] 18  BP: (144-185)/(69-86) 185/80    07/08/2024 examined and reviewed    Physical Exam:  General:                   No distress        HEENT: No pallor                     Neck: No JVD         Lungs:   Clear   Heart:  Regular,  no rub                     Extremities: No edema                     Neurologic: Awake and alert and appropriate        Results Review:    I reviewed the patient's new clinical results.    Lab Results (last 24 hours)       Procedure Component Value Units Date/Time    POC Glucose Once [[id number]]  (Normal) Collected: 07/08/24 0650    Specimen: Blood Updated: 07/08/24 0655     Glucose 95 mg/dL     POC Glucose Once [[id number]]  (Normal) Collected: 07/08/24 0548    Specimen: Blood Updated: 07/08/24 0555     Glucose 87 mg/dL     Renal Function Panel [[id number]]  (Abnormal) Collected: 07/08/24 0022    Specimen: Blood Updated: 07/08/24 0101     Glucose 89 mg/dL      BUN 56 mg/dL      Creatinine 3.26 mg/dL      Sodium 139 mmol/L      Potassium 4.4 mmol/L      Comment: Slight hemolysis detected by analyzer. Result may be falsely elevated.        Chloride 107 mmol/L      CO2 21.0 mmol/L      Calcium 9.0 mg/dL      Albumin 3.2 g/dL      Phosphorus 4.4 mg/dL      Anion Gap 11.0 mmol/L      BUN/Creatinine Ratio 17.2     eGFR 14.1 mL/min/1.73      Comment: <15 Indicative of kidney failure       Narrative:      GFR Normal >60  Chronic Kidney Disease <60  Kidney Failure <15    The GFR formula is only valid for adults with stable renal function between ages 18 and 70.    CBC (No Diff) [[id number]]  (Abnormal) Collected: 07/08/24 0022    Specimen: Blood Updated: 07/08/24  0033     WBC 7.59 10*3/mm3      RBC 2.93 10*6/mm3      Hemoglobin 9.0 g/dL      Hematocrit 28.2 %      MCV 96.2 fL      MCH 30.7 pg      MCHC 31.9 g/dL      RDW 13.1 %      RDW-SD 46.0 fl      MPV 8.8 fL      Platelets 302 10*3/mm3     POC Glucose Once [[id number]]  (Abnormal) Collected: 07/07/24 1933    Specimen: Blood Updated: 07/07/24 1938     Glucose 171 mg/dL     POC Glucose Once [[id number]]  (Normal) Collected: 07/07/24 1645    Specimen: Blood Updated: 07/07/24 1716     Glucose 117 mg/dL     POC Glucose Once [[id number]]  (Abnormal) Collected: 07/07/24 1133    Specimen: Blood Updated: 07/07/24 1149     Glucose 166 mg/dL     POC Glucose Once [[id number]]  (Normal) Collected: 07/07/24 0713    Specimen: Blood Updated: 07/07/24 0729     Glucose 114 mg/dL             Imaging Results (Last 24 Hours)       ** No results found for the last 24 hours. **            Assessment and Plan:    07/08/2024 reviewed and updated    1.  Acute renal failure on CKD stage IV  2.  Severe hyperkalemia with neuro paralytic symptoms now better  3.  Diastolic dysfunction with preserved left ventricular ejection fraction  4.  Type 2 diabetes with renal involvement  5.  Dyslipidemia  6.  Pulmonary hypertension  7.  Hypothyroidism  8.  Proteinuria with albumin creatinine ratio of 154 mg/g and protein creatinine ratio of 385 mg/g on 3/21/2024  9.  Nonobstructing left renal calculus with no hydronephrosis  10.  metabolic acidosis  11.  Respiratory acidosis      07/07/2024  Dr Daniel  Her acute renal failure is steadily improving.    Her uremic symptoms are certainly better.  She has no evidence of fluid overload clinically or radiologically on her chest x-ray, the images of which have personally reviewed. continue IV fluids but will reduce the rate to 50 mL/h as she is eating and drinking better.    She still has a metabolic acidosis  Will continue the bicarbonate drip   Respiratory acidosis  Add ipratropium/albuterol by nebulizer and this should  help with compensation  Hypertension  Her blood pressure has been invariably over 140 systolic and have not been aggressive in lowering it as I wanted to avoid the risk of aggravating acute kidney injury if it dropped to precipitously.  I certainly do not want her back on captopril.  We will just watch for now as it has only been higher than 170 mm systolic a couple of times.  Anemia of chronic disease without iron deficiency  Last hemoglobin on 4 July was 9.1.  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 with possible diabetic nephropathy also , had small kidney stone , most likely had acute kidney injury on chronic kidney stage IV secondary to aggressive drop in blood pressure will continue to hold captopril and change fluids into Ringer lactate  Metabolic acidosis has improved we will change fluids from bicarb drip to Ringer lactate  Blood pressure is also better now slowly getting better to 150s  Potassium is normal    Prognosis guarded    Please avoid nephrotoxic medication and pharmacy to adjust the medication according to the GFR    Plan of care was discussed with the patient, understood verbalized agreed        S Errol Thibodeaux MD  07/08/24  07:14 EDT

## 2024-07-08 NOTE — PROGRESS NOTES
Patient Identification:  Name:  Aggie Perkins  Age:  79 y.o.  Sex:  female  :  1945  MRN:  [id number]  Visit Number:  [id number]  Primary Care Provider:  aSrah Samuel APRN    Length of stay:  5    Subjective/Interval History/Consultants/Procedures     Chief Complaint:   Chief Complaint   Patient presents with    Abnormal Lab       Subjective/Interval History:    79 y.o. female who was admitted on 7/3/2024 with acute on chronic renal failure    PMH is significant for  CKD stage IV, T2DM, HTN, HLD, HFpEF, cognitive impairment   For complete admission information, please see history and physical.     Consultations:  Nephrology   PT  CM    Procedures/Scans:  CXR x 2  CT abdomen and pelvis wo contrast    Today, the patient was seen and examined with family members at bedside. No new complaints noted today. They report patient eating and drinking well. Nephrology adjusting fluids today and overnight. Renal function slowly improving.     Room location at the time of evaluation was [facility].    ----------------------------------------------------------------------------------------------------------------------  [facility] Meds:  amLODIPine, 2.5 mg, Oral, Q24H  aspirin, 325 mg, Oral, Nightly  atorvastatin, 10 mg, Oral, Daily  heparin (porcine), 5,000 Units, Subcutaneous, Q12H  hydrALAZINE, 10 mg, Oral, Q8H  insulin lispro, 2-7 Units, Subcutaneous, 4x Daily AC & at Bedtime  levothyroxine, 25 mcg, Oral, Daily  metoprolol succinate XL, 50 mg, Oral, Daily  montelukast, 10 mg, Oral, Nightly  oxybutynin XL, 5 mg, Oral, Daily  pantoprazole, 40 mg, Oral, Q AM  senna-docusate sodium, 2 tablet, Oral, BID  sodium chloride, 10 mL, Intravenous, Q12H  timolol, 1 drop, Both Eyes, BID      lactated ringers, 50 mL/hr, Last Rate: 50 mL/hr (24 1115)      ----------------------------------------------------------------------------------------------------------------------      Objective     Vital Signs:  Temp:  [97.8 °F  (36.6 °C)-98.3 °F (36.8 °C)] 98.3 °F (36.8 °C)  Heart Rate:  [66-87] 71  Resp:  [18-20] 18  BP: (146-190)/() 159/68      07/06/24  0500 07/07/24  0500 07/08/24  0500   Weight: 63 kg (138 lb 12.8 oz) 62.9 kg (138 lb 9.6 oz) 62.9 kg (138 lb 9.6 oz)     Body mass index is 29.99 kg/m².    Intake/Output Summary (Last 24 hours) at 7/8/2024 1612  Last data filed at 7/8/2024 1330  Gross per 24 hour   Intake 480 ml   Output 300 ml   Net 180 ml     I/O this shift:  In: 240 [P.O.:240]  Out: 300 [Urine:300]  Diet: Cardiac, Diabetic, Renal; Healthy Heart (2-3 Na+); Consistent Carbohydrate; Low Sodium (2-3g), Low Potassium, Low Phosphorus; Fluid Consistency: Thin (IDDSI 0)  ----------------------------------------------------------------------------------------------------------------------    Physical Exam  Vitals and nursing note reviewed.   Constitutional:       General: She is not in acute distress.     Appearance: She is obese.   HENT:      Head: Normocephalic and atraumatic.   Eyes:      Extraocular Movements: Extraocular movements intact.   Cardiovascular:      Rate and Rhythm: Normal rate and regular rhythm.   Pulmonary:      Effort: Pulmonary effort is normal.      Breath sounds: Normal breath sounds.   Abdominal:      Palpations: Abdomen is soft.   Musculoskeletal:      Right lower leg: No edema.      Left lower leg: No edema.   Skin:     General: Skin is warm and dry.   Neurological:      Mental Status: She is alert. Mental status is at baseline.   Psychiatric:         Mood and Affect: Mood normal.                ----------------------------------------------------------------------------------------------------------------------  Tele:      ----------------------------------------------------------------------------------------------------------------------  Results from last 7 days   Lab Units 07/03/24  1713 07/03/24  1530   CK TOTAL U/L  --  79   HSTROP T ng/L 23* 27*   PROBNP pg/mL  --  741.8     Results from  "last 7 days   Lab Units 07/08/24  0022 07/04/24  0117 07/03/24  1638 07/03/24  1530   CRP mg/dL  --   --   --  0.55*   WBC 10*3/mm3 7.59 7.87  --  6.94   HEMOGLOBIN g/dL 9.0* 9.1*  --  10.3*   HEMATOCRIT % 28.2* 28.4*  --  32.2*   MCV fL 96.2 97.9*  --  97.3*   MCHC g/dL 31.9 32.0  --  32.0   PLATELETS 10*3/mm3 302 285  --  339   INR   --   --  1.04  --          Results from last 7 days   Lab Units 07/08/24  0022 07/07/24  0408 07/06/24  0040 07/05/24  0411 07/04/24  0117 07/03/24  1925 07/03/24  1530   SODIUM mmol/L 139 138 140 142 139 137 138   POTASSIUM mmol/L 4.4 4.9 4.6 5.0 5.1 5.1 6.6*   MAGNESIUM mg/dL  --   --   --   --   --   --  2.7*   CHLORIDE mmol/L 107 109* 108* 110* 105 101 99   CO2 mmol/L 21.0* 19.8* 19.9* 20.5* 22.6 22.0 24.6   BUN mg/dL 56* 55* 51* 56* 71* 72* 78*   CREATININE mg/dL 3.26* 3.39* 4.13* 4.70* 5.01* 4.78* 5.28*   CALCIUM mg/dL 9.0 8.7 8.6 8.3* 7.4* 8.0* 8.7   GLUCOSE mg/dL 89 91 104* 99 133* 80 89   ALBUMIN g/dL 3.2* 3.2*  --  3.3* 3.1* 3.6 3.9   BILIRUBIN mg/dL  --   --   --   --  0.2 0.3 0.3   ALK PHOS U/L  --   --   --   --  59 70 72   AST (SGOT) U/L  --   --   --   --  11 12 11   ALT (SGPT) U/L  --   --   --   --  <5 5 5   Estimated Creatinine Clearance: 12 mL/min (A) (by C-G formula based on SCr of 3.26 mg/dL (H)).  No results found for: \"AMMONIA\"      No results found for: \"BLOODCX\"  No results found for: \"URINECX\"  No results found for: \"WOUNDCX\"  No results found for: \"STOOLCX\"  ----------------------------------------------------------------------------------------------------------------------  Imaging Results (Last 24 Hours)       ** No results found for the last 24 hours. **          ----------------------------------------------------------------------------------------------------------------------   I have reviewed the above laboratory values for 07/08/24    Assessment/Plan     Active Hospital Problems    Diagnosis  POA    **Acute on chronic renal failure [N17.9, N18.9]  " Yes         ASSESSMENT/PLAN:    BO on CKD stage IV, improving  Hyperkalemia, resolved   Hypertension   Nephrology consulted and managing. Assistance appreciated.   As metabolic acidosis improved have transitioned fluid replacement to LR  BP elevated this -190 systolic and started low dose norvasc, hydralazine 25 mg x1 and scheduled hydralazine 10 mg Q8H. Metoprolol had been continued earlier in admission  BP trend at goal currently. Continue to monitor closely  Follow up further nephrology recommendations  Repeat BMP in the AM  Continue to avoid nephrotoxins     T2DM  A1c 5.6  Holding oral medications  Covering with SSI. Monitor and titrate as needed.  Currently well controlled     Diastolic CHF, chronic, compensated  Holding torsemide and continuing metoprolol as above   Continue to monitor clinical volume status closely     HLD  Statin     Hypothyroidism  levothyroxine   -----------  -DVT prophylaxis: Subcu heparin  -Disposition plans/anticipated needs: Pending course.  Will benefit from HH at discharge.  Case management assisting with discharge planning.        The patient is high risk due to the following diagnoses/reasons: BO on CKD stage IV        iT Sewell PA-C  07/08/24  16:12 EDT

## 2024-07-08 NOTE — PLAN OF CARE
Pt has been resting in bed this shift. No acute s/s of distress noted. Plan of care ongoing at this time.

## 2024-07-08 NOTE — PLAN OF CARE
Goal Outcome Evaluation:   Pt has been pleasant and cooperative this shift. Pt on RA, sats maintaining well above 90%. Pt up to bathroom, no s/s of distress noted. Currently infusing LR at 50mL/hr. No s/s of acute distress at this time. Pt currently resting in bed. Will continue with plan of care.

## 2024-07-08 NOTE — CASE MANAGEMENT/SOCIAL WORK
Discharge Planning Assessment   [place]     Patient Name: Aggie Perkins  MRN: [id number]  Today's Date: 7/8/2024    Admit Date: 7/3/2024    Plan: SS spoke with pt at bedside. Pt resides at home alone at [address] [facility] [address] and plans to return home at discharge.  Pt currently does not utilize home health services.  Pt has utilized [facility] in the past and requests at discharge.  Pt has wheelchair and home 02 at 2 liters at night via [facility].  Pt does not have POA or Living Will.  SS will follow and assist with discharge planning.     Discharge Plan       Row Name 07/08/24 1532       Plan    Plan SS spoke with pt at bedside. Pt resides at home alone at [address] [facility] [address] and plans to return home at discharge.  Pt currently does not utilize home health services.  Pt has utilized [facility] in the past and requests at discharge.  Pt has wheelchair and home 02 at 2 liters at night via [facility].  Pt does not have POA or Living Will.  SS will follow and assist with discharge planning.                  Continued Care and Services - Admitted Since 7/3/2024    No active coordination exists for this encounter.       Expected Discharge Date and Time       Expected Discharge Date Expected Discharge Time    Jul 6, 2024           KAREEM RecinosW

## 2024-07-08 NOTE — PROGRESS NOTES
[facility] HOSPITALIST PROGRESS NOTE     Patient Identification:  Name:  Aggie Perkins  Age:  79 y.o.  Sex:  female  :  1945  MRN:  [id number]  Visit Number:  [id number]  ROOM: [address] [place] [address]     Primary Care Provider:  Sarah Samuel APRN    Length of stay in inpatient status:  4    Subjective     Chief Compliant:    Chief Complaint   Patient presents with    Abnormal Lab       History of Presenting Illness:    Patient reported feeling well today and denied shortness of breath or pain anywhere. No acute events noted overnight.     Objective     Current Hospital Meds:aspirin, 325 mg, Oral, Nightly  atorvastatin, 10 mg, Oral, Daily  heparin (porcine), 5,000 Units, Subcutaneous, Q12H  insulin lispro, 2-7 Units, Subcutaneous, 4x Daily AC & at Bedtime  ipratropium-albuterol, 3 mL, Nebulization, 4x Daily - RT  levothyroxine, 25 mcg, Oral, Daily  metoprolol succinate XL, 50 mg, Oral, Daily  montelukast, 10 mg, Oral, Nightly  oxybutynin XL, 5 mg, Oral, Daily  pantoprazole, 40 mg, Oral, Q AM  senna-docusate sodium, 2 tablet, Oral, BID  sodium chloride, 10 mL, Intravenous, Q12H  timolol, 1 drop, Both Eyes, BID    sodium chloride 0.45 % 1,000 mL with sodium bicarbonate 8.4 % 50 mEq infusion, , Last Rate: 50 mL/hr at 24 1232        Current Antimicrobial Therapy:  Anti-Infectives (From admission, onward)      None          Current Diuretic Therapy:  Diuretics (From admission, onward)      None          ----------------------------------------------------------------------------------------------------------------------  Vital Signs:  Temp:  [97.4 °F (36.3 °C)-98.7 °F (37.1 °C)] 98 °F (36.7 °C)  Heart Rate:  [67-78] 74  Resp:  [18-20] 20  BP: (138-175)/(66-86) 174/72  SpO2:  [93 %-97 %] 93 %  on   ;   Device (Oxygen Therapy): room air  Body mass index is 29.99 kg/m².    Wt Readings from Last 3 Encounters:   24 62.9 kg (138 lb 9.6 oz)   24 66.4 kg (146 lb 6.4 oz)   10/25/23 67.4 kg (148 lb 9.6  oz)     Intake & Output (last 3 days)         07/05 0701  07/06 0700 07/06 0701 07/07 0700 07/07 0701  07/08 0700    P.O. 230 1650 110    I.V. (mL/kg)  1000 (15.9)     Total Intake(mL/kg) 230 (3.7) 2650 (42.1) 110 (1.7)    Urine (mL/kg/hr) 0 (0)  700 (0.8)    Stool 1      Total Output 1  700    Net +229 +2650 -590           Urine Unmeasured Occurrence 6 x 4 x 1 x    Stool Unmeasured Occurrence  1 x           Diet: Cardiac, Diabetic, Renal; Healthy Heart (2-3 Na+); Consistent Carbohydrate; Low Sodium (2-3g), Low Potassium, Low Phosphorus; Fluid Consistency: Thin (IDDSI 0)  ----------------------------------------------------------------------------------------------------------------------  Physical exam:   Constitutional:  Well-developed and well-nourished.  No acute distress. Lying in bed but sat up for exam.   HENT:  Head:  Normocephalic and atraumatic.    Cardiovascular:  Normal rate, regular rhythm, no murmur  Pulmonary/Chest:  No respiratory distress, speaks in complete sentences. Bibasilar crackles present.  Musculoskeletal:  No deformity.      Neurological: Awake, alert, no focal deficit on gross examination. No slurred speech or facial droop.   Skin:  Skin is warm and dry.   Peripheral vascular:  No cyanosis, no extremity edema.  Psychiatric: Appropriate mood and affect      Edited by: Ani Collado DO at 7/7/2024 2130  ----------------------------------------------------------------------------------------------------------------------  Results from last 7 days   Lab Units 07/04/24  0117 07/03/24  1638 07/03/24  1530   CRP mg/dL  --   --  0.55*   WBC 10*3/mm3 7.87  --  6.94   HEMOGLOBIN g/dL 9.1*  --  10.3*   HEMATOCRIT % 28.4*  --  32.2*   MCV fL 97.9*  --  97.3*   MCHC g/dL 32.0  --  32.0   PLATELETS 10*3/mm3 285  --  339   INR   --  1.04  --          Results from last 7 days   Lab Units 07/07/24  0408 07/06/24  0040 07/05/24  0411 07/04/24  0117 07/03/24  1925 07/03/24  1530   SODIUM mmol/L 138 140  "142 139 137 138   POTASSIUM mmol/L 4.9 4.6 5.0 5.1 5.1 6.6*   MAGNESIUM mg/dL  --   --   --   --   --  2.7*   CHLORIDE mmol/L 109* 108* 110* 105 101 99   CO2 mmol/L 19.8* 19.9* 20.5* 22.6 22.0 24.6   BUN mg/dL 55* 51* 56* 71* 72* 78*   CREATININE mg/dL 3.39* 4.13* 4.70* 5.01* 4.78* 5.28*   CALCIUM mg/dL 8.7 8.6 8.3* 7.4* 8.0* 8.7   PHOSPHORUS mg/dL 4.8*  --  4.6*  --   --   --    GLUCOSE mg/dL 91 104* 99 133* 80 89   ALBUMIN g/dL 3.2*  --  3.3* 3.1* 3.6 3.9   BILIRUBIN mg/dL  --   --   --  0.2 0.3 0.3   ALK PHOS U/L  --   --   --  59 70 72   AST (SGOT) U/L  --   --   --  11 12 11   ALT (SGPT) U/L  --   --   --  <5 5 5   Estimated Creatinine Clearance: 11.5 mL/min (A) (by C-G formula based on SCr of 3.39 mg/dL (H)).  No results found for: \"AMMONIA\"  Results from last 7 days   Lab Units 07/03/24  1713 07/03/24  1530   CK TOTAL U/L  --  79   HSTROP T ng/L 23* 27*     Results from last 7 days   Lab Units 07/03/24  1530   PROBNP pg/mL 741.8         Glucose   Date/Time Value Ref Range Status   07/07/2024 1933 171 (H) 70 - 130 mg/dL Final   07/07/2024 1645 117 70 - 130 mg/dL Final   07/07/2024 1133 166 (H) 70 - 130 mg/dL Final   07/07/2024 0713 114 70 - 130 mg/dL Final   07/06/2024 1855 238 (H) 70 - 130 mg/dL Final   07/06/2024 1602 100 70 - 130 mg/dL Final   07/06/2024 1017 265 (H) 70 - 130 mg/dL Final   07/06/2024 0643 254 (H) 70 - 130 mg/dL Final     Lab Results   Component Value Date    TSH 1.930 07/03/2024    FREET4 1.57 07/03/2024     No results found for: \"PREGTESTUR\", \"PREGSERUM\", \"HCG\", \"HCGQUANT\"  Pain Management Panel  More data may exist         Latest Ref Rng & Units 7/3/2024 3/21/2024   Pain Management Panel   Creatinine, Urine mg/dL 40.4  74.0  74.0    Amphetamine, Urine Qual Negative Negative  -   Barbiturates Screen, Urine Negative Negative  -   Benzodiazepine Screen, Urine Negative Negative  -   Buprenorphine, Screen, Urine Negative Negative  -   Cocaine Screen, Urine Negative Negative  -   Fentanyl, " "Urine Negative Negative  -   Methadone Screen , Urine Negative Negative  -   Methamphetamine, Ur Negative Negative  -     Brief Urine Lab Results  (Last result in the past 365 days)        Color   Clarity   Blood   Leuk Est   Nitrite   Protein   CREAT   Urine HCG        07/03/24 1637             40.4         07/03/24 1637 Yellow   Clear   Moderate (2+)   Trace   Negative   100 mg/dL (2+)                 No results found for: \"BLOODCX\"  No results found for: \"URINECX\"  No results found for: \"WOUNDCX\"  No results found for: \"STOOLCX\"  No results found for: \"RESPCX\"  No results found for: \"AFBCX\"  Results from last 7 days   Lab Units 07/03/24  1530   SED RATE mm/hr 37*   CRP mg/dL 0.55*       I have personally looked at the labs and they are summarized above.  ----------------------------------------------------------------------------------------------------------------------  Detailed radiology reports for the last 24 hours:  Imaging Results (Last 24 Hours)       Procedure Component Value Units Date/Time    XR Chest 1 View [[id number]] Collected: 07/07/24 0624     Updated: 07/07/24 0627    Narrative:      PROCEDURE: Portable chest x-ray examination performed on July 7, 2024.  Single view. Supine position.     HISTORY: Shortness of breath.     COMPARISON: None.     FINDINGS:     Heart size at the upper limits of normal.  No lobar consolidation or edema.   No pleural effusion or pneumothorax.  No fracture or foreign body.       Impression:         1.  Heart size at the upper limits of normal.  2.  No lobar consolidation or edema.  3.  No pleural effusion or pneumothorax.  4.  No free air in the upper abdomen.  5.  Slightly elevated right hemidiaphragm.     This report was finalized on 7/7/2024 6:25 AM by Amandeep Saleh MD.             Assessment & Plan      #BO on CKD, improving  #Hyperkalemia, resolved  #Type II DM  #Hypertension  #CHFpEF  #Hyperlipidemia   #Hypothyroidism   #Non-anion gap metabolic acidosis    -Renal " function is improving.  IV fluids are being continued by nephrology with bicarb drip for treatment of acute kidney injury with metabolic acidosis.  Repeat BMP in AM.  -Patient has bibasilar crackles on lung exam, but no radiographic evidence of fluid overload. No increased O2 requirement. Monitor intake and output.  -She remains high risk for needing dialysis  -Monitor on telemetry  Edited by: Ani Collado DO at 7/7/2024 2134    Active VTE Prophylaxis:  Pharmacologic:        Start     Dose Route Frequency Stop    07/03/24 2130  heparin (porcine) 5000 UNIT/ML injection 5,000 Units         5,000 Units SC Every 12 Hours Scheduled --                    Dispo:  likely home at discharge pending clinical improvement     Ani Collado DO  [facility]  07/07/24  21:34 EDT

## 2024-07-09 LAB
ALBUMIN SERPL-MCNC: 3.4 G/DL (ref 3.5–5.2)
ALBUMIN/GLOB SERPL: 1 G/DL
ALP SERPL-CCNC: 92 U/L (ref 39–117)
ALT SERPL W P-5'-P-CCNC: 41 U/L (ref 1–33)
ANION GAP SERPL CALCULATED.3IONS-SCNC: 9 MMOL/L (ref 5–15)
AST SERPL-CCNC: 72 U/L (ref 1–32)
BILIRUB SERPL-MCNC: 0.3 MG/DL (ref 0–1.2)
BUN SERPL-MCNC: 54 MG/DL (ref 8–23)
BUN/CREAT SERPL: 16.3 (ref 7–25)
CALCIUM SPEC-SCNC: 9.1 MG/DL (ref 8.6–10.5)
CHLORIDE SERPL-SCNC: 108 MMOL/L (ref 98–107)
CO2 SERPL-SCNC: 23 MMOL/L (ref 22–29)
CREAT SERPL-MCNC: 3.32 MG/DL (ref 0.57–1)
EGFRCR SERPLBLD CKD-EPI 2021: 13.8 ML/MIN/1.73
GLOBULIN UR ELPH-MCNC: 3.3 GM/DL
GLUCOSE BLDC GLUCOMTR-MCNC: 116 MG/DL (ref 70–130)
GLUCOSE BLDC GLUCOMTR-MCNC: 143 MG/DL (ref 70–130)
GLUCOSE BLDC GLUCOMTR-MCNC: 187 MG/DL (ref 70–130)
GLUCOSE BLDC GLUCOMTR-MCNC: 198 MG/DL (ref 70–130)
GLUCOSE SERPL-MCNC: 92 MG/DL (ref 65–99)
POTASSIUM SERPL-SCNC: 4.7 MMOL/L (ref 3.5–5.2)
PROT SERPL-MCNC: 6.7 G/DL (ref 6–8.5)
SODIUM SERPL-SCNC: 140 MMOL/L (ref 136–145)

## 2024-07-09 PROCEDURE — 94799 UNLISTED PULMONARY SVC/PX: CPT

## 2024-07-09 PROCEDURE — 63710000001 INSULIN LISPRO (HUMAN) PER 5 UNITS: Performed by: HOSPITALIST

## 2024-07-09 PROCEDURE — 25010000002 HEPARIN (PORCINE) PER 1000 UNITS: Performed by: HOSPITALIST

## 2024-07-09 PROCEDURE — 94761 N-INVAS EAR/PLS OXIMETRY MLT: CPT

## 2024-07-09 PROCEDURE — 99232 SBSQ HOSP IP/OBS MODERATE 35: CPT

## 2024-07-09 PROCEDURE — 82948 REAGENT STRIP/BLOOD GLUCOSE: CPT

## 2024-07-09 PROCEDURE — 25810000003 LACTATED RINGERS PER 1000 ML: Performed by: INTERNAL MEDICINE

## 2024-07-09 PROCEDURE — 80053 COMPREHEN METABOLIC PANEL: CPT | Performed by: INTERNAL MEDICINE

## 2024-07-09 RX ORDER — HYDRALAZINE HYDROCHLORIDE 25 MG/1
25 TABLET, FILM COATED ORAL EVERY 8 HOURS SCHEDULED
Status: DISCONTINUED | OUTPATIENT
Start: 2024-07-09 | End: 2024-07-10 | Stop reason: HOSPADM

## 2024-07-09 RX ORDER — AMLODIPINE BESYLATE 5 MG/1
5 TABLET ORAL
Status: DISCONTINUED | OUTPATIENT
Start: 2024-07-10 | End: 2024-07-10 | Stop reason: HOSPADM

## 2024-07-09 RX ORDER — AMLODIPINE BESYLATE 5 MG/1
2.5 TABLET ORAL ONCE
Status: COMPLETED | OUTPATIENT
Start: 2024-07-09 | End: 2024-07-09

## 2024-07-09 RX ADMIN — TIMOLOL MALEATE 1 DROP: 5 SOLUTION OPHTHALMIC at 08:52

## 2024-07-09 RX ADMIN — INSULIN LISPRO 2 UNITS: 100 INJECTION, SOLUTION INTRAVENOUS; SUBCUTANEOUS at 21:20

## 2024-07-09 RX ADMIN — METOPROLOL SUCCINATE 50 MG: 50 TABLET, EXTENDED RELEASE ORAL at 08:51

## 2024-07-09 RX ADMIN — Medication 10 ML: at 08:52

## 2024-07-09 RX ADMIN — INSULIN LISPRO 2 UNITS: 100 INJECTION, SOLUTION INTRAVENOUS; SUBCUTANEOUS at 12:21

## 2024-07-09 RX ADMIN — HYDRALAZINE HYDROCHLORIDE 10 MG: 10 TABLET ORAL at 05:13

## 2024-07-09 RX ADMIN — MONTELUKAST SODIUM 10 MG: 10 TABLET, COATED ORAL at 21:20

## 2024-07-09 RX ADMIN — HEPARIN SODIUM 5000 UNITS: 5000 INJECTION INTRAVENOUS; SUBCUTANEOUS at 21:20

## 2024-07-09 RX ADMIN — OXYBUTYNIN CHLORIDE 5 MG: 5 TABLET, EXTENDED RELEASE ORAL at 08:51

## 2024-07-09 RX ADMIN — ASPIRIN 325 MG: 325 TABLET, COATED ORAL at 21:20

## 2024-07-09 RX ADMIN — AMLODIPINE BESYLATE 2.5 MG: 5 TABLET ORAL at 08:51

## 2024-07-09 RX ADMIN — HYDRALAZINE HYDROCHLORIDE 25 MG: 25 TABLET ORAL at 21:20

## 2024-07-09 RX ADMIN — HEPARIN SODIUM 5000 UNITS: 5000 INJECTION INTRAVENOUS; SUBCUTANEOUS at 08:52

## 2024-07-09 RX ADMIN — PANTOPRAZOLE SODIUM 40 MG: 40 TABLET, DELAYED RELEASE ORAL at 05:13

## 2024-07-09 RX ADMIN — SODIUM CHLORIDE, POTASSIUM CHLORIDE, SODIUM LACTATE AND CALCIUM CHLORIDE 50 ML/HR: 600; 310; 30; 20 INJECTION, SOLUTION INTRAVENOUS at 12:21

## 2024-07-09 RX ADMIN — Medication 10 ML: at 21:21

## 2024-07-09 RX ADMIN — DOCUSATE SODIUM 50 MG AND SENNOSIDES 8.6 MG 2 TABLET: 8.6; 5 TABLET, FILM COATED ORAL at 08:52

## 2024-07-09 RX ADMIN — AMLODIPINE BESYLATE 2.5 MG: 5 TABLET ORAL at 09:10

## 2024-07-09 RX ADMIN — HYDRALAZINE HYDROCHLORIDE 10 MG: 10 TABLET ORAL at 23:46

## 2024-07-09 RX ADMIN — ATORVASTATIN CALCIUM 10 MG: 10 TABLET, FILM COATED ORAL at 08:51

## 2024-07-09 RX ADMIN — TIMOLOL MALEATE 1 DROP: 5 SOLUTION OPHTHALMIC at 21:21

## 2024-07-09 RX ADMIN — LEVOTHYROXINE SODIUM 25 MCG: 25 TABLET ORAL at 08:52

## 2024-07-09 NOTE — PLAN OF CARE
Goal Outcome Evaluation:  Pt has been pleasant and cooperative this shift. No current difficulties using the restroom with 1 assist. Pt resting in bed. VSS, no acute s/s of distress at this time. Will continue plan of care.

## 2024-07-09 NOTE — PROGRESS NOTES
Patient Identification:  Name:  Aggie Perkins  Age:  79 y.o.  Sex:  female  :  1945  MRN:  [id number]  Visit Number:  [id number]  Primary Care Provider:  Sarah Samuel APRN    Length of stay:  6    Subjective/Interval History/Consultants/Procedures     Chief Complaint:   Chief Complaint   Patient presents with    Abnormal Lab       Subjective/Interval History:    79 y.o. female who was admitted on 7/3/2024 with acute on chronic renal failure     PMH is significant for CKD stage IV, T2DM, HTN, HLD, HFpEF, cognitive impairment   For complete admission information, please see history and physical.     Consultations:  Nephrology   PT  CM    Procedures/Scans:  CXR x 2  CT abdomen and pelvis wo contrast    Today, the patient was seen and examined with family at bedside. She was sitting in bedside chair reporting she felt well today. No change in UOP noted. Discussed BP regimen. No new complaints noted.      Room location at the time of evaluation was [facility].    ----------------------------------------------------------------------------------------------------------------------  [facility] Meds:  [START ON 7/10/2024] amLODIPine, 5 mg, Oral, Q24H  aspirin, 325 mg, Oral, Nightly  atorvastatin, 10 mg, Oral, Daily  heparin (porcine), 5,000 Units, Subcutaneous, Q12H  hydrALAZINE, 25 mg, Oral, Q8H  insulin lispro, 2-7 Units, Subcutaneous, 4x Daily AC & at Bedtime  levothyroxine, 25 mcg, Oral, Daily  metoprolol succinate XL, 50 mg, Oral, Daily  montelukast, 10 mg, Oral, Nightly  oxybutynin XL, 5 mg, Oral, Daily  pantoprazole, 40 mg, Oral, Q AM  senna-docusate sodium, 2 tablet, Oral, BID  sodium chloride, 10 mL, Intravenous, Q12H  timolol, 1 drop, Both Eyes, BID         ----------------------------------------------------------------------------------------------------------------------      Objective     Vital Signs:  Temp:  [97.1 °F (36.2 °C)-99 °F (37.2 °C)] 98.1 °F (36.7 °C)  Heart Rate:  [69-78] 70  Resp:   [18] 18  BP: (129-185)/(66-81) 156/66      07/07/24  0500 07/08/24  0500 07/09/24  0544   Weight: 62.9 kg (138 lb 9.6 oz) 62.9 kg (138 lb 9.6 oz) 66.4 kg (146 lb 6.2 oz)     Body mass index is 31.68 kg/m².    Intake/Output Summary (Last 24 hours) at 7/9/2024 1457  Last data filed at 7/9/2024 1456  Gross per 24 hour   Intake 870 ml   Output 1400 ml   Net -530 ml     I/O this shift:  In: 870 [P.O.:870]  Out: 400 [Urine:400]  Diet: Cardiac, Diabetic, Renal; Healthy Heart (2-3 Na+); Consistent Carbohydrate; Low Sodium (2-3g), Low Potassium, Low Phosphorus; Fluid Consistency: Thin (IDDSI 0)  ----------------------------------------------------------------------------------------------------------------------    Physical Exam  Vitals and nursing note reviewed.   Constitutional:       General: She is not in acute distress.     Appearance: She is obese.   HENT:      Head: Normocephalic and atraumatic.   Eyes:      Extraocular Movements: Extraocular movements intact.      Conjunctiva/sclera: Conjunctivae normal.   Cardiovascular:      Rate and Rhythm: Normal rate.   Pulmonary:      Effort: Pulmonary effort is normal. No respiratory distress.   Abdominal:      Palpations: Abdomen is soft.   Musculoskeletal:      Right lower leg: No edema.      Left lower leg: No edema.   Skin:     General: Skin is warm and dry.   Neurological:      Mental Status: She is alert. Mental status is at baseline.   Psychiatric:         Mood and Affect: Mood normal.         Behavior: Behavior normal.                ----------------------------------------------------------------------------------------------------------------------  Tele:      ----------------------------------------------------------------------------------------------------------------------  Results from last 7 days   Lab Units 07/03/24  1713 07/03/24  1530   CK TOTAL U/L  --  79   HSTROP T ng/L 23* 27*   PROBNP pg/mL  --  741.8     Results from last 7 days   Lab Units  "07/08/24  0022 07/04/24  0117 07/03/24  1638 07/03/24  1530   CRP mg/dL  --   --   --  0.55*   WBC 10*3/mm3 7.59 7.87  --  6.94   HEMOGLOBIN g/dL 9.0* 9.1*  --  10.3*   HEMATOCRIT % 28.2* 28.4*  --  32.2*   MCV fL 96.2 97.9*  --  97.3*   MCHC g/dL 31.9 32.0  --  32.0   PLATELETS 10*3/mm3 302 285  --  339   INR   --   --  1.04  --          Results from last 7 days   Lab Units 07/09/24  0050 07/08/24  0022 07/07/24  0408 07/05/24  0411 07/04/24  0117 07/03/24  1925 07/03/24  1530   SODIUM mmol/L 140 139 138   < > 139 137 138   POTASSIUM mmol/L 4.7 4.4 4.9   < > 5.1 5.1 6.6*   MAGNESIUM mg/dL  --   --   --   --   --   --  2.7*   CHLORIDE mmol/L 108* 107 109*   < > 105 101 99   CO2 mmol/L 23.0 21.0* 19.8*   < > 22.6 22.0 24.6   BUN mg/dL 54* 56* 55*   < > 71* 72* 78*   CREATININE mg/dL 3.32* 3.26* 3.39*   < > 5.01* 4.78* 5.28*   CALCIUM mg/dL 9.1 9.0 8.7   < > 7.4* 8.0* 8.7   GLUCOSE mg/dL 92 89 91   < > 133* 80 89   ALBUMIN g/dL 3.4* 3.2* 3.2*   < > 3.1* 3.6 3.9   BILIRUBIN mg/dL 0.3  --   --   --  0.2 0.3 0.3   ALK PHOS U/L 92  --   --   --  59 70 72   AST (SGOT) U/L 72*  --   --   --  11 12 11   ALT (SGPT) U/L 41*  --   --   --  <5 5 5    < > = values in this interval not displayed.   Estimated Creatinine Clearance: 12.1 mL/min (A) (by C-G formula based on SCr of 3.32 mg/dL (H)).  No results found for: \"AMMONIA\"      No results found for: \"BLOODCX\"  No results found for: \"URINECX\"  No results found for: \"WOUNDCX\"  No results found for: \"STOOLCX\"  ----------------------------------------------------------------------------------------------------------------------  Imaging Results (Last 24 Hours)       ** No results found for the last 24 hours. **          ----------------------------------------------------------------------------------------------------------------------   I have reviewed the above laboratory values for 07/09/24    Assessment/Plan     Active Hospital Problems    Diagnosis  POA    **Acute on chronic " renal failure [N17.9, N18.9]  Yes         ASSESSMENT/PLAN:    BO on CKD stage IV, improved  Hyperkalemia, resolved   Hypertension   Nephrology consulted and managing. Assistance appreciated.   As metabolic acidosis improved have transitioned fluid replacement to LR  BP elevated 180-190 systolic and started low dose norvasc, hydralazine 25 mg x1 and scheduled hydralazine 10 mg Q8H. Metoprolol had been continued earlier in admission  BP remaining elevated early this AM and increased norvasc to 5 mg daily, increased hydralazine to 25 mg TID Continue to monitor closely  Creatinine remaining around 3.3 over the previous 3 repeats.  Repeat UA ordered. Follow up further nephrology recommendations  Repeat labs in the AM  Continue to avoid nephrotoxins      T2DM  A1c 5.6  Holding oral medications  Covering with SSI. Monitor and titrate as needed.  Currently well controlled     Diastolic CHF, chronic, compensated  Holding torsemide and continuing metoprolol as above. Would defer decision to/how to resume diuretic therapy to nephrology at discharge.  Continue to monitor clinical volume status closely- remaining stable with torsemide held.     HLD  Statin     Hypothyroidism  levothyroxine     -----------  -DVT prophylaxis: subcu heparin  -Disposition plans/anticipated needs: Pending course and further nephrology recommendations        The patient is high risk due to the following diagnoses/reasons:  BO on CKD IV        Ti Sewell PA-C  07/09/24  14:57 EDT

## 2024-07-09 NOTE — PROGRESS NOTES
"Nephrology Progress Note      Subjective     Patient denied any chest pain or shortness of breath.  Lying on bed comfortably.  Patient's blood pressure noted to be elevated during night    Objective       Vital signs :     Temp:  [97.1 °F (36.2 °C)-99 °F (37.2 °C)] 97.9 °F (36.6 °C)  Heart Rate:  [69-78] 73  Resp:  [18] 18  BP: (129-185)/(68-81) 155/68    Intake/Output                               07/07/24 0701 - 07/08/24 0700 07/08/24 0701 - 07/09/24 0700 07/09/24 0701 - 07/10/24 0700     3035-7741 8260-2183 Total 0264-2604 3738-4854 Total 2165-7505 6132-9938 Total                    Intake    P.O.  110  240 350  480  -- 480  510  -- 510    Total Intake 110 240 350 480 -- 480 510 -- 510       Output    Urine  700  -- 700  1300  -- 1300  400  -- 400    Total Output 700 -- 700 1300 -- 1300 400 -- 400             Physical Exam:    General Appearance : Not in acute distress  Lungs : clear to auscultation, respirations regular  Heart :  regular rhythm & normal rate, normal S1, S2 and no murmur, no rub  Abdomen : Soft, nondistended  Extremities : No edema,   Neurologic :   orientated to person, place, time and situation, Grossly no focal deficits        Laboratory Data :     Albumin Albumin   Date Value Ref Range Status   07/09/2024 3.4 (L) 3.5 - 5.2 g/dL Final   07/08/2024 3.2 (L) 3.5 - 5.2 g/dL Final   07/07/2024 3.2 (L) 3.5 - 5.2 g/dL Final      Magnesium No results found for: \"MG\"       PTH               No results found for: \"PTH\"    CBC and coagulation:  Results from last 7 days   Lab Units 07/08/24  0022 07/04/24  0117 07/03/24  1638 07/03/24  1530   SED RATE mm/hr  --   --   --  37*   CRP mg/dL  --   --   --  0.55*   WBC 10*3/mm3 7.59 7.87  --  6.94   HEMOGLOBIN g/dL 9.0* 9.1*  --  10.3*   HEMATOCRIT % 28.2* 28.4*  --  32.2*   MCV fL 96.2 97.9*  --  97.3*   MCHC g/dL 31.9 32.0  --  32.0   PLATELETS 10*3/mm3 302 285  --  339   INR   --   --  1.04  --      Acid/base balance:      Renal and electrolytes:    Results " from last 7 days   Lab Units 07/09/24  0050 07/08/24  0022 07/07/24  0408 07/06/24  0040 07/05/24  0411 07/03/24  1925 07/03/24  1530   SODIUM mmol/L 140 139 138 140 142   < > 138   POTASSIUM mmol/L 4.7 4.4 4.9 4.6 5.0   < > 6.6*   MAGNESIUM mg/dL  --   --   --   --   --   --  2.7*   CHLORIDE mmol/L 108* 107 109* 108* 110*   < > 99   CO2 mmol/L 23.0 21.0* 19.8* 19.9* 20.5*   < > 24.6   BUN mg/dL 54* 56* 55* 51* 56*   < > 78*   CREATININE mg/dL 3.32* 3.26* 3.39* 4.13* 4.70*   < > 5.28*   CALCIUM mg/dL 9.1 9.0 8.7 8.6 8.3*   < > 8.7   PHOSPHORUS mg/dL  --  4.4 4.8*  --  4.6*  --   --     < > = values in this interval not displayed.     Estimated Creatinine Clearance: 12.1 mL/min (A) (by C-G formula based on SCr of 3.32 mg/dL (H)).  @GFRCG:3@   Liver and pancreatic function:  Results from last 7 days   Lab Units 07/09/24  0050 07/08/24  0022 07/07/24  0408 07/05/24  0411 07/04/24  0117 07/03/24  1925   ALBUMIN g/dL 3.4* 3.2* 3.2*   < > 3.1* 3.6   BILIRUBIN mg/dL 0.3  --   --   --  0.2 0.3   ALK PHOS U/L 92  --   --   --  59 70   AST (SGOT) U/L 72*  --   --   --  11 12   ALT (SGPT) U/L 41*  --   --   --  <5 5    < > = values in this interval not displayed.         Cardiac:  Results from last 7 days   Lab Units 07/03/24  1530   PROBNP pg/mL 741.8     Liver and pancreatic function:  Results from last 7 days   Lab Units 07/09/24  0050 07/08/24  0022 07/07/24  0408 07/05/24  0411 07/04/24  0117 07/03/24  1925   ALBUMIN g/dL 3.4* 3.2* 3.2*   < > 3.1* 3.6   BILIRUBIN mg/dL 0.3  --   --   --  0.2 0.3   ALK PHOS U/L 92  --   --   --  59 70   AST (SGOT) U/L 72*  --   --   --  11 12   ALT (SGPT) U/L 41*  --   --   --  <5 5    < > = values in this interval not displayed.       Medications :     [START ON 7/10/2024] amLODIPine, 5 mg, Oral, Q24H  aspirin, 325 mg, Oral, Nightly  atorvastatin, 10 mg, Oral, Daily  heparin (porcine), 5,000 Units, Subcutaneous, Q12H  hydrALAZINE, 25 mg, Oral, Q8H  insulin lispro, 2-7 Units, Subcutaneous,  4x Daily AC & at Bedtime  levothyroxine, 25 mcg, Oral, Daily  metoprolol succinate XL, 50 mg, Oral, Daily  montelukast, 10 mg, Oral, Nightly  oxybutynin XL, 5 mg, Oral, Daily  pantoprazole, 40 mg, Oral, Q AM  senna-docusate sodium, 2 tablet, Oral, BID  sodium chloride, 10 mL, Intravenous, Q12H  timolol, 1 drop, Both Eyes, BID             Assessment & Plan     -Acute kidney injury  - Chronic kidney disease stage IV  - Hematuria  - Uncontrolled hypertension  - Congestive heart failure, congestive heart failure with preserved ejection fraction  - 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  Captopril is being hold.  Other differentials include glomerulonephritis given significant hematuria.    Uncontrolled hypertension likely due to IV fluid, will discontinue lactated Ringer.  And increase the dose of hydralazine to 25 mg 3 times a day    Repeat urine analysis and if clinically indicated to proceed to full serological workup    Educated and counseled the patient, she was agreeable with the plan.    Reviewed clinical notes, reviewed labs and evaluated radiological data.  Discussed the case in detail with primary team        Talia Mcdaniel MD  07/09/24  14:03 EDT

## 2024-07-09 NOTE — PLAN OF CARE
Goal Outcome Evaluation:      Patient had non eventful day. No complaints this shift. Patient on RA. Patient had BM this shift. VSS at this time. Patient resting in bed, call light in reach, bed alarm set. Plan of care ongoing.

## 2024-07-10 ENCOUNTER — READMISSION MANAGEMENT (OUTPATIENT)
Dept: CALL CENTER | Facility: HOSPITAL | Age: 79
End: 2024-07-10
Payer: MEDICARE

## 2024-07-10 VITALS
WEIGHT: 142.5 LBS | HEIGHT: 57 IN | RESPIRATION RATE: 18 BRPM | HEART RATE: 71 BPM | OXYGEN SATURATION: 97 % | SYSTOLIC BLOOD PRESSURE: 170 MMHG | BODY MASS INDEX: 30.74 KG/M2 | DIASTOLIC BLOOD PRESSURE: 66 MMHG | TEMPERATURE: 98.2 F

## 2024-07-10 LAB
ALBUMIN SERPL-MCNC: 3.3 G/DL (ref 3.5–5.2)
ALBUMIN/GLOB SERPL: 0.9 G/DL
ALP SERPL-CCNC: 95 U/L (ref 39–117)
ALT SERPL W P-5'-P-CCNC: 47 U/L (ref 1–33)
ANION GAP SERPL CALCULATED.3IONS-SCNC: 11.5 MMOL/L (ref 5–15)
AST SERPL-CCNC: 46 U/L (ref 1–32)
BACTERIA UR QL AUTO: ABNORMAL /HPF
BILIRUB SERPL-MCNC: 0.3 MG/DL (ref 0–1.2)
BILIRUB UR QL STRIP: NEGATIVE
BUN SERPL-MCNC: 49 MG/DL (ref 8–23)
BUN/CREAT SERPL: 17.3 (ref 7–25)
CALCIUM SPEC-SCNC: 9.3 MG/DL (ref 8.6–10.5)
CHLORIDE SERPL-SCNC: 104 MMOL/L (ref 98–107)
CLARITY UR: CLEAR
CO2 SERPL-SCNC: 22.5 MMOL/L (ref 22–29)
COLOR UR: YELLOW
CREAT SERPL-MCNC: 2.84 MG/DL (ref 0.57–1)
EGFRCR SERPLBLD CKD-EPI 2021: 16.6 ML/MIN/1.73
GLOBULIN UR ELPH-MCNC: 3.7 GM/DL
GLUCOSE BLDC GLUCOMTR-MCNC: 108 MG/DL (ref 70–130)
GLUCOSE SERPL-MCNC: 107 MG/DL (ref 65–99)
GLUCOSE UR STRIP-MCNC: ABNORMAL MG/DL
HGB UR QL STRIP.AUTO: ABNORMAL
HOLD SPECIMEN: NORMAL
HYALINE CASTS UR QL AUTO: ABNORMAL /LPF
KETONES UR QL STRIP: NEGATIVE
LEUKOCYTE ESTERASE UR QL STRIP.AUTO: ABNORMAL
NITRITE UR QL STRIP: NEGATIVE
PH UR STRIP.AUTO: 6 [PH] (ref 5–8)
POTASSIUM SERPL-SCNC: 4.1 MMOL/L (ref 3.5–5.2)
PROT SERPL-MCNC: 7 G/DL (ref 6–8.5)
PROT UR QL STRIP: ABNORMAL
RBC # UR STRIP: ABNORMAL /HPF
REF LAB TEST METHOD: ABNORMAL
SODIUM SERPL-SCNC: 138 MMOL/L (ref 136–145)
SP GR UR STRIP: 1.01 (ref 1–1.03)
SQUAMOUS #/AREA URNS HPF: ABNORMAL /HPF
UROBILINOGEN UR QL STRIP: ABNORMAL
WBC # UR STRIP: ABNORMAL /HPF

## 2024-07-10 PROCEDURE — 81001 URINALYSIS AUTO W/SCOPE: CPT | Performed by: INTERNAL MEDICINE

## 2024-07-10 PROCEDURE — 94664 DEMO&/EVAL PT USE INHALER: CPT

## 2024-07-10 PROCEDURE — 82948 REAGENT STRIP/BLOOD GLUCOSE: CPT

## 2024-07-10 PROCEDURE — 94761 N-INVAS EAR/PLS OXIMETRY MLT: CPT

## 2024-07-10 PROCEDURE — 94799 UNLISTED PULMONARY SVC/PX: CPT

## 2024-07-10 PROCEDURE — 99239 HOSP IP/OBS DSCHRG MGMT >30: CPT

## 2024-07-10 PROCEDURE — 80053 COMPREHEN METABOLIC PANEL: CPT

## 2024-07-10 RX ORDER — HYDRALAZINE HYDROCHLORIDE 25 MG/1
25 TABLET, FILM COATED ORAL ONCE
Status: COMPLETED | OUTPATIENT
Start: 2024-07-10 | End: 2024-07-10

## 2024-07-10 RX ORDER — AMLODIPINE BESYLATE 5 MG/1
5 TABLET ORAL
Qty: 30 TABLET | Refills: 0 | Status: SHIPPED | OUTPATIENT
Start: 2024-07-10 | End: 2024-08-09

## 2024-07-10 RX ORDER — HYDRALAZINE HYDROCHLORIDE 25 MG/1
25 TABLET, FILM COATED ORAL EVERY 8 HOURS SCHEDULED
Qty: 90 TABLET | Refills: 0 | Status: SHIPPED | OUTPATIENT
Start: 2024-07-10 | End: 2024-08-09

## 2024-07-10 RX ORDER — ATORVASTATIN CALCIUM 10 MG/1
10 TABLET, FILM COATED ORAL DAILY
Qty: 30 TABLET | Refills: 0 | Status: SHIPPED | OUTPATIENT
Start: 2024-07-10 | End: 2024-08-09

## 2024-07-10 RX ADMIN — ATORVASTATIN CALCIUM 10 MG: 10 TABLET, FILM COATED ORAL at 09:21

## 2024-07-10 RX ADMIN — Medication 10 ML: at 09:21

## 2024-07-10 RX ADMIN — TIMOLOL MALEATE 1 DROP: 5 SOLUTION OPHTHALMIC at 09:21

## 2024-07-10 RX ADMIN — LEVOTHYROXINE SODIUM 25 MCG: 25 TABLET ORAL at 09:21

## 2024-07-10 RX ADMIN — METOPROLOL SUCCINATE 50 MG: 50 TABLET, EXTENDED RELEASE ORAL at 09:21

## 2024-07-10 RX ADMIN — PANTOPRAZOLE SODIUM 40 MG: 40 TABLET, DELAYED RELEASE ORAL at 05:37

## 2024-07-10 RX ADMIN — OXYBUTYNIN CHLORIDE 5 MG: 5 TABLET, EXTENDED RELEASE ORAL at 09:21

## 2024-07-10 RX ADMIN — AMLODIPINE BESYLATE 5 MG: 5 TABLET ORAL at 09:21

## 2024-07-10 RX ADMIN — HYDRALAZINE HYDROCHLORIDE 25 MG: 25 TABLET ORAL at 05:37

## 2024-07-10 RX ADMIN — HYDRALAZINE HYDROCHLORIDE 25 MG: 25 TABLET ORAL at 02:16

## 2024-07-10 NOTE — DISCHARGE PLACEMENT REQUEST
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      A/G Ratio 1.0 g/dL      BUN/Creatinine Ratio 16.3     Anion Gap 9.0 mmol/L      eGFR 13.8 mL/min/1.73      Comment: <15 Indicative of kidney failure       Narrative:      GFR Normal >60  Chronic Kidney Disease <60  Kidney Failure <15    The GFR formula is only valid for adults with stable renal function between ages 18 and 70.    Renal Function Panel [[id number]]  (Abnormal) Collected: 07/08/24 0022    Specimen: Blood Updated: 07/08/24 0101     Glucose 89 mg/dL      BUN 56 mg/dL      Creatinine 3.26 mg/dL      Sodium 139 mmol/L      Potassium 4.4 mmol/L      Comment: Slight hemolysis detected by analyzer. Result may be falsely elevated.        Chloride 107 mmol/L      CO2 21.0 mmol/L      Calcium 9.0 mg/dL      Albumin 3.2 g/dL      Phosphorus 4.4 mg/dL      Anion Gap 11.0 mmol/L      BUN/Creatinine Ratio 17.2     eGFR 14.1 mL/min/1.73      Comment: <15 Indicative of kidney failure       Narrative:      GFR Normal >60  Chronic Kidney Disease <60  Kidney Failure  <15    The GFR formula is only valid for adults with stable renal function between ages 18 and 70.    CBC (No Diff) [[id number]]  (Abnormal) Collected: 07/08/24 0022    Specimen: Blood Updated: 07/08/24 0033     WBC 7.59 10*3/mm3      RBC 2.93 10*6/mm3      Hemoglobin 9.0 g/dL      Hematocrit 28.2 %      MCV 96.2 fL      MCH 30.7 pg      MCHC 31.9 g/dL      RDW 13.1 %      RDW-SD 46.0 fl      MPV 8.8 fL      Platelets 302 10*3/mm3     Renal Function Panel [[id number]]  (Abnormal) Collected: 07/07/24 0408    Specimen: Blood Updated: 07/07/24 0506     Glucose 91 mg/dL      BUN 55 mg/dL      Creatinine 3.39 mg/dL      Sodium 138 mmol/L      Potassium 4.9 mmol/L      Chloride 109 mmol/L      CO2 19.8 mmol/L      Calcium 8.7 mg/dL      Albumin 3.2 g/dL      Phosphorus 4.8 mg/dL      Anion Gap 9.2 mmol/L      BUN/Creatinine Ratio 16.2     eGFR 13.4 mL/min/1.73      Comment: <15 Indicative of kidney failure       Narrative:      GFR Normal >60  Chronic Kidney Disease <60  Kidney Failure <15    The GFR formula is only valid for adults with stable renal function between ages 18 and 70.    Blood Gas, Venous With Co-Ox [[id number]]  (Abnormal) Collected: 07/07/24 0419    Specimen: Venous Blood Updated: 07/07/24 0423     Site Lab     pH, Venous 7.327 pH Units      pCO2, Venous 43.8 mm Hg      pO2, Venous 107.0 mm Hg      Comment: 83 Value above reference range        HCO3, Venous 22.9 mmol/L      Base Excess, Venous -3.0 mmol/L      O2 Saturation, Venous 98.9 %      Comment: 83 Value above reference range        Hemoglobin, Blood Gas 9.7 g/dL      Comment: 84 Value below reference range        CO2 Content 24.3 mmol/L      Barometric Pressure for Blood Gas 727 mmHg      Modality Room Air     FIO2 21 %      Ventilator Mode NA     Collected by [id number]     Comment: Meter: [id number]     :  [id number]        Oxyhemoglobin Venous 97.4 %      Comment: 83 Value above reference range        Carboxyhemoglobin Venous 1.5 %       Methemoglobin Venous 0.0 %     Basic Metabolic Panel [[id number]]  (Abnormal) Collected: 07/06/24 0040    Specimen: Blood Updated: 07/06/24 0132     Glucose 104 mg/dL      BUN 51 mg/dL      Creatinine 4.13 mg/dL      Sodium 140 mmol/L      Potassium 4.6 mmol/L      Chloride 108 mmol/L      CO2 19.9 mmol/L      Calcium 8.6 mg/dL      BUN/Creatinine Ratio 12.3     Anion Gap 12.1 mmol/L      eGFR 10.6 mL/min/1.73      Comment: <15 Indicative of kidney failure       Narrative:      GFR Normal >60  Chronic Kidney Disease <60  Kidney Failure <15    The GFR formula is only valid for adults with stable renal function between ages 18 and 70.    Ferritin [[id number]]  (Normal) Collected: 07/05/24 0411    Specimen: Blood Updated: 07/05/24 0458     Ferritin 77.27 ng/mL     Narrative:      Results may be falsely decreased if patient taking Biotin.      Iron Profile [[id number]]  (Abnormal) Collected: 07/05/24 0411    Specimen: Blood Updated: 07/05/24 0448     Iron 58 mcg/dL      Iron Saturation (TSAT) 20 %      Transferrin 198 mg/dL      TIBC 295 mcg/dL     Blood Gas, Venous With Co-Ox [[id number]]  (Abnormal) Collected: 07/05/24 0416    Specimen: Venous Blood Updated: 07/05/24 0419     Site Lab     pH, Venous 7.352 pH Units      pCO2, Venous 41.0 mm Hg      pO2, Venous 24.8 mm Hg      Comment: 84 Value below reference range        HCO3, Venous 22.7 mmol/L      Base Excess, Venous -2.7 mmol/L      O2 Saturation, Venous 47.9 %      Hemoglobin, Blood Gas 10.0 g/dL      Comment: 84 Value below reference range        CO2 Content 24.0 mmol/L      Barometric Pressure for Blood Gas 724 mmHg      Modality Room Air     FIO2 21 %      Ventilator Mode NA     Collected by [id number]     Comment: Meter: [id number]     :  [id number]        Oxyhemoglobin Venous 47.0 %      Carboxyhemoglobin Venous 1.5 %      Methemoglobin Venous 0.4 %     Protein / Creatinine Ratio, Urine - Urine, Clean Catch [[id number]]  (Abnormal) Collected: 07/03/24  1637    Specimen: Urine, Clean Catch Updated: 07/04/24 0236     Protein/Creatinine Ratio, Urine 1,240.1 mg/G Crea      Creatinine, Urine 40.4 mg/dL      Total Protein, Urine 50.1 mg/dL     CBC Auto Differential [[id number]]  (Abnormal) Collected: 07/04/24 0117    Specimen: Blood Updated: 07/04/24 0126     WBC 7.87 10*3/mm3      RBC 2.90 10*6/mm3      Hemoglobin 9.1 g/dL      Hematocrit 28.4 %      MCV 97.9 fL      MCH 31.4 pg      MCHC 32.0 g/dL      RDW 12.6 %      RDW-SD 45.4 fl      MPV 8.8 fL      Platelets 285 10*3/mm3      Neutrophil % 72.4 %      Lymphocyte % 16.8 %      Monocyte % 8.1 %      Eosinophil % 2.0 %      Basophil % 0.3 %      Immature Grans % 0.4 %      Neutrophils, Absolute 5.70 10*3/mm3      Lymphocytes, Absolute 1.32 10*3/mm3      Monocytes, Absolute 0.64 10*3/mm3      Eosinophils, Absolute 0.16 10*3/mm3      Basophils, Absolute 0.02 10*3/mm3      Immature Grans, Absolute 0.03 10*3/mm3      nRBC 0.0 /100 WBC     Hemoglobin A1c [[id number]]  (Normal) Collected: 07/03/24 1530    Specimen: Blood Updated: 07/03/24 1951     Hemoglobin A1C 5.60 %     Narrative:      Hemoglobin A1C Ranges:    Increased Risk for Diabetes  5.7% to 6.4%  Diabetes                     >= 6.5%  Diabetic Goal                < 7.0%    Fentanyl, Urine - Urine, Clean Catch [[id number]]  (Normal) Collected: 07/03/24 1637    Specimen: Urine, Clean Catch Updated: 07/03/24 1943     Fentanyl, Urine Negative    Narrative:      Negative Threshold:      Fentanyl 5 ng/mL     The normal value for the drug tested is negative. This report includes final unconfirmed screening results to be used for medical treatment purposes only. Unconfirmed results must not be used for non-medical purposes such as employment or legal testing. Clinical consideration should be applied to any drug of abuse test, particularly when unconfirmed results are used.           Urine Drug Screen - Urine, Clean Catch [[id number]]  (Normal) Collected: 07/03/24 1637     Specimen: Urine, Clean Catch Updated: 07/03/24 1941     THC, Screen, Urine Negative     Phencyclidine (PCP), Urine Negative     Cocaine Screen, Urine Negative     Methamphetamine, Ur Negative     Opiate Screen Negative     Amphetamine Screen, Urine Negative     Benzodiazepine Screen, Urine Negative     Tricyclic Antidepressants Screen Negative     Methadone Screen, Urine Negative     Barbiturates Screen, Urine Negative     Oxycodone Screen, Urine Negative     Buprenorphine, Screen, Urine Negative    Narrative:      Cutoff For Drugs Screened:    Amphetamines               500 ng/ml  Barbiturates               200 ng/ml  Benzodiazepines            150 ng/ml  Cocaine                    150 ng/ml  Methadone                  200 ng/ml  Opiates                    100 ng/ml  Phencyclidine               25 ng/ml  THC                         50 ng/ml  Methamphetamine            500 ng/ml  Tricyclic Antidepressants  300 ng/ml  Oxycodone                  100 ng/ml  Buprenorphine               10 ng/ml    The normal value for all drugs tested is negative. This report includes unconfirmed screening results, with the cutoff values listed, to be used for medical treatment purposes only.  Unconfirmed results must not be used for non-medical purposes such as employment or legal testing.  Clinical consideration should be applied to any drug of abuse test, particularly when unconfirmed results are used.      High Sensitivity Troponin T 2Hr [[id number]]  (Abnormal) Collected: 07/03/24 1713    Specimen: Blood Updated: 07/03/24 1735     HS Troponin T 23 ng/L      Troponin T Delta -4 ng/L     Narrative:      High Sensitive Troponin T Reference Range:  <14.0 ng/L- Negative Female for AMI  <22.0 ng/L- Negative Male for AMI  >=14 - Abnormal Female indicating possible myocardial injury.  >=22 - Abnormal Male indicating possible myocardial injury.   Clinicians would have to utilize clinical acumen, EKG, Troponin, and serial changes to determine  if it is an Acute Myocardial Infarction or myocardial injury due to an underlying chronic condition.         Urinalysis, Microscopic Only - Urine, Clean Catch [[id number]]  (Abnormal) Collected: 07/03/24 1637    Specimen: Urine, Clean Catch Updated: 07/03/24 1702     RBC, UA 21-50 /HPF      WBC, UA 3-5 /HPF      Comment: Urine culture not indicated.        Bacteria, UA Trace /HPF      Squamous Epithelial Cells, UA 0-2 /HPF      Hyaline Casts, UA None Seen /LPF      Methodology Automated Microscopy    Urinalysis With Culture If Indicated - Urine, Clean Catch [[id number]]  (Abnormal) Collected: 07/03/24 1637    Specimen: Urine, Clean Catch Updated: 07/03/24 1701     Color, UA Yellow     Appearance, UA Clear     pH, UA 8.0     Specific Gravity, UA 1.011     Glucose, UA Negative     Ketones, UA Negative     Bilirubin, UA Negative     Blood, UA Moderate (2+)     Protein,  mg/dL (2+)     Leuk Esterase, UA Trace     Nitrite, UA Negative     Urobilinogen, UA 0.2 E.U./dL    Narrative:      In absence of clinical symptoms, the presence of pyuria, bacteria, and/or nitrites on the urinalysis result does not correlate with infection.    Protime-INR [[id number]]  (Normal) Collected: 07/03/24 1638    Specimen: Blood Updated: 07/03/24 1656     Protime 13.7 Seconds      INR 1.04    Narrative:      Suggested INR therapeutic range for stable oral anticoagulant therapy:    Low Intensity therapy:   1.5-2.0  Moderate Intensity therapy:   2.0-3.0  High Intensity therapy:   2.5-4.0    aPTT [[id number]]  (Normal) Collected: 07/03/24 1638    Specimen: Blood Updated: 07/03/24 1656     PTT 31.2 seconds     Narrative:      PTT Heparin Therapeutic Range:  45 - 116 seconds      T4, Free [[id number]]  (Normal) Collected: 07/03/24 1530    Specimen: Blood Updated: 07/03/24 1645     Free T4 1.57 ng/dL     Narrative:      Results may be falsely increased if patient taking Biotin.      TSH [[id number]]  (Normal) Collected: 07/03/24 1530    Specimen:  Blood Updated: 07/03/24 1645     TSH 1.930 uIU/mL     BNP [[id number]]  (Normal) Collected: 07/03/24 1530    Specimen: Blood Updated: 07/03/24 1645     proBNP 741.8 pg/mL     Narrative:      This assay is used as an aid in the diagnosis of individuals suspected of having heart failure. It can be used as an aid in the diagnosis of acute decompensated heart failure (ADHF) in patients presenting with signs and symptoms of ADHF to the emergency department (ED). In addition, NT-proBNP of <300 pg/mL indicates ADHF is not likely.    Age Range Result Interpretation  NT-proBNP Concentration (pg/mL:      <50             Positive            >450                   Gray                 300-450                    Negative             <300    50-75           Positive            >900                  Gray                300-900                  Negative            <300      >75             Positive            >1800                  Gray                300-1800                  Negative            <300    High Sensitivity Troponin T [[id number]]  (Abnormal) Collected: 07/03/24 1530    Specimen: Blood Updated: 07/03/24 1645     HS Troponin T 27 ng/L     Narrative:      High Sensitive Troponin T Reference Range:  <14.0 ng/L- Negative Female for AMI  <22.0 ng/L- Negative Male for AMI  >=14 - Abnormal Female indicating possible myocardial injury.  >=22 - Abnormal Male indicating possible myocardial injury.   Clinicians would have to utilize clinical acumen, EKG, Troponin, and serial changes to determine if it is an Acute Myocardial Infarction or myocardial injury due to an underlying chronic condition.         CK [[id number]]  (Normal) Collected: 07/03/24 1530    Specimen: Blood Updated: 07/03/24 1641     Creatine Kinase 79 U/L     C-reactive Protein [[id number]]  (Abnormal) Collected: 07/03/24 1530    Specimen: Blood Updated: 07/03/24 1641     C-Reactive Protein 0.55 mg/dL     Sedimentation Rate [[id number]]  (Abnormal) Collected: 07/03/24  1530    Specimen: Blood Updated: 07/03/24 1625     Sed Rate 37 mm/hr     Magnesium [[id number]]  (Abnormal) Collected: 07/03/24 1530    Specimen: Blood Updated: 07/03/24 1618     Magnesium 2.7 mg/dL     CBC & Differential [[id number]]  (Abnormal) Collected: 07/03/24 1530    Specimen: Blood Updated: 07/03/24 1548    Narrative:      The following orders were created for panel order CBC & Differential.  Procedure                               Abnormality         Status                     ---------                               -----------         ------                     CBC Auto Differential[[id number]]        Abnormal            Final result                 Please view results for these tests on the individual orders.    CBC Auto Differential [[id number]]  (Abnormal) Collected: 07/03/24 1530    Specimen: Blood Updated: 07/03/24 1548     WBC 6.94 10*3/mm3      RBC 3.31 10*6/mm3      Hemoglobin 10.3 g/dL      Hematocrit 32.2 %      MCV 97.3 fL      MCH 31.1 pg      MCHC 32.0 g/dL      RDW 12.7 %      RDW-SD 45.2 fl      MPV 8.9 fL      Platelets 339 10*3/mm3      Neutrophil % 71.9 %      Lymphocyte % 17.9 %      Monocyte % 7.2 %      Eosinophil % 2.3 %      Basophil % 0.4 %      Immature Grans % 0.3 %      Neutrophils, Absolute 4.99 10*3/mm3      Lymphocytes, Absolute 1.24 10*3/mm3      Monocytes, Absolute 0.50 10*3/mm3      Eosinophils, Absolute 0.16 10*3/mm3      Basophils, Absolute 0.03 10*3/mm3      Immature Grans, Absolute 0.02 10*3/mm3      nRBC 0.0 /100 WBC           Orders (last 24 hrs)        Start     Ordered    07/10/24 0900  amLODIPine (NORVASC) tablet 5 mg  Every 24 Hours Scheduled         07/09/24 0854    07/10/24 0859  Discharge patient  Once         07/10/24 0858    07/10/24 0708  [place] Urine Culture Tube - Urine, Clean Catch  Once         07/10/24 0707    07/10/24 0652  POC Glucose Once  PROCEDURE ONCE        Comments: Complete no more than 45 minutes prior to patient eating      07/10/24 0644     07/10/24 0600  Comprehensive Metabolic Panel  Morning Draw         07/09/24 1512    07/10/24 0230  hydrALAZINE (APRESOLINE) tablet 25 mg  Once         07/10/24 0137    07/10/24 0000  amLODIPine (NORVASC) 5 MG tablet  Every 24 Hours Scheduled         07/10/24 0915    07/10/24 0000  hydrALAZINE (APRESOLINE) 25 MG tablet  Every 8 Hours Scheduled         07/10/24 0915    07/10/24 0000  Discharge Follow-up with PCP         07/10/24 0915    07/10/24 0000  Discharge Follow-up with Specialty: Nephrology; 2 Weeks         07/10/24 0915    07/10/24 0000  Ambulatory Referral to Urology        Comments: Persistent microscopic hematuria with nonobstructive nephrolithiasis    07/10/24 0915    07/10/24 0000  atorvastatin (LIPITOR) 10 MG tablet  Daily         07/10/24 0915    07/10/24 0000  Ambulatory Referral to [facility] ([facility])         07/10/24 1008    07/09/24 2200  hydrALAZINE (APRESOLINE) tablet 25 mg  Every 8 Hours Scheduled         07/09/24 1402    07/09/24 1933  POC Glucose Once  PROCEDURE ONCE        Comments: Complete no more than 45 minutes prior to patient eating      07/09/24 1927    07/09/24 1614  POC Glucose Once  PROCEDURE ONCE        Comments: Complete no more than 45 minutes prior to patient eating      07/09/24 1607    07/09/24 1402  Urinalysis With Microscopic - Urine, Clean Catch  STAT         07/09/24 1402    07/09/24 1402  Urinalysis without microscopic (no culture) - Urine, Clean Catch  PROCEDURE ONCE         07/09/24 1402    07/09/24 1402  Urinalysis, Microscopic Only - Urine, Clean Catch  PROCEDURE ONCE         07/09/24 1402    07/08/24 1400  hydrALAZINE (APRESOLINE) tablet 10 mg  Every 8 Hours Scheduled,   Status:  Discontinued         07/08/24 0839    07/08/24 1100  lactated ringers infusion  Continuous,   Status:  Discontinued         07/08/24 1013    07/08/24 0915  ipratropium-albuterol (DUO-NEB) nebulizer solution 3 mL  Every 6 Hours PRN         07/08/24 0907    07/04/24 2100  aspirin EC tablet  "325 mg  Nightly         07/04/24 0920    07/04/24 2100  montelukast (SINGULAIR) tablet 10 mg  Nightly         07/04/24 0920    07/04/24 1015  levothyroxine (SYNTHROID, LEVOTHROID) tablet 25 mcg  Daily         07/04/24 0920 07/04/24 1015  metoprolol succinate XL (TOPROL-XL) 24 hr tablet 50 mg  Daily         07/04/24 0920    07/04/24 1015  pantoprazole (PROTONIX) EC tablet 40 mg  Every Early Morning         07/04/24 0920 07/04/24 1015  oxybutynin XL (DITROPAN-XL) 24 hr tablet 5 mg  Daily         07/04/24 0920 07/04/24 1015  atorvastatin (LIPITOR) tablet 10 mg  Daily         07/04/24 0920 07/04/24 1015  timolol (TIMOPTIC) 0.5 % ophthalmic solution 1 drop  2 Times Daily         07/04/24 0920 07/04/24 0800  Oral Care  2 Times Daily       07/03/24 2032 07/03/24 2200  POC Glucose 4x Daily Before Meals & at Bedtime  4 Times Daily Before Meals & at Bedtime      Comments: Complete no more than 45 minutes prior to patient eating      07/03/24 1936 07/03/24 2130  sodium chloride 0.9 % flush 10 mL  Every 12 Hours Scheduled         07/03/24 2032 07/03/24 2130  heparin (porcine) 5000 UNIT/ML injection 5,000 Units  Every 12 Hours Scheduled         07/03/24 2032 07/03/24 2130  sennosides-docusate (PERICOLACE) 8.6-50 MG per tablet 2 tablet  2 Times Daily        Placed in \"And\" Linked Group    07/03/24 2032 07/03/24 2100  Insulin Lispro (humaLOG) injection 2-7 Units  4 Times Daily Before Meals & Nightly         07/03/24 1936 07/03/24 2033  Daily Weights  Daily       07/03/24 2032 07/03/24 2032  sodium chloride 0.9 % flush 10 mL  As Needed         07/03/24 2032 07/03/24 2032  sodium chloride 0.9 % infusion 40 mL  As Needed         07/03/24 2032 07/03/24 2032  nitroglycerin (NITROSTAT) SL tablet 0.4 mg  Every 5 Minutes PRN         07/03/24 2032 07/03/24 2032  polyethylene glycol (MIRALAX) packet 17 g  Daily PRN        Placed in \"And\" Linked Group    07/03/24 2032 07/03/24 2032  bisacodyl " "(DULCOLAX) EC tablet 5 mg  Daily PRN        Placed in \"And\" Linked Group    07/03/24 2032 07/03/24 2032  bisacodyl (DULCOLAX) suppository 10 mg  Daily PRN        Placed in \"And\" Linked Group    07/03/24 2032 07/03/24 2019  hydrALAZINE (APRESOLINE) tablet 10 mg  Every 8 Hours PRN         07/03/24 2019 07/03/24 2000  ondansetron (ZOFRAN) injection 4 mg  Every 6 Hours PRN         07/03/24 2000 07/03/24 1935  dextrose (D50W) (25 g/50 mL) IV injection 25 g  Every 15 Minutes PRN         07/03/24 1936 07/03/24 1935  glucagon HCl (Diagnostic) injection 1 mg  Every 15 Minutes PRN         07/03/24 1936 07/03/24 1935  dextrose (GLUTOSE) oral gel 15 g  Every 15 Minutes PRN         07/03/24 1936    Unscheduled  Follow Hypoglycemia Standing Orders For Blood Glucose <70 & Notify Provider of Treatment  As Needed      Comments: Follow Hypoglycemia Orders As Outlined in Process Instructions (Open Order Report to View Full Instructions)  Notify Provider Any Time Hypoglycemia Treatment is Administered    07/03/24 1936    Unscheduled  Up With Assistance  As Needed       07/03/24 2032    Unscheduled  Oxygen Therapy- Nasal Cannula; Titrate 1-6 LPM Per SpO2; 90 - 95%  Continuous PRN       07/04/24 0212    --  alendronate (FOSAMAX) 70 MG tablet  Every 7 Days         07/03/24 2150    --  captopril (CAPOTEN) 12.5 MG tablet  Daily         07/03/24 2150    --  levothyroxine (SYNTHROID, LEVOTHROID) 25 MCG tablet  Daily         07/03/24 2150    --  linagliptin (TRADJENTA) 5 MG tablet tablet  Daily         07/03/24 2150    --  metFORMIN (GLUCOPHAGE) 500 MG tablet  2 Times Daily With Meals         07/03/24 2150    --  metoprolol succinate XL (TOPROL-XL) 100 MG 24 hr tablet  Daily         07/03/24 2150    --  montelukast (SINGULAIR) 10 MG tablet  Nightly         07/03/24 2150    --  omeprazole (priLOSEC) 40 MG capsule  Daily         07/03/24 2150    --  oxybutynin XL (DITROPAN-XL) 5 MG 24 hr tablet  Daily         07/03/24 2150    " --  simvastatin (ZOCOR) 20 MG tablet  Nightly         24    --  timolol (TIMOPTIC) 0.5 % ophthalmic solution  2 Times Daily         24    --  torsemide (DEMADEX) 20 MG tablet  2 Times Daily         24    --  aspirin 325 MG EC tablet  Every Night at Bedtime         24 2246                     Physician Progress Notes (most recent note)        Ti Sewell PA-C at 24 [id number]       Attestation signed by Patrick Taveras DO at 24 1916    Discussed briefly with nephrology today and obtaining repeat UA today as some concern for hematuria and if persistent on UA planning for consideration of renal biopsy.  IV fluids stopped in the interim and we will follow-up on this with repeat BMP in the a.m.  Patient otherwise medically stable and able to be discharged once nephrology evaluation complete.    I have reviewed this documentation and agree.                  Patient Identification:  Name:  Aggie Perkins  Age:  79 y.o.  Sex:  female  :  1945  MRN:  [id number]  Visit Number:  [id number]  Primary Care Provider:  Sarah Samuel APRN    Length of stay:  6    Subjective/Interval History/Consultants/Procedures     Chief Complaint:   Chief Complaint   Patient presents with    Abnormal Lab       Subjective/Interval History:    79 y.o. female who was admitted on 7/3/2024 with acute on chronic renal failure     PMH is significant for CKD stage IV, T2DM, HTN, HLD, HFpEF, cognitive impairment   For complete admission information, please see history and physical.     Consultations:  Nephrology   PT  CM    Procedures/Scans:  CXR x 2  CT abdomen and pelvis wo contrast    Today, the patient was seen and examined with family at bedside. She was sitting in bedside chair reporting she felt well today. No change in UOP noted. Discussed BP regimen. No new complaints noted.      Room location at the time of evaluation was  [address]b.    ----------------------------------------------------------------------------------------------------------------------  [facility] Meds:  [START ON 7/10/2024] amLODIPine, 5 mg, Oral, Q24H  aspirin, 325 mg, Oral, Nightly  atorvastatin, 10 mg, Oral, Daily  heparin (porcine), 5,000 Units, Subcutaneous, Q12H  hydrALAZINE, 25 mg, Oral, Q8H  insulin lispro, 2-7 Units, Subcutaneous, 4x Daily AC & at Bedtime  levothyroxine, 25 mcg, Oral, Daily  metoprolol succinate XL, 50 mg, Oral, Daily  montelukast, 10 mg, Oral, Nightly  oxybutynin XL, 5 mg, Oral, Daily  pantoprazole, 40 mg, Oral, Q AM  senna-docusate sodium, 2 tablet, Oral, BID  sodium chloride, 10 mL, Intravenous, Q12H  timolol, 1 drop, Both Eyes, BID         ----------------------------------------------------------------------------------------------------------------------      Objective     Vital Signs:  Temp:  [97.1 °F (36.2 °C)-99 °F (37.2 °C)] 98.1 °F (36.7 °C)  Heart Rate:  [69-78] 70  Resp:  [18] 18  BP: (129-185)/(66-81) 156/66      07/07/24  0500 07/08/24  0500 07/09/24  0544   Weight: 62.9 kg (138 lb 9.6 oz) 62.9 kg (138 lb 9.6 oz) 66.4 kg (146 lb 6.2 oz)     Body mass index is 31.68 kg/m².    Intake/Output Summary (Last 24 hours) at 7/9/2024 1457  Last data filed at 7/9/2024 1456  Gross per 24 hour   Intake 870 ml   Output 1400 ml   Net -530 ml     I/O this shift:  In: 870 [P.O.:870]  Out: 400 [Urine:400]  Diet: Cardiac, Diabetic, Renal; Healthy Heart (2-3 Na+); Consistent Carbohydrate; Low Sodium (2-3g), Low Potassium, Low Phosphorus; Fluid Consistency: Thin (IDDSI 0)  ----------------------------------------------------------------------------------------------------------------------    Physical Exam  Vitals and nursing note reviewed.   Constitutional:       General: She is not in acute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

## 2024-07-10 NOTE — PROGRESS NOTES
"Nephrology Progress Note      Subjective     No chest pain, shortness of breath.  Patient has mild generalized weakness otherwise doing well.    Objective       Vital signs :     Temp:  [97.5 °F (36.4 °C)-98.1 °F (36.7 °C)] 98.1 °F (36.7 °C)  Heart Rate:  [59-83] 69  Resp:  [18] 18  BP: (150-188)/(66-83) 174/67    Intake/Output                         07/08/24 0701 - 07/09/24 0700 07/09/24 0701 - 07/10/24 0700     0853-0538 9500-5010 Total 6762-2632 3428-1232 Total                 Intake    P.O.  480  -- 480  990  240 1230    Total Intake 480 -- 480        Output    Urine  1300  -- 1300  400  1500 1900    Total Output 1300 -- 5355 403 6693 1900             Physical Exam:    General Appearance : Not in acute distress  Lungs : clear to auscultation, respirations regular  Heart :  regular rhythm & normal rate, normal S1, S2 and no murmur, no rub  Abdomen : Soft, nondistended  Extremities : No edema,   Neurologic :   orientated to person, place, time and situation, Grossly no focal deficits        Laboratory Data :     Albumin Albumin   Date Value Ref Range Status   07/10/2024 3.3 (L) 3.5 - 5.2 g/dL Final   07/09/2024 3.4 (L) 3.5 - 5.2 g/dL Final   07/08/2024 3.2 (L) 3.5 - 5.2 g/dL Final      Magnesium No results found for: \"MG\"       PTH               No results found for: \"PTH\"    CBC and coagulation:  Results from last 7 days   Lab Units 07/08/24  0022 07/04/24  0117 07/03/24  1638 07/03/24  1530   SED RATE mm/hr  --   --   --  37*   CRP mg/dL  --   --   --  0.55*   WBC 10*3/mm3 7.59 7.87  --  6.94   HEMOGLOBIN g/dL 9.0* 9.1*  --  10.3*   HEMATOCRIT % 28.2* 28.4*  --  32.2*   MCV fL 96.2 97.9*  --  97.3*   MCHC g/dL 31.9 32.0  --  32.0   PLATELETS 10*3/mm3 302 285  --  339   INR   --   --  1.04  --      Acid/base balance:      Renal and electrolytes:    Results from last 7 days   Lab Units 07/10/24  0025 07/09/24  0050 07/08/24  0022 07/07/24  0408 07/06/24  0040 07/05/24  0411 07/03/24  1925 " 07/03/24  1530   SODIUM mmol/L 138 140 139 138 140 142   < > 138   POTASSIUM mmol/L 4.1 4.7 4.4 4.9 4.6 5.0   < > 6.6*   MAGNESIUM mg/dL  --   --   --   --   --   --   --  2.7*   CHLORIDE mmol/L 104 108* 107 109* 108* 110*   < > 99   CO2 mmol/L 22.5 23.0 21.0* 19.8* 19.9* 20.5*   < > 24.6   BUN mg/dL 49* 54* 56* 55* 51* 56*   < > 78*   CREATININE mg/dL 2.84* 3.32* 3.26* 3.39* 4.13* 4.70*   < > 5.28*   CALCIUM mg/dL 9.3 9.1 9.0 8.7 8.6 8.3*   < > 8.7   PHOSPHORUS mg/dL  --   --  4.4 4.8*  --  4.6*  --   --     < > = values in this interval not displayed.     Estimated Creatinine Clearance: 13.9 mL/min (A) (by C-G formula based on SCr of 2.84 mg/dL (H)).  @GFRCG:3@   Liver and pancreatic function:  Results from last 7 days   Lab Units 07/10/24  0025 07/09/24  0050 07/08/24  0022 07/05/24  0411 07/04/24  0117   ALBUMIN g/dL 3.3* 3.4* 3.2*   < > 3.1*   BILIRUBIN mg/dL 0.3 0.3  --   --  0.2   ALK PHOS U/L 95 92  --   --  59   AST (SGOT) U/L 46* 72*  --   --  11   ALT (SGPT) U/L 47* 41*  --   --  <5    < > = values in this interval not displayed.         Cardiac:  Results from last 7 days   Lab Units 07/03/24  1530   PROBNP pg/mL 741.8     Liver and pancreatic function:  Results from last 7 days   Lab Units 07/10/24  0025 07/09/24  0050 07/08/24  0022 07/05/24 0411 07/04/24  0117   ALBUMIN g/dL 3.3* 3.4* 3.2*   < > 3.1*   BILIRUBIN mg/dL 0.3 0.3  --   --  0.2   ALK PHOS U/L 95 92  --   --  59   AST (SGOT) U/L 46* 72*  --   --  11   ALT (SGPT) U/L 47* 41*  --   --  <5    < > = values in this interval not displayed.       Medications :     amLODIPine, 5 mg, Oral, Q24H  aspirin, 325 mg, Oral, Nightly  atorvastatin, 10 mg, Oral, Daily  heparin (porcine), 5,000 Units, Subcutaneous, Q12H  hydrALAZINE, 25 mg, Oral, Q8H  insulin lispro, 2-7 Units, Subcutaneous, 4x Daily AC & at Bedtime  levothyroxine, 25 mcg, Oral, Daily  metoprolol succinate XL, 50 mg, Oral, Daily  montelukast, 10 mg, Oral, Nightly  oxybutynin XL, 5 mg, Oral,  Daily  pantoprazole, 40 mg, Oral, Q AM  senna-docusate sodium, 2 tablet, Oral, BID  sodium chloride, 10 mL, Intravenous, Q12H  timolol, 1 drop, Both Eyes, BID             Assessment & Plan     -Acute kidney injury  - Chronic kidney disease stage IV  - Hematuria  - Uncontrolled hypertension  - Congestive heart failure, congestive heart failure with preserved ejection fraction  - 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  Repeat urine analysis has persistent hematuria that is likely due to nephrolithiasis.  BO on CKD most likely due to dynamic mediated due to relative hypotension.  Captopril is being hold.  Other differentials include glomerulonephritis given significant hematuria.    Uncontrolled hypertension slightly better continue on hydralazine with current dose  -Urology follow-up as an outpatient for nephrolithiasis  - Patient can be discharged home from nephrology standpoint    Educated and counseled the patient, she was agreeable with the plan.    Reviewed clinical notes, reviewed labs and evaluated radiological data.  Discussed the case in detail with primary team        Talia Mcdaniel MD  07/10/24  07:24 EDT

## 2024-07-10 NOTE — PLAN OF CARE
Goal Outcome Evaluation:     Patient resting in bed. Bp elevated this shift, see MAR. No visible indicators of acute distress noted. Plan of care ongoing.

## 2024-07-10 NOTE — CASE MANAGEMENT/SOCIAL WORK
Discharge Planning Assessment   [facility]     Patient Name: Aggie Perkins  MRN: [id number]  Today's Date: 7/10/2024    Admit Date: 7/3/2024    Plan: SS spoke with pt at bedside.  Pt stated preference for [facility].  SS made referral to [facility]  at [phone] and faxed pt information to [phone].  SS will follow.       Discharge Plan       Row Name 07/10/24 1110       Plan    Final Discharge Disposition Code 06 - home with home health care    Final Note [facility] per Donna received referral.  Pt to be discharged home.  Pt stated she has family available to transport via private auto.      Row Name 07/10/24 3344       Plan    Plan SS spoke with pt at bedside.  Pt stated preference for [facility].  SS made referral to [facility]  at [phone] and faxed pt information to [phone].  SS will follow.                  Continued Care and Services - Admitted Since 7/3/2024       Home Medical Care Coordination complete.      Service Provider Request Status Selected Services Address Phone Fax Patient Preferred    [facility] [address] [facility], [address] [address] [phone] --                  Expected Discharge Date and Time       Expected Discharge Date Expected Discharge Time    Jul 10, 2024             KAREEM RecinosW

## 2024-07-10 NOTE — NURSING NOTE
Pt being discharged home today on room air with an order of HH. MAXIMINO Schmidt made aware the discharge is complete. Family to transport.         Update: 1417  Waiting on meds to beds at this time.

## 2024-07-10 NOTE — DISCHARGE SUMMARY
[facility] HOSPITALIST DISCHARGE SUMMARY    Patient Identification:  Name:  Aggie Perkins  Age:  79 y.o.  Sex:  female  :  1945  MRN:  [id number]  Visit Number:  [id number]    Date of Admission: 7/3/2024  Date of Discharge:  07/10/24     PCP: Sarah Samuel APRN    Discharging Provider: Kip Sewell PA-C / Dr. Taveras    Discharge Diagnoses     Discharge Diagnoses:  BO on CKD stage IV, improving  Hyperkalemia   Poorly controlled HTN, improving    Secondary Diagnoses:  T2DM  HFpEF  HLD  Hypothyroidism     Needs on follow up:  Nephrology, urology, PCP  Consults/Procedures     Consults:   Consults       Date and Time Order Name Status Description    7/3/2024  8:32 PM Inpatient Nephrology Consult              Procedures/Scans Performed:  CXR x 2  CT abdomen and pelvis wo contrast       History of Presenting Illness     Chief Complaint   Patient presents with   • Abnormal Lab       Patient is a 79 y.o. female who presented to [facility] complaining of abnormal lab.  Please see the admitting history and physical for further details.      Hospital Course     Patient was admitted to [facility] [place] on  7/3/24 following presentation to [facility] [place] at the direction of outpatient provider for abnormal labs. Patient with recently diagnosed CKD stage IV and had been following as an outpatient with nephrology. She reported progressively worsening weakness over the previous few months. She also complained of poor appetite and occasional nausea but denied any current vomiting or diarrhea. She reported some dyspnea on exertion but no cough or chest pain. No edema. Work up in the ED found potassium elevated at 6.6 BUN was 78 with creatinine 5.28 and GFR 7.9.  Magnesium was 2.7.  UA showed moderate blood, trace leukocytes and trace bacteria.  Patient received 1 L fluid bolus and treatment for hyperkalemia per protocol in the ED.  She was admitted to the telemetry unit for further workup and management.    Nephrology was  consulted and followed.  Patient was noted to be taking potentially nephrotoxic agents at home including captopril, metformin, torsemide which were held on admission.  Given metabolic acidosis half-normal saline with bicarb infusion continued initially and as acidosis improving did transition to the LR infusion later in admission.  Potassium had remained stable following initial therapy.  Ultimately with fluid replacement and avoidance of nephrotoxins patient's renal function has slowly improved with creatinine 2.84 on the date of discharge-similar to levels from March of this year.  Etiology of acute kidney injury remains uncertain at this time-nephrology considering ischemic hypertensive nephrosclerosis versus diabetic nephropathy among others.  Given hematuria on UA and nephrolithiasis noted on CT imaging nephrology did recommend urology referral postdischarge. Given patient improving and otherwise clinically stable after further discussion with nephrology and no further inpatient workup planned patient was felt to have reached the maximum benefit of the current hospitalization.  She will be scheduled to follow-up with nephrology in 2 weeks for further management.  With patient's home captopril and torsemide held on admission we have been titrating new antihypertensives during this admission.  Patient will be discharged on amlodipine at 5 mg daily and hydralazine at 25 mg 3 times daily. As volume status remaining grossly stable with continue to hold torsemide.  Home metoprolol has been continued at previous dose.  Given A1c very well-controlled and creatinine clearance 13.9 on the date of discharge metformin will remain held.  Will continue patient's Tradjenta 5 mg and recommend further follow-up with PCP- she will be scheduled for routine hospital follow-up in 1 week.  PT and OT did follow with the patient during this admission given reports of recent progressively worsening weakness.  Patient will benefit from  further assistance from home health with PT OT at discharge.    Discharge Vitals/Physical Examination     Vital Signs:  Temp:  [97.5 °F (36.4 °C)-98.1 °F (36.7 °C)] 98.1 °F (36.7 °C)  Heart Rate:  [59-83] 69  Resp:  [18] 18  BP: (150-188)/(66-83) 174/67  Mean Arterial Pressure (Non-Invasive) for the past 24 hrs (Last 3 readings):   Noninvasive MAP (mmHg)   07/10/24 0641 84   07/10/24 0330 128   07/10/24 0134 156     SpO2 Percentage    07/09/24 2317 07/10/24 0330 07/10/24 0641   SpO2: 97% 96% 95%     SpO2:  [95 %-99 %] 95 %  on   ;   Device (Oxygen Therapy): room air    Body mass index is 30.84 kg/m².  Wt Readings from Last 3 Encounters:   07/10/24 64.6 kg (142 lb 8 oz)   05/02/24 66.4 kg (146 lb 6.4 oz)   10/25/23 67.4 kg (148 lb 9.6 oz)         Physical Exam:  Physical Exam  Vitals and nursing note reviewed.   Constitutional:       General: She is not in acute distress.     Appearance: She is obese.   HENT:      Head: Normocephalic and atraumatic.   Eyes:      Extraocular Movements: Extraocular movements intact.   Cardiovascular:      Rate and Rhythm: Normal rate.   Pulmonary:      Effort: Pulmonary effort is normal.   Abdominal:      Palpations: Abdomen is soft.   Musculoskeletal:      Right lower leg: No edema.      Left lower leg: No edema.   Skin:     General: Skin is warm and dry.   Neurological:      Mental Status: She is alert. Mental status is at baseline.   Psychiatric:         Mood and Affect: Mood normal.         Behavior: Behavior normal.         Pertinent Laboratory/Radiology Results     Pertinent Laboratory Results:  Results from last 7 days   Lab Units 07/03/24  1713 07/03/24  1530   CK TOTAL U/L  --  79   HSTROP T ng/L 23* 27*     Results from last 7 days   Lab Units 07/03/24  1530   PROBNP pg/mL 741.8         Results from last 7 days   Lab Units 07/08/24  0022 07/04/24  0117 07/03/24  1638 07/03/24  1530   CRP mg/dL  --   --   --  0.55*   WBC 10*3/mm3 7.59 7.87  --  6.94   HEMOGLOBIN g/dL 9.0* 9.1*  " --  10.3*   HEMATOCRIT % 28.2* 28.4*  --  32.2*   MCV fL 96.2 97.9*  --  97.3*   MCHC g/dL 31.9 32.0  --  32.0   PLATELETS 10*3/mm3 302 285  --  339   INR   --   --  1.04  --      Results from last 7 days   Lab Units 07/10/24  0025 07/09/24  0050 07/08/24  0022 07/05/24  0411 07/04/24  0117 07/03/24  1925 07/03/24  1530   SODIUM mmol/L 138 140 139   < > 139   < > 138   POTASSIUM mmol/L 4.1 4.7 4.4   < > 5.1   < > 6.6*   MAGNESIUM mg/dL  --   --   --   --   --   --  2.7*   CHLORIDE mmol/L 104 108* 107   < > 105   < > 99   CO2 mmol/L 22.5 23.0 21.0*   < > 22.6   < > 24.6   BUN mg/dL 49* 54* 56*   < > 71*   < > 78*   CREATININE mg/dL 2.84* 3.32* 3.26*   < > 5.01*   < > 5.28*   CALCIUM mg/dL 9.3 9.1 9.0   < > 7.4*   < > 8.7   GLUCOSE mg/dL 107* 92 89   < > 133*   < > 89   ALBUMIN g/dL 3.3* 3.4* 3.2*   < > 3.1*   < > 3.9   BILIRUBIN mg/dL 0.3 0.3  --   --  0.2   < > 0.3   ALK PHOS U/L 95 92  --   --  59   < > 72   AST (SGOT) U/L 46* 72*  --   --  11   < > 11   ALT (SGPT) U/L 47* 41*  --   --  <5   < > 5    < > = values in this interval not displayed.   Estimated Creatinine Clearance: 13.9 mL/min (A) (by C-G formula based on SCr of 2.84 mg/dL (H)).  No results found for: \"AMMONIA\"    Glucose   Date/Time Value Ref Range Status   07/10/2024 0644 108 70 - 130 mg/dL Final   07/09/2024 1927 198 (H) 70 - 130 mg/dL Final   07/09/2024 1607 143 (H) 70 - 130 mg/dL Final   07/09/2024 1009 187 (H) 70 - 130 mg/dL Final   07/09/2024 0624 116 70 - 130 mg/dL Final   07/08/2024 1938 208 (H) 70 - 130 mg/dL Final   07/08/2024 1637 132 (H) 70 - 130 mg/dL Final   07/08/2024 1108 166 (H) 70 - 130 mg/dL Final     Lab Results   Component Value Date    HGBA1C 5.60 07/03/2024     Lab Results   Component Value Date    TSH 1.930 07/03/2024    FREET4 1.57 07/03/2024       No results found for: \"BLOODCX\"  No results found for: \"URINECX\"  No results found for: \"WOUNDCX\"  No results found for: \"STOOLCX\"  No results found for: \"RESPCX\"  Pain Management " Panel  More data may exist         Latest Ref Rng & Units 7/3/2024 3/21/2024   Pain Management Panel   Creatinine, Urine mg/dL 40.4  74.0  74.0    Amphetamine, Urine Qual Negative Negative  -   Barbiturates Screen, Urine Negative Negative  -   Benzodiazepine Screen, Urine Negative Negative  -   Buprenorphine, Screen, Urine Negative Negative  -   Cocaine Screen, Urine Negative Negative  -   Fentanyl, Urine Negative Negative  -   Methadone Screen , Urine Negative Negative  -   Methamphetamine, Ur Negative Negative  -       Pertinent Radiology Results:  Imaging Results (All)       Procedure Component Value Units Date/Time    XR Chest 1 View [[id number]] Collected: 07/07/24 0624     Updated: 07/07/24 0627    Narrative:      PROCEDURE: Portable chest x-ray examination performed on July 7, 2024.  Single view. Supine position.     HISTORY: Shortness of breath.     COMPARISON: None.     FINDINGS:     Heart size at the upper limits of normal.  No lobar consolidation or edema.   No pleural effusion or pneumothorax.  No fracture or foreign body.       Impression:         1.  Heart size at the upper limits of normal.  2.  No lobar consolidation or edema.  3.  No pleural effusion or pneumothorax.  4.  No free air in the upper abdomen.  5.  Slightly elevated right hemidiaphragm.     This report was finalized on 7/7/2024 6:25 AM by Amandeep Saleh MD.       CT Abdomen Pelvis Without Contrast [[id number]] Collected: 07/03/24 1955     Updated: 07/03/24 1959    Narrative:      INDICATION: Abdominal pain.     COMPARISON: No relevant priors available.     TECHNIQUE: Axial CT images of the abdomen and pelvis were obtained  without IV contrast administration. Coronal and sagittal reformations  were reviewed.     FINDINGS:  . Calcified granulomas in the lung bases.     The liver, gallbladder, spleen, pancreas and adrenal glands appear  unremarkable. The gallbladder is surgically absent. Air in the biliary  tree likely relates to prior  biliary intervention.     8 mm left renal calculus. No hydronephrosis. Nonspecific bilateral  perinephric fat stranding. Fat-containing umbilical hernia.     No evidence of bowel obstruction/colitis/appendicitis. No free air. No  drainable fluid collection.     The urinary bladder appears unremarkable. Fat-containing bilateral  inguinal hernias.     Mild degenerative changes in the spine. No acute osseous abnormality  evident.       Impression:      1.  No acute process in the abdomen or pelvis.  2.  Nonobstructing left renal calculus.  3.  Additional findings as above.        This report was finalized on 7/3/2024 7:57 PM by Alex Pallas, DO.       XR Chest 1 View [[id number]] Collected: 07/03/24 1720     Updated: 07/03/24 1722    Narrative:      XR CHEST 1 VW-     CLINICAL INDICATION: sob        COMPARISON: None immediately available      TECHNIQUE: Single frontal view of the chest.     FINDINGS:     LUNGS: Lungs are adequately aerated.      HEART AND MEDIASTINUM: Heart and mediastinal contours are unremarkable        SKELETON: Bony and soft tissue structures are unremarkable.             Impression:      No radiographic evidence of acute cardiac or pulmonary disease.           This report was finalized on 7/3/2024 5:20 PM by Dr. Connor Sy MD.               Discharge Disposition/Discharge Medications/Discharge Appointments     Discharge Disposition:   Home-Health Care [facility] [place] [place]    Condition at Discharge:  Stable    Discharge Medications:     Your medication list        START taking these medications        Instructions Last Dose Given Next Dose Due   amLODIPine 5 MG tablet  Commonly known as: NORVASC      Take 1 tablet by mouth Daily for 30 days.       atorvastatin 10 MG tablet  Commonly known as: LIPITOR  Replaces: simvastatin 20 MG tablet      Take 1 tablet by mouth Daily for 30 days.       hydrALAZINE 25 MG tablet  Commonly known as: APRESOLINE      Take 1 tablet by mouth Every 8 (Eight) Hours for 30 days.               CONTINUE taking these medications        Instructions Last Dose Given Next Dose Due   alendronate 70 MG tablet  Commonly known as: FOSAMAX      Take 1 tablet by mouth Every 7 (Seven) Days.       aspirin 325 MG EC tablet      Take 1 tablet by mouth every night at bedtime.       levothyroxine 25 MCG tablet  Commonly known as: SYNTHROID, LEVOTHROID      Take 1 tablet by mouth Daily.       linagliptin 5 MG tablet tablet  Commonly known as: TRADJENTA      Take 1 tablet by mouth Daily.       metoprolol succinate  MG 24 hr tablet  Commonly known as: TOPROL-XL      Take 1 tablet by mouth Daily.       montelukast 10 MG tablet  Commonly known as: SINGULAIR      Take 1 tablet by mouth Every Night.       omeprazole 40 MG capsule  Commonly known as: priLOSEC      Take 1 capsule by mouth Daily.       oxybutynin XL 5 MG 24 hr tablet  Commonly known as: DITROPAN-XL      Take 1 tablet by mouth Daily.       timolol 0.5 % ophthalmic solution  Commonly known as: TIMOPTIC      Administer 1 drop to both eyes 2 (Two) Times a Day.              STOP taking these medications      captopril 12.5 MG tablet  Commonly known as: CAPOTEN        metFORMIN 500 MG tablet  Commonly known as: GLUCOPHAGE        simvastatin 20 MG tablet  Commonly known as: ZOCOR  Replaced by: atorvastatin 10 MG tablet        torsemide 20 MG tablet  Commonly known as: DEMADEX                  Where to Get Your Medications        These medications were sent to [address] [place] [address]      Hours: Monday to Friday 7 AM to 6 PM Phone: [phone]   amLODIPine 5 MG tablet  atorvastatin 10 MG tablet  hydrALAZINE 25 MG tablet          Discharge Diet:  Renal, diabetic    Discharge Activity:  as tolerated      Time spent on this discharge exceeded 30 minutes.

## 2024-07-10 NOTE — PAYOR COMM NOTE
"CONTACT: CHRISTI LARSEN RN  UTILIZATION MANAGEMENT DEPT.  [facility]  [address] [place] [address]  PHONE: [phone]  FAX: [phone]        DISCHARGE NOTIFICATION  DC DATE: 7/10/24 TO HOME WITH HOME HEALTH    REF#[id number]     Kingston Perkins (79 y.o. Female)       Date of Birth   1945    Social Security Number       [place]   [address] [place] [address]    Home Phone   [phone]    MRN   [id number]       [place]   Non-Anglican    Marital Status   Single                            Admission Date   7/3/24    Admission Type   Emergency    Admitting Provider   Romeo Dubois MD    Attending Provider       Department, Room/Bed   [facility], 3311/2S       Discharge Date   7/10/2024    Discharge Disposition   Home-Health Care Sv    Discharge Destination   Other                              Attending Provider: (none)   Allergies: No Known Allergies    Isolation: None   Infection: None   Code Status: CPR    Ht: 144.8 cm (57\")   Wt: 64.6 kg (142 lb 8 oz)    Admission Cmt: None   Principal Problem: Acute on chronic renal failure [N17.9,N18.9]                   Active Insurance as of 7/3/2024       Primary Coverage       Payor Plan Insurance Group Employer/Plan Group    HUMANA MEDICARE REPLACEMENT HUMANA MED ADV PPO [id number]       Payor Plan Address Payor Plan Phone Number Payor Plan Fax Number Effective Dates    [address] [address] [phone]  1/1/2022 - None Entered    [facility] [address]         Subscriber Name Subscriber Birth Date Member ID       KINGSTON PERKINS 1945 [id number]                     Emergency Contacts        (Rel.) Home Phone Work Phone Mobile Phone    Jeanette Valerio (Sister) [phone] -- --                 Discharge Summary        Ti Sewell PA-C at 07/10/24 1025              [facility] HOSPITALIST DISCHARGE SUMMARY    Patient Identification:  Name:  Kingston Perkins  Age:  79 y.o.  Sex:  " female  :  1945  MRN:  [id number]  Visit Number:  [id number]    Date of Admission: 7/3/2024  Date of Discharge:  07/10/24     PCP: Sarah Samuel APRN    Discharging Provider: Kip Sewell PA-C / Dr. Taveras    Discharge Diagnoses     Discharge Diagnoses:  BO on CKD stage IV, improving  Hyperkalemia   Poorly controlled HTN, improving    Secondary Diagnoses:  T2DM  HFpEF  HLD  Hypothyroidism     Needs on follow up:  Nephrology, urology, PCP  Consults/Procedures     Consults:   Consults       Date and Time Order Name Status Description    7/3/2024  8:32 PM Inpatient Nephrology Consult              Procedures/Scans Performed:  CXR x 2  CT abdomen and pelvis wo contrast       History of Presenting Illness     Chief Complaint   Patient presents with    Abnormal Lab       Patient is a 79 y.o. female who presented to [facility] complaining of abnormal lab.  Please see the admitting history and physical for further details.      Hospital Course     Patient was admitted to [facility] on  7/3/24 following presentation to [facility] at the direction of outpatient provider for abnormal labs. Patient with recently diagnosed CKD stage IV and had been following as an outpatient with nephrology. She reported progressively worsening weakness over the previous few months. She also complained of poor appetite and occasional nausea but denied any current vomiting or diarrhea. She reported some dyspnea on exertion but no cough or chest pain. No edema. Work up in the ED found potassium elevated at 6.6 BUN was 78 with creatinine 5.28 and GFR 7.9.  Magnesium was 2.7.  UA showed moderate blood, trace leukocytes and trace bacteria.  Patient received 1 L fluid bolus and treatment for hyperkalemia per protocol in the ED.  She was admitted to the telemetry unit for further workup and management.    Nephrology was consulted and followed.  Patient was noted to be taking potentially nephrotoxic agents at home including captopril,  metformin, torsemide which were held on admission.  Given metabolic acidosis half-normal saline with bicarb infusion continued initially and as acidosis improving did transition to the LR infusion later in admission.  Potassium had remained stable following initial therapy.  Ultimately with fluid replacement and avoidance of nephrotoxins patient's renal function has slowly improved with creatinine 2.84 on the date of discharge-similar to levels from March of this year.  Etiology of acute kidney injury remains uncertain at this time-nephrology considering ischemic hypertensive nephrosclerosis versus diabetic nephropathy among others.  Given hematuria on UA and nephrolithiasis noted on CT imaging nephrology did recommend urology referral postdischarge. Given patient improving and otherwise clinically stable after further discussion with nephrology and no further inpatient workup planned patient was felt to have reached the maximum benefit of the current hospitalization.  She will be scheduled to follow-up with nephrology in 2 weeks for further management.  With patient's home captopril and torsemide held on admission we have been titrating new antihypertensives during this admission.  Patient will be discharged on amlodipine at 5 mg daily and hydralazine at 25 mg 3 times daily. As volume status remaining grossly stable with continue to hold torsemide.  Home metoprolol has been continued at previous dose.  Given A1c very well-controlled and creatinine clearance 13.9 on the date of discharge metformin will remain held.  Will continue patient's Tradjenta 5 mg and recommend further follow-up with PCP- she will be scheduled for routine hospital follow-up in 1 week.  PT and OT did follow with the patient during this admission given reports of recent progressively worsening weakness.  Patient will benefit from further assistance from home health with PT OT at discharge.    Discharge Vitals/Physical Examination     Vital  Signs:  Temp:  [97.5 °F (36.4 °C)-98.1 °F (36.7 °C)] 98.1 °F (36.7 °C)  Heart Rate:  [59-83] 69  Resp:  [18] 18  BP: (150-188)/(66-83) 174/67  Mean Arterial Pressure (Non-Invasive) for the past 24 hrs (Last 3 readings):   Noninvasive MAP (mmHg)   07/10/24 0641 84   07/10/24 0330 128   07/10/24 0134 156     SpO2 Percentage    07/09/24 2317 07/10/24 0330 07/10/24 0641   SpO2: 97% 96% 95%     SpO2:  [95 %-99 %] 95 %  on   ;   Device (Oxygen Therapy): room air    Body mass index is 30.84 kg/m².  Wt Readings from Last 3 Encounters:   07/10/24 64.6 kg (142 lb 8 oz)   05/02/24 66.4 kg (146 lb 6.4 oz)   10/25/23 67.4 kg (148 lb 9.6 oz)         Physical Exam:  Physical Exam  Vitals and nursing note reviewed.   Constitutional:       General: She is not in acute distress.     Appearance: She is obese.   HENT:      Head: Normocephalic and atraumatic.   Eyes:      Extraocular Movements: Extraocular movements intact.   Cardiovascular:      Rate and Rhythm: Normal rate.   Pulmonary:      Effort: Pulmonary effort is normal.   Abdominal:      Palpations: Abdomen is soft.   Musculoskeletal:      Right lower leg: No edema.      Left lower leg: No edema.   Skin:     General: Skin is warm and dry.   Neurological:      Mental Status: She is alert. Mental status is at baseline.   Psychiatric:         Mood and Affect: Mood normal.         Behavior: Behavior normal.         Pertinent Laboratory/Radiology Results     Pertinent Laboratory Results:  Results from last 7 days   Lab Units 07/03/24  1713 07/03/24  1530   CK TOTAL U/L  --  79   HSTROP T ng/L 23* 27*     Results from last 7 days   Lab Units 07/03/24  1530   PROBNP pg/mL 741.8         Results from last 7 days   Lab Units 07/08/24  0022 07/04/24  0117 07/03/24  1638 07/03/24  1530   CRP mg/dL  --   --   --  0.55*   WBC 10*3/mm3 7.59 7.87  --  6.94   HEMOGLOBIN g/dL 9.0* 9.1*  --  10.3*   HEMATOCRIT % 28.2* 28.4*  --  32.2*   MCV fL 96.2 97.9*  --  97.3*   MCHC g/dL 31.9 32.0  --  32.0  "  PLATELETS 10*3/mm3 302 285  --  339   INR   --   --  1.04  --      Results from last 7 days   Lab Units 07/10/24  0025 07/09/24  0050 07/08/24  0022 07/05/24  0411 07/04/24  0117 07/03/24  1925 07/03/24  1530   SODIUM mmol/L 138 140 139   < > 139   < > 138   POTASSIUM mmol/L 4.1 4.7 4.4   < > 5.1   < > 6.6*   MAGNESIUM mg/dL  --   --   --   --   --   --  2.7*   CHLORIDE mmol/L 104 108* 107   < > 105   < > 99   CO2 mmol/L 22.5 23.0 21.0*   < > 22.6   < > 24.6   BUN mg/dL 49* 54* 56*   < > 71*   < > 78*   CREATININE mg/dL 2.84* 3.32* 3.26*   < > 5.01*   < > 5.28*   CALCIUM mg/dL 9.3 9.1 9.0   < > 7.4*   < > 8.7   GLUCOSE mg/dL 107* 92 89   < > 133*   < > 89   ALBUMIN g/dL 3.3* 3.4* 3.2*   < > 3.1*   < > 3.9   BILIRUBIN mg/dL 0.3 0.3  --   --  0.2   < > 0.3   ALK PHOS U/L 95 92  --   --  59   < > 72   AST (SGOT) U/L 46* 72*  --   --  11   < > 11   ALT (SGPT) U/L 47* 41*  --   --  <5   < > 5    < > = values in this interval not displayed.   Estimated Creatinine Clearance: 13.9 mL/min (A) (by C-G formula based on SCr of 2.84 mg/dL (H)).  No results found for: \"AMMONIA\"    Glucose   Date/Time Value Ref Range Status   07/10/2024 0644 108 70 - 130 mg/dL Final   07/09/2024 1927 198 (H) 70 - 130 mg/dL Final   07/09/2024 1607 143 (H) 70 - 130 mg/dL Final   07/09/2024 1009 187 (H) 70 - 130 mg/dL Final   07/09/2024 0624 116 70 - 130 mg/dL Final   07/08/2024 1938 208 (H) 70 - 130 mg/dL Final   07/08/2024 1637 132 (H) 70 - 130 mg/dL Final   07/08/2024 1108 166 (H) 70 - 130 mg/dL Final     Lab Results   Component Value Date    HGBA1C 5.60 07/03/2024     Lab Results   Component Value Date    TSH 1.930 07/03/2024    FREET4 1.57 07/03/2024       No results found for: \"BLOODCX\"  No results found for: \"URINECX\"  No results found for: \"WOUNDCX\"  No results found for: \"STOOLCX\"  No results found for: \"RESPCX\"  Pain Management Panel  More data may exist         Latest Ref Rng & Units 7/3/2024 3/21/2024   Pain Management Panel "   Creatinine, Urine mg/dL 40.4  74.0  74.0    Amphetamine, Urine Qual Negative Negative  -   Barbiturates Screen, Urine Negative Negative  -   Benzodiazepine Screen, Urine Negative Negative  -   Buprenorphine, Screen, Urine Negative Negative  -   Cocaine Screen, Urine Negative Negative  -   Fentanyl, Urine Negative Negative  -   Methadone Screen , Urine Negative Negative  -   Methamphetamine, Ur Negative Negative  -       Pertinent Radiology Results:  Imaging Results (All)       Procedure Component Value Units Date/Time    XR Chest 1 View [[id number]] Collected: 07/07/24 0624     Updated: 07/07/24 0627    Narrative:      PROCEDURE: Portable chest x-ray examination performed on July 7, 2024.  Single view. Supine position.     HISTORY: Shortness of breath.     COMPARISON: None.     FINDINGS:     Heart size at the upper limits of normal.  No lobar consolidation or edema.   No pleural effusion or pneumothorax.  No fracture or foreign body.       Impression:         1.  Heart size at the upper limits of normal.  2.  No lobar consolidation or edema.  3.  No pleural effusion or pneumothorax.  4.  No free air in the upper abdomen.  5.  Slightly elevated right hemidiaphragm.     This report was finalized on 7/7/2024 6:25 AM by Amandeep Saleh MD.       CT Abdomen Pelvis Without Contrast [[id number]] Collected: 07/03/24 1955     Updated: 07/03/24 1959    Narrative:      INDICATION: Abdominal pain.     COMPARISON: No relevant priors available.     TECHNIQUE: Axial CT images of the abdomen and pelvis were obtained  without IV contrast administration. Coronal and sagittal reformations  were reviewed.     FINDINGS:  . Calcified granulomas in the lung bases.     The liver, gallbladder, spleen, pancreas and adrenal glands appear  unremarkable. The gallbladder is surgically absent. Air in the biliary  tree likely relates to prior biliary intervention.     8 mm left renal calculus. No hydronephrosis. Nonspecific bilateral  perinephric  fat stranding. Fat-containing umbilical hernia.     No evidence of bowel obstruction/colitis/appendicitis. No free air. No  drainable fluid collection.     The urinary bladder appears unremarkable. Fat-containing bilateral  inguinal hernias.     Mild degenerative changes in the spine. No acute osseous abnormality  evident.       Impression:      1.  No acute process in the abdomen or pelvis.  2.  Nonobstructing left renal calculus.  3.  Additional findings as above.        This report was finalized on 7/3/2024 7:57 PM by Alex Pallas, DO.       XR Chest 1 View [[id number]] Collected: 07/03/24 1720     Updated: 07/03/24 1722    Narrative:      XR CHEST 1 VW-     CLINICAL INDICATION: sob        COMPARISON: None immediately available      TECHNIQUE: Single frontal view of the chest.     FINDINGS:     LUNGS: Lungs are adequately aerated.      HEART AND MEDIASTINUM: Heart and mediastinal contours are unremarkable        SKELETON: Bony and soft tissue structures are unremarkable.             Impression:      No radiographic evidence of acute cardiac or pulmonary disease.           This report was finalized on 7/3/2024 5:20 PM by Dr. Connor Sy MD.               Discharge Disposition/Discharge Medications/Discharge Appointments     Discharge Disposition:   Home-Health Care [facility]    Condition at Discharge:  Stable    Discharge Medications:     Your medication list        START taking these medications        Instructions Last Dose Given Next Dose Due   amLODIPine 5 MG tablet  Commonly known as: NORVASC      Take 1 tablet by mouth Daily for 30 days.       atorvastatin 10 MG tablet  Commonly known as: LIPITOR  Replaces: simvastatin 20 MG tablet      Take 1 tablet by mouth Daily for 30 days.       hydrALAZINE 25 MG tablet  Commonly known as: APRESOLINE      Take 1 tablet by mouth Every 8 (Eight) Hours for 30 days.              CONTINUE taking these medications        Instructions Last Dose Given Next Dose Due   alendronate 70  MG tablet  Commonly known as: FOSAMAX      Take 1 tablet by mouth Every 7 (Seven) Days.       aspirin 325 MG EC tablet      Take 1 tablet by mouth every night at bedtime.       levothyroxine 25 MCG tablet  Commonly known as: SYNTHROID, LEVOTHROID      Take 1 tablet by mouth Daily.       linagliptin 5 MG tablet tablet  Commonly known as: TRADJENTA      Take 1 tablet by mouth Daily.       metoprolol succinate  MG 24 hr tablet  Commonly known as: TOPROL-XL      Take 1 tablet by mouth Daily.       montelukast 10 MG tablet  Commonly known as: SINGULAIR      Take 1 tablet by mouth Every Night.       omeprazole 40 MG capsule  Commonly known as: priLOSEC      Take 1 capsule by mouth Daily.       oxybutynin XL 5 MG 24 hr tablet  Commonly known as: DITROPAN-XL      Take 1 tablet by mouth Daily.       timolol 0.5 % ophthalmic solution  Commonly known as: TIMOPTIC      Administer 1 drop to both eyes 2 (Two) Times a Day.              STOP taking these medications      captopril 12.5 MG tablet  Commonly known as: CAPOTEN        metFORMIN 500 MG tablet  Commonly known as: GLUCOPHAGE        simvastatin 20 MG tablet  Commonly known as: ZOCOR  Replaced by: atorvastatin 10 MG tablet        torsemide 20 MG tablet  Commonly known as: DEMADEX                  Where to Get Your Medications        These medications were sent to [place] [address]      Hours: Monday to Friday 7 AM to 6 PM Phone: [phone]   amLODIPine 5 MG tablet  atorvastatin 10 MG tablet  hydrALAZINE 25 MG tablet          Discharge Diet:  Renal, diabetic    Discharge Activity:  as tolerated      Time spent on this discharge exceeded 30 minutes.      Electronically signed by Ti Sewell PA-C at 07/10/24 1049       Discharge Order (From admission, onward)       Start     Ordered    07/10/24 0859  Discharge patient  Once        Expected Discharge Date: 07/10/24   Discharge Disposition: Home-Health Care [facility]    Physician of Record for Attribution - Please select from Treatment Team: PATRICK MEDINA [[id number]]   Review needed by CMO to determine Physician of Record: No      Question Answer Comment   Physician of Record for Attribution - Please select from Treatment Team PATRICK MEDINA    Review needed by CMO to determine Physician of Record No        07/10/24 0858

## 2024-07-10 NOTE — CASE MANAGEMENT/SOCIAL WORK
Discharge Planning Assessment   [place]     Patient Name: Aggie Perkins  MRN: [id number]  Today's Date: 7/10/2024    Admit Date: 7/3/2024    Plan: SS spoke with pt at bedside.  Pt stated preference for [facility].  SS made referral to [facility] at [phone] and faxed pt information to [phone].  SS will follow.       Discharge Plan       Row Name 07/10/24 1029       Plan    Plan SS spoke with pt at bedside.  Pt stated preference for [facility].  SS made referral to [facility] at [phone] and faxed pt information to [phone].  SS will follow.                  Continued Care and Services - Admitted Since 7/3/2024    No active coordination exists for this encounter.       Expected Discharge Date and Time       Expected Discharge Date Expected Discharge Time    Jul 10, 2024             Linette Petersen, KAREEMW

## 2024-07-11 NOTE — OUTREACH NOTE
Prep Survey      Flowsheet Row Responses   [facility] facility patient discharged from? [facility]   Is LACE score < 7 ? No   Eligibility Readm Mgmt   Discharge diagnosis a/c Renal failure   Does the patient have one of the following disease processes/diagnoses(primary or secondary)? Other   Does the patient have Home health ordered? Yes   What is the Home health agency?  [facility]   Is there a DME ordered? No   Prep survey completed? Yes            NIDIA IRWIN - Registered Nurse

## 2024-07-17 ENCOUNTER — READMISSION MANAGEMENT (OUTPATIENT)
Dept: CALL CENTER | Facility: HOSPITAL | Age: 79
End: 2024-07-17
Payer: MEDICARE

## 2024-07-17 NOTE — OUTREACH NOTE
Medical Week 1 Survey      Flowsheet Row Responses   [facility] patient discharged from? [facility]   Does the patient have one of the following disease processes/diagnoses(primary or secondary)? Other   Week 1 attempt successful? Yes   Call start time 0830   Call end time 0832   Discharge diagnosis a/c Renal failure   Is patient permission given to speak with other caregiver? Yes   List who call center can speak with Jeanette ValerioTavares   Person spoke with today (if not patient) and relationship Jeanette ChildressWilian   Meds reviewed with patient/caregiver? Yes   Is the patient having any side effects they believe may be caused by any medication additions or changes? No   Does the patient have all medications ordered at discharge? Yes   Is the patient taking all medications as directed (includes completed medication regime)? Yes   Comments regarding appointments Nephrology appt 8/15/24   Does the patient have a primary care provider?  Yes   Does the patient have an appointment with their PCP within 7 days of discharge? Yes   Comments regarding PCP PCP follow up 7/17/24   Has the patient kept scheduled appointments due by today? N/A   What is the Home health agency?  [facility]   Has home health visited the patient within 72 hours of discharge? Yes   Home health comments HH/PT   Psychosocial issues? No   Did the patient receive a copy of their discharge instructions? Yes   Nursing interventions Reviewed instructions with patient   What is the patient's perception of their health status since discharge? Improving   Is the patient/caregiver able to teach back signs and symptoms related to disease process for when to call PCP? Yes   Is the patient/caregiver able to teach back signs and symptoms related to disease process for when to call 911? Yes   Is the patient/caregiver able to teach back the hierarchy of who to call/visit for symptoms/problems? PCP, Specialist, Home health nurse, Urgent Care, ED, 911 Yes   If  the patient is a current smoker, are they able to teach back resources for cessation? Not a smoker   Week 1 call completed? Yes   Would this patient benefit from a Referral to [facility] Social Work? No   Is the patient interested in additional calls from an ambulatory ? No   Call end time [id number]            Iman MIR - Registered Nurse

## 2024-08-19 ENCOUNTER — OFFICE VISIT (OUTPATIENT)
Dept: UROLOGY | Facility: CLINIC | Age: 79
End: 2024-08-19
Payer: MEDICARE

## 2024-08-19 VITALS
HEART RATE: 70 BPM | BODY MASS INDEX: 31.54 KG/M2 | SYSTOLIC BLOOD PRESSURE: 155 MMHG | WEIGHT: 146.2 LBS | HEIGHT: 57 IN | DIASTOLIC BLOOD PRESSURE: 75 MMHG

## 2024-08-19 DIAGNOSIS — R31.29 MICROHEMATURIA: Primary | ICD-10-CM

## 2024-08-19 DIAGNOSIS — N20.0 LEFT RENAL STONE: ICD-10-CM

## 2024-08-19 LAB
BILIRUB BLD-MCNC: NEGATIVE MG/DL
CLARITY, POC: CLEAR
COLOR UR: YELLOW
EXPIRATION DATE: ABNORMAL
GLUCOSE UR STRIP-MCNC: NEGATIVE MG/DL
KETONES UR QL: NEGATIVE
LEUKOCYTE EST, POC: NEGATIVE
Lab: ABNORMAL
NITRITE UR-MCNC: NEGATIVE MG/ML
PH UR: 6.5 [PH] (ref 5–8)
PROT UR STRIP-MCNC: ABNORMAL MG/DL
RBC # UR STRIP: ABNORMAL /UL
SP GR UR: 1.01 (ref 1–1.03)
UROBILINOGEN UR QL: NORMAL

## 2024-08-19 PROCEDURE — 81003 URINALYSIS AUTO W/O SCOPE: CPT

## 2024-08-19 PROCEDURE — 3077F SYST BP >= 140 MM HG: CPT

## 2024-08-19 PROCEDURE — 1160F RVW MEDS BY RX/DR IN RCRD: CPT

## 2024-08-19 PROCEDURE — 99203 OFFICE O/P NEW LOW 30 MIN: CPT

## 2024-08-19 PROCEDURE — 1159F MED LIST DOCD IN RCRD: CPT

## 2024-08-19 PROCEDURE — 3078F DIAST BP <80 MM HG: CPT

## 2024-08-19 NOTE — PROGRESS NOTES
"Chief Complaint:    Chief Complaint   Patient presents with    Microhematuria     New Patient     Nephrolithiasis       Vital Signs:   /75   Pulse 70   Ht 144.8 cm (57\")   Wt 66.3 kg (146 lb 3.2 oz)   BMI 31.64 kg/m²   Body mass index is 31.64 kg/m².      HPI:  Aggie Perkins is a 79 y.o. female who presents today for initial evaluation     History of Present Illness  Ms. Perkins presents to the clinic for hospital follow-up for microscopic hematuria and nephrolithiasis.  She has a past medical history significant for type 2 diabetes mellitus, hyperlipidemia, hypothyroidism, stage IV chronic kidney disease, and hypertension.  She has been referred to us by Dr. Patrick Taveras DO.  She was recently admitted to the hospital in July secondary to acute on chronic renal failure.  During her initial ER workup she had a CT scan abdomen pelvis that showed a large roughly 10 to 11 mm lower pole left intrarenal calculi.  There was atrophy of the kidney consistent with her renal disease.  Her creatinine was severely elevated at 5.28 with a GFR 7.9.  Upon discharge creatinine improved to 2.84.  She has followed up with her nephrologist recently and had a BMP completed on 12 August that showed further improvement in her creatinine to 1.97.  GFR is improved to 26.  She does report some intermittent left-sided back and flank pain but denies any gross hematuria.  Urinalysis in office today shows trace amount of protein with 1+ blood.  She does take a aspirin 325 mg once daily.  She does take oxybutynin for overactive bladder and mild incontinence.      Past Medical History:  Past Medical History:   Diagnosis Date    Arthritis     Diabetes mellitus     Disease of thyroid gland     Hyperlipidemia     Hypertension     PHT (pulmonary hypertension) 3/30/2023       Current Meds:  Current Outpatient Medications   Medication Sig Dispense Refill    alendronate (FOSAMAX) 70 MG tablet Take 1 tablet by mouth Every 7 (Seven) Days.      " aspirin 325 MG EC tablet Take 1 tablet by mouth every night at bedtime.      levothyroxine (SYNTHROID, LEVOTHROID) 25 MCG tablet Take 1 tablet by mouth Daily.      linagliptin (TRADJENTA) 5 MG tablet tablet Take 1 tablet by mouth Daily.      metoprolol succinate XL (TOPROL-XL) 100 MG 24 hr tablet Take 1 tablet by mouth Daily.      montelukast (SINGULAIR) 10 MG tablet Take 1 tablet by mouth Every Night.      omeprazole (priLOSEC) 40 MG capsule Take 1 capsule by mouth Daily.      oxybutynin XL (DITROPAN-XL) 5 MG 24 hr tablet Take 1 tablet by mouth Daily.      timolol (TIMOPTIC) 0.5 % ophthalmic solution Administer 1 drop to both eyes 2 (Two) Times a Day.      hydrALAZINE (APRESOLINE) 25 MG tablet Take 1 tablet by mouth Every 8 (Eight) Hours for 30 days. 90 tablet 0     No current facility-administered medications for this visit.        Allergies:   No Known Allergies     Past Surgical History:  Past Surgical History:   Procedure Laterality Date    CARDIOVASCULAR STRESS TEST  03/20/2023    @ [facility]. . Lexiscan- EF 70%. negative    CHOLECYSTECTOMY      COLONOSCOPY  2015    COLONOSCOPY N/A 03/23/2018    Procedure: COLONOSCOPY CPT CODE: 99434;  Surgeon: Rinku Urias III, MD;  Location: [facility];  Service: Gastroenterology    ECHO - CONVERTED  03/06/2023    @ [facility]. - EF 65%SARITA-1.7 [id number]- 52 mmHg    ECHO - CONVERTED  11/07/2023    LVH. EF 60%. LA- 3.8. MVA-1.9. Trace-Mild MR & AI    ENDOSCOPY      ENDOSCOPY N/A 03/23/2018    Procedure: ESOPHAGOGASTRODUODENOSCOPY WITH BIOPSY CPT CODE: 78332;  Surgeon: Rinku Urias III, MD;  Location: [facility];  Service: Gastroenterology    HYSTERECTOMY         Social History:  Social History     Socioeconomic History    Marital status: Single   Tobacco Use    Smoking status: Never    Smokeless tobacco: Never   Vaping Use    Vaping status: Never Used   Substance and Sexual Activity    Alcohol use: No    Drug use: No    Sexual activity: Defer       Family  History:  Family History   Problem Relation Age of Onset    Colon cancer Mother     Colon cancer Father     Colon cancer Brother        Review of Systems:  Review of Systems   Constitutional:  Positive for fatigue. Negative for chills and fever.   HENT:  Negative for congestion and sinus pressure.    Respiratory:  Negative for chest tightness and shortness of breath.    Cardiovascular:  Negative for chest pain.   Gastrointestinal:  Negative for abdominal pain, constipation, diarrhea, nausea and vomiting.   Genitourinary:  Positive for flank pain, frequency and hematuria. Negative for difficulty urinating, dysuria, pelvic pain and urgency.   Musculoskeletal:  Positive for back pain. Negative for neck pain.   Allergic/Immunologic: Negative for food allergies.   Neurological:  Negative for dizziness and headaches.   Hematological:  Bruises/bleeds easily.   Psychiatric/Behavioral:  The patient is nervous/anxious.        Physical Exam:  Physical Exam  Constitutional:       General: She is not in acute distress.     Appearance: Normal appearance.   HENT:      Head: Normocephalic and atraumatic.      Nose: Nose normal.      Mouth/Throat:      Mouth: Mucous membranes are moist.   Eyes:      Conjunctiva/sclera: Conjunctivae normal.   Cardiovascular:      Rate and Rhythm: Normal rate and regular rhythm.      Pulses: Normal pulses.      Heart sounds: Normal heart sounds.   Pulmonary:      Effort: Pulmonary effort is normal.      Breath sounds: Normal breath sounds.   Abdominal:      General: Bowel sounds are normal.      Palpations: Abdomen is soft.   Musculoskeletal:         General: Normal range of motion.      Cervical back: Normal range of motion.   Skin:     General: Skin is warm.   Neurological:      General: No focal deficit present.      Mental Status: She is alert and oriented to person, place, and time.   Psychiatric:         Mood and Affect: Mood normal.         Behavior: Behavior normal.         Thought Content:  Thought content normal.         Judgment: Judgment normal.           Recent Image (CT and/or KUB):   CT Abdomen and Pelvis: No results found for this or any previous visit.     CT Stone Protocol: No results found for this or any previous visit.     KUB: No results found for this or any previous visit.       Labs:  Brief Urine Lab Results  (Last result in the past 365 days)        Color   Clarity   Blood   Leuk Est   Nitrite   Protein   CREAT   Urine HCG        08/19/24 1048 Yellow   Clear   1+   Negative   Negative   Trace                 Office Visit on 08/19/2024   Component Date Value Ref Range Status    Color 08/19/2024 Yellow  Yellow, Straw, Dark Yellow, [place] Final    Clarity, UA 08/19/2024 Clear  Clear Final    Specific Gravity  08/19/2024 1.010  1.005 - 1.030 Final    pH, Urine 08/19/2024 6.5  5.0 - 8.0 Final    Leukocytes 08/19/2024 Negative  Negative Final    Nitrite, UA 08/19/2024 Negative  Negative Final    Protein, POC 08/19/2024 Trace (A)  Negative mg/dL Final    Glucose, UA 08/19/2024 Negative  Negative mg/dL Final    Ketones, UA 08/19/2024 Negative  Negative Final    Urobilinogen, UA 08/19/2024 Normal  Normal, 0.2 E.U./dL Final    Bilirubin 08/19/2024 Negative  Negative Final    Blood, UA 08/19/2024 1+ (A)  Negative Final    Lot Number 08/19/2024 n   Final    Expiration Date 08/19/2024 n   Final   No results displayed because visit has over 200 results.           Procedure: None  Procedures     I have reviewed and agree with the above PMH, PSH, FMH, social history, medications, allergies, and labs.     Assessment/Plan:   Problem List Items Addressed This Visit       Microhematuria - Primary    Relevant Orders    POC Urinalysis Dipstick, Automated (Completed)    Left renal stone       Health Maintenance:   Health Maintenance Due   Topic Date Due    DXA SCAN  Never done    Pneumococcal Vaccine 65+ (1 of 2 - PCV) Never done    DIABETIC EYE EXAM  Never done    TDAP/TD VACCINES (1 - Tdap) Never done     ZOSTER VACCINE (1 of 2) Never done    RSV Vaccine - Adults (1 - 1-dose 60+ series) Never done    HEPATITIS C SCREENING  Never done    ANNUAL WELLNESS VISIT  Never done    COVID-19 Vaccine (4 - 2023-24 season) 09/01/2023    BMI FOLLOWUP  03/30/2024    LIPID PANEL  07/14/2024    INFLUENZA VACCINE  08/01/2024        Smoking Counseling: Never smoked.  Never used smokeless tobacco.    Urine Incontinence: Patient reports that she is experiencing mild symptoms of incontinence.    Patient was given instructions and counseling regarding her condition or for health maintenance advice. Please see specific information pulled into the AVS if appropriate.    Patient Education:   Microscopic hematuria/left renal stone -discussed with the patient the pathophysiology of these conditions in detail.  Advised patient her microscopic hematuria is most likely secondary to left renal calculus as well as stage IV chronic kidney disease. We discussed the various therapeutic options available including percutaneous nephrostolithotomy, ureteroscopy and extracorporeal shockwave  lithotripsy.  We discussed the risks of lithotripsy including the passage of stones leading to a 3% chance of Steinstrasse or a large string of stones in the distal ureter. In this incidence the patient was informed that a ureteroscopy is indicated for obstructing fragments.  Patient was informed of an extremely rare incidence of renal hematoma and the significance of this.  Patient was educated on percutaneous nephrostolithotomy and its use as well as the risks and benefits such as the need for postoperative hospitalization, and the risk of damage to the kidney and the remote risk of a nephrectomy.  We also discussed the use of ureteroscopy in the upper tracts and its decreased success rate to completely remove the stones likely causing stent placement leading to an additional procedure for removal.  We discussed the absolute relative indicators for intervention  including the presence of sepsis and uncontrollable pain leading to need for urgent intervention.  We discussed placement of a stent if indicated and the management of the stent as well.  At this time given current age as well as other comorbidities.  Recommend close observation.  Patient will continue to have microscopic hematuria with a left large renal calculus.  Advised to return to the clinic if she has any worsening symptoms or severe back or flank pain.  Educated to go to the nearest ER if she begins to experience fever, chills, inability urinate, or altered mental status.  Otherwise we will place her back in 6 months for repeat UA and KUB.  She verbalized understanding.    Visit Diagnoses:    ICD-10-CM ICD-9-CM   1. Microhematuria  R31.29 599.72   2. Left renal stone  N20.0 592.0     A total of 30 minutes were spent coordinating this patient’s care in clinic today; 15 minutes of which were face-to-face with the patient, reviewing medical history and counseling on the current treatment and followup plan.  All questions were answered to patient's satisfaction.    Meds Ordered During Visit:  No orders of the defined types were placed in this encounter.      Follow Up Appointment: 6 months  No follow-ups on file.      This document has been electronically signed by Mike Ramos PA-C   August 19, 2024 11:16 EDT    Part of this note may be an electronic transcription/translation of spoken language to printed text using the Dragon Dictation System.

## 2024-09-21 ENCOUNTER — APPOINTMENT (OUTPATIENT)
Dept: CT IMAGING | Facility: HOSPITAL | Age: 79
End: 2024-09-21
Payer: MEDICARE

## 2024-09-21 ENCOUNTER — HOSPITAL ENCOUNTER (EMERGENCY)
Facility: HOSPITAL | Age: 79
Discharge: HOME | End: 2024-09-21
Attending: STUDENT IN AN ORGANIZED HEALTH CARE EDUCATION/TRAINING PROGRAM
Payer: MEDICARE

## 2024-09-21 VITALS
DIASTOLIC BLOOD PRESSURE: 75 MMHG | HEART RATE: 74 BPM | WEIGHT: 140 LBS | HEIGHT: 57 IN | BODY MASS INDEX: 30.2 KG/M2 | TEMPERATURE: 97.8 F | OXYGEN SATURATION: 96 % | RESPIRATION RATE: 14 BRPM | SYSTOLIC BLOOD PRESSURE: 148 MMHG

## 2024-09-21 DIAGNOSIS — M54.50 ACUTE RIGHT-SIDED LOW BACK PAIN, UNSPECIFIED WHETHER SCIATICA PRESENT: Primary | ICD-10-CM

## 2024-09-21 LAB
ALBUMIN SERPL-MCNC: 3.7 G/DL (ref 3.5–5.2)
ALBUMIN/GLOB SERPL: 0.8 G/DL
ALP SERPL-CCNC: 215 U/L (ref 39–117)
ALT SERPL W P-5'-P-CCNC: 49 U/L (ref 1–33)
ANION GAP SERPL CALCULATED.3IONS-SCNC: 13.4 MMOL/L (ref 5–15)
AST SERPL-CCNC: 25 U/L (ref 1–32)
BACTERIA UR QL AUTO: ABNORMAL /HPF
BASOPHILS # BLD AUTO: 0.03 10*3/MM3 (ref 0–0.2)
BASOPHILS NFR BLD AUTO: 0.2 % (ref 0–1.5)
BILIRUB SERPL-MCNC: 0.3 MG/DL (ref 0–1.2)
BILIRUB UR QL STRIP: NEGATIVE
BUN SERPL-MCNC: 49 MG/DL (ref 8–23)
BUN/CREAT SERPL: 22.8 (ref 7–25)
CALCIUM SPEC-SCNC: 9.7 MG/DL (ref 8.6–10.5)
CHLORIDE SERPL-SCNC: 105 MMOL/L (ref 98–107)
CLARITY UR: CLEAR
CO2 SERPL-SCNC: 19.6 MMOL/L (ref 22–29)
COLOR UR: YELLOW
CREAT SERPL-MCNC: 2.15 MG/DL (ref 0.57–1)
DEPRECATED RDW RBC AUTO: 46.7 FL (ref 37–54)
EGFRCR SERPLBLD CKD-EPI 2021: 23.2 ML/MIN/1.73
EOSINOPHIL # BLD AUTO: 0.11 10*3/MM3 (ref 0–0.4)
EOSINOPHIL NFR BLD AUTO: 0.9 % (ref 0.3–6.2)
ERYTHROCYTE [DISTWIDTH] IN BLOOD BY AUTOMATED COUNT: 13.2 % (ref 12.3–15.4)
GLOBULIN UR ELPH-MCNC: 4.8 GM/DL
GLUCOSE SERPL-MCNC: 180 MG/DL (ref 65–99)
GLUCOSE UR STRIP-MCNC: NEGATIVE MG/DL
HCT VFR BLD AUTO: 34 % (ref 34–46.6)
HGB BLD-MCNC: 10.8 G/DL (ref 12–15.9)
HGB UR QL STRIP.AUTO: NEGATIVE
HYALINE CASTS UR QL AUTO: ABNORMAL /LPF
IMM GRANULOCYTES # BLD AUTO: 0.04 10*3/MM3 (ref 0–0.05)
IMM GRANULOCYTES NFR BLD AUTO: 0.3 % (ref 0–0.5)
KETONES UR QL STRIP: NEGATIVE
LEUKOCYTE ESTERASE UR QL STRIP.AUTO: NEGATIVE
LYMPHOCYTES # BLD AUTO: 1.01 10*3/MM3 (ref 0.7–3.1)
LYMPHOCYTES NFR BLD AUTO: 8 % (ref 19.6–45.3)
MCH RBC QN AUTO: 30.6 PG (ref 26.6–33)
MCHC RBC AUTO-ENTMCNC: 31.8 G/DL (ref 31.5–35.7)
MCV RBC AUTO: 96.3 FL (ref 79–97)
MONOCYTES # BLD AUTO: 0.45 10*3/MM3 (ref 0.1–0.9)
MONOCYTES NFR BLD AUTO: 3.6 % (ref 5–12)
NEUTROPHILS NFR BLD AUTO: 10.97 10*3/MM3 (ref 1.7–7)
NEUTROPHILS NFR BLD AUTO: 87 % (ref 42.7–76)
NITRITE UR QL STRIP: NEGATIVE
NRBC BLD AUTO-RTO: 0 /100 WBC (ref 0–0.2)
PH UR STRIP.AUTO: 6 [PH] (ref 5–8)
PLATELET # BLD AUTO: 460 10*3/MM3 (ref 140–450)
PMV BLD AUTO: 8.7 FL (ref 6–12)
POTASSIUM SERPL-SCNC: 5 MMOL/L (ref 3.5–5.2)
PROT SERPL-MCNC: 8.5 G/DL (ref 6–8.5)
PROT UR QL STRIP: ABNORMAL
RBC # BLD AUTO: 3.53 10*6/MM3 (ref 3.77–5.28)
RBC # UR STRIP: ABNORMAL /HPF
REF LAB TEST METHOD: ABNORMAL
SODIUM SERPL-SCNC: 138 MMOL/L (ref 136–145)
SP GR UR STRIP: 1.01 (ref 1–1.03)
SQUAMOUS #/AREA URNS HPF: ABNORMAL /HPF
UROBILINOGEN UR QL STRIP: ABNORMAL
WBC # UR STRIP: ABNORMAL /HPF
WBC NRBC COR # BLD AUTO: 12.61 10*3/MM3 (ref 3.4–10.8)

## 2024-09-21 PROCEDURE — 96375 TX/PRO/DX INJ NEW DRUG ADDON: CPT

## 2024-09-21 PROCEDURE — 96361 HYDRATE IV INFUSION ADD-ON: CPT

## 2024-09-21 PROCEDURE — 81001 URINALYSIS AUTO W/SCOPE: CPT | Performed by: NURSE PRACTITIONER

## 2024-09-21 PROCEDURE — 72131 CT LUMBAR SPINE W/O DYE: CPT | Performed by: RADIOLOGY

## 2024-09-21 PROCEDURE — 36415 COLL VENOUS BLD VENIPUNCTURE: CPT

## 2024-09-21 PROCEDURE — 25810000003 SODIUM CHLORIDE 0.9 % SOLUTION: Performed by: NURSE PRACTITIONER

## 2024-09-21 PROCEDURE — 25010000002 MORPHINE PER 10 MG: Performed by: NURSE PRACTITIONER

## 2024-09-21 PROCEDURE — 85025 COMPLETE CBC W/AUTO DIFF WBC: CPT | Performed by: NURSE PRACTITIONER

## 2024-09-21 PROCEDURE — 74176 CT ABD & PELVIS W/O CONTRAST: CPT

## 2024-09-21 PROCEDURE — 99284 EMERGENCY DEPT VISIT MOD MDM: CPT

## 2024-09-21 PROCEDURE — 80053 COMPREHEN METABOLIC PANEL: CPT | Performed by: NURSE PRACTITIONER

## 2024-09-21 PROCEDURE — 72128 CT CHEST SPINE W/O DYE: CPT | Performed by: RADIOLOGY

## 2024-09-21 PROCEDURE — 72128 CT CHEST SPINE W/O DYE: CPT

## 2024-09-21 PROCEDURE — 72131 CT LUMBAR SPINE W/O DYE: CPT

## 2024-09-21 PROCEDURE — 74176 CT ABD & PELVIS W/O CONTRAST: CPT | Performed by: RADIOLOGY

## 2024-09-21 PROCEDURE — 96374 THER/PROPH/DIAG INJ IV PUSH: CPT

## 2024-09-21 PROCEDURE — 25010000002 ONDANSETRON PER 1 MG: Performed by: NURSE PRACTITIONER

## 2024-09-21 RX ORDER — HYDROCODONE BITARTRATE AND ACETAMINOPHEN 5; 325 MG/1; MG/1
1 TABLET ORAL EVERY 6 HOURS PRN
Qty: 10 TABLET | Refills: 0 | Status: SHIPPED | OUTPATIENT
Start: 2024-09-21

## 2024-09-21 RX ORDER — MORPHINE SULFATE 2 MG/ML
2 INJECTION, SOLUTION INTRAMUSCULAR; INTRAVENOUS ONCE
Status: COMPLETED | OUTPATIENT
Start: 2024-09-21 | End: 2024-09-21

## 2024-09-21 RX ORDER — SODIUM CHLORIDE 0.9 % (FLUSH) 0.9 %
10 SYRINGE (ML) INJECTION AS NEEDED
Status: DISCONTINUED | OUTPATIENT
Start: 2024-09-21 | End: 2024-09-21 | Stop reason: HOSPADM

## 2024-09-21 RX ORDER — ONDANSETRON 2 MG/ML
4 INJECTION INTRAMUSCULAR; INTRAVENOUS ONCE
Status: COMPLETED | OUTPATIENT
Start: 2024-09-21 | End: 2024-09-21

## 2024-09-21 RX ADMIN — MORPHINE SULFATE 2 MG: 2 INJECTION, SOLUTION INTRAMUSCULAR; INTRAVENOUS at 10:48

## 2024-09-21 RX ADMIN — SODIUM CHLORIDE 500 ML: 9 INJECTION, SOLUTION INTRAVENOUS at 10:48

## 2024-09-21 RX ADMIN — ONDANSETRON 4 MG: 2 INJECTION INTRAMUSCULAR; INTRAVENOUS at 10:48

## 2024-10-15 RX ORDER — METOPROLOL SUCCINATE 100 MG/1
100 TABLET, EXTENDED RELEASE ORAL DAILY
Qty: 90 TABLET | OUTPATIENT
Start: 2024-10-15

## 2024-11-12 ENCOUNTER — OFFICE VISIT (OUTPATIENT)
Dept: CARDIOLOGY | Facility: CLINIC | Age: 79
End: 2024-11-12
Payer: MEDICARE

## 2024-11-12 VITALS
DIASTOLIC BLOOD PRESSURE: 75 MMHG | SYSTOLIC BLOOD PRESSURE: 140 MMHG | BODY MASS INDEX: 32.79 KG/M2 | RESPIRATION RATE: 18 BRPM | HEART RATE: 68 BPM | HEIGHT: 57 IN | WEIGHT: 152 LBS

## 2024-11-12 DIAGNOSIS — E66.811 OBESITY (BMI 30.0-34.9): ICD-10-CM

## 2024-11-12 DIAGNOSIS — I27.20 PHT (PULMONARY HYPERTENSION): ICD-10-CM

## 2024-11-12 DIAGNOSIS — I10 PRIMARY HYPERTENSION: Primary | ICD-10-CM

## 2024-11-12 DIAGNOSIS — N18.4 CKD (CHRONIC KIDNEY DISEASE) STAGE 4, GFR 15-29 ML/MIN: ICD-10-CM

## 2024-11-12 PROCEDURE — 3077F SYST BP >= 140 MM HG: CPT | Performed by: NURSE PRACTITIONER

## 2024-11-12 PROCEDURE — 99214 OFFICE O/P EST MOD 30 MIN: CPT | Performed by: NURSE PRACTITIONER

## 2024-11-12 PROCEDURE — 3078F DIAST BP <80 MM HG: CPT | Performed by: NURSE PRACTITIONER

## 2024-11-12 RX ORDER — AMLODIPINE BESYLATE 5 MG/1
5 TABLET ORAL DAILY
COMMUNITY

## 2024-11-12 RX ORDER — HYDRALAZINE HYDROCHLORIDE 25 MG/1
25 TABLET, FILM COATED ORAL 3 TIMES DAILY
COMMUNITY

## 2024-11-12 NOTE — PROGRESS NOTES
Chief Complaint   Patient presents with    Follow-up     Pt is here for cardiac follow up.   Pt denies having any cardiac issues and says bp has been doing good at home recent labs in chart    Med Refill     90 day refills on cardiac meds [facility] in [place]. Med list brought to apt       Cardiac Complaints  none      Subjective   Aggie Perkins is a 79 y.o. female with HTN, DM, hypothyroidism, CKD, and hypercholesterolemia. In early 2023 she was hospitalized, found to be hypoxic and treated with diuretics. Echocardiogram read by another cardiologist showed mild AS and mild PTH. Stress test per radiologist reported as normal. Sildenafil initially prescribed but insurance did not cover. Beta-blocker dose increased. On 11/7/2023 echocardiogram showed mild to moderate LVH with a EF around 56-60%. Renal US in Feb 2024 neg for flow limiting stenosis.    She returns today for follow up and new concerns are denied. No CP, SOA, palpitations, dizziness, or syncope noted. Niece who accompanies her states since she has her medication more routine, everything has been much better. Not had any medication yet today. Will follow with renal in two days. Labs with PCP, but checked recently at hospital in September and showed: HH 10.8/34, Platelet 460, BUn 49, Creatinine 2.15, Na 138, K 5, GFR 23. Refills per request.         Cardiac History  Past Surgical History:   Procedure Laterality Date    CARDIOVASCULAR STRESS TEST  03/20/2023    @ [facility]. . Graham- EF 70%. negative    CHOLECYSTECTOMY      COLONOSCOPY  2015    COLONOSCOPY N/A 03/23/2018    Procedure: COLONOSCOPY CPT CODE: 64218;  Surgeon: Rinku Urias III, MD;  Location: [facility];  Service: Gastroenterology    ECHO - CONVERTED  03/06/2023    @ [facility]. - EF 65%SARITA-1.7 [id number]- 52 mmHg    ECHO - CONVERTED  11/07/2023    LVH. EF 60%. LA- 3.8. MVA-1.9. Trace-Mild MR & AI    ENDOSCOPY      ENDOSCOPY N/A 03/23/2018    Procedure: ESOPHAGOGASTRODUODENOSCOPY  WITH BIOPSY CPT CODE: 37252;  Surgeon: Rinku Urias III, MD;  Location: [facility];  Service: Gastroenterology    HYSTERECTOMY         Current Outpatient Medications   Medication Sig Dispense Refill    alendronate (FOSAMAX) 70 MG tablet Take 1 tablet by mouth Every 7 (Seven) Days.      amLODIPine (NORVASC) 5 MG tablet Take 1 tablet by mouth Daily.      aspirin 325 MG EC tablet Take 1 tablet by mouth every night at bedtime.      hydrALAZINE (APRESOLINE) 25 MG tablet Take 1 tablet by mouth 3 (Three) Times a Day.      levothyroxine (SYNTHROID, LEVOTHROID) 25 MCG tablet Take 1 tablet by mouth Daily.      linagliptin (TRADJENTA) 5 MG tablet tablet Take 1 tablet by mouth Daily.      metoprolol succinate XL (TOPROL-XL) 100 MG 24 hr tablet Take 1 tablet by mouth Daily.      montelukast (SINGULAIR) 10 MG tablet Take 1 tablet by mouth Every Night.      omeprazole (priLOSEC) 40 MG capsule Take 1 capsule by mouth Daily.      oxybutynin XL (DITROPAN-XL) 5 MG 24 hr tablet Take 1 tablet by mouth Daily.       No current facility-administered medications for this visit.       Patient has no known allergies.    Past Medical History:   Diagnosis Date    Arthritis     Chronic kidney disease 25    Diabetes mellitus     Disease of thyroid gland     Hyperlipidemia     Hypertension     PHT (pulmonary hypertension) 03/30/2023       Social History     Socioeconomic History    Marital status: Single   Tobacco Use    Smoking status: Never    Smokeless tobacco: Never   Vaping Use    Vaping status: Never Used   Substance and Sexual Activity    Alcohol use: No    Drug use: No    Sexual activity: Not Currently     Partners: Female     Birth control/protection: Hysterectomy       Family History   Problem Relation Age of Onset    Colon cancer Mother     Colon cancer Father     Colon cancer Brother     Heart attack Brother     Heart disease Brother        Review of Systems   Constitutional: Negative for malaise/fatigue and night sweats.  "  Cardiovascular:  Negative for chest pain, claudication, dyspnea on exertion, irregular heartbeat, leg swelling, near-syncope, orthopnea, palpitations and syncope.   Respiratory:  Negative for cough, shortness of breath and wheezing.    Musculoskeletal:  Negative for back pain, joint pain and stiffness.   Gastrointestinal:  Negative for anorexia, heartburn, nausea and vomiting.   Genitourinary:  Negative for dysuria, hematuria, hesitancy and nocturia.   Neurological:  Negative for dizziness, headaches and light-headedness.   Psychiatric/Behavioral:  Negative for depression and memory loss. The patient is not nervous/anxious.            Objective     /75 (BP Location: Left arm, Patient Position: Sitting, Cuff Size: Adult)   Pulse 68   Resp 18   Ht 144.8 cm (57\")   Wt 68.9 kg (152 lb)   BMI 32.89 kg/m²     Constitutional:       Appearance: Not in distress.   Eyes:      Pupils: Pupils are equal, round, and reactive to light.   HENT:      Nose: Nose normal.   Pulmonary:      Effort: Pulmonary effort is normal.      Breath sounds: Normal breath sounds.   Cardiovascular:      PMI at left midclavicular line. Normal rate. Regular rhythm.      Murmurs: There is a systolic murmur.   Abdominal:      Palpations: Abdomen is soft.   Musculoskeletal: Normal range of motion.      Cervical back: Normal range of motion and neck supple. Skin:     General: Skin is warm and dry.   Neurological:      Mental Status: Alert.         Procedures         Diagnoses and all orders for this visit:    1. Primary hypertension (Primary)    2. PHT (pulmonary hypertension)    3. CKD (chronic kidney disease) stage 4, GFR 15-29 ml/min    4. Obesity (BMI 30.0-34.9)             HTN: BP is stable, slight elevation noted, patient has not yet had any medication. Will continue with hydralazine, norvasc, and toprol at same. Limited sodium diet urged.    SOA/pulm HTN: No longer on oxygen therapy. Echo 2023 did not report RVSP. SOA improved. No " repeat echo urged.    CKD: Followed by renal, has an appt in two days. Continue with current BP med regimen.    Refills per request    BMI noted at 32.89, good cardiac renal diet urged.    6 month follow up urged, sooner if needed.              Problems Addressed this Visit          Cardiac and Vasculature    Primary hypertension - Primary    Relevant Medications    amLODIPine (NORVASC) 5 MG tablet    hydrALAZINE (APRESOLINE) 25 MG tablet       Pulmonary and Pneumonias    PHT (pulmonary hypertension)     Other Visit Diagnoses       CKD (chronic kidney disease) stage 4, GFR 15-29 ml/min        Obesity (BMI 30.0-34.9)              Diagnoses         Codes Comments    Primary hypertension    -  Primary ICD-10-CM: I10  ICD-9-CM: 401.9     PHT (pulmonary hypertension)     ICD-10-CM: I27.20  ICD-9-CM: 416.8     CKD (chronic kidney disease) stage 4, GFR 15-29 ml/min     ICD-10-CM: N18.4  ICD-9-CM: 585.4     Obesity (BMI 30.0-34.9)     ICD-10-CM: E66.811  ICD-9-CM: 278.00                       Electronically signed by JELENA Matthew November 12, 2024 10:05 EST

## 2025-04-01 ENCOUNTER — OFFICE VISIT (OUTPATIENT)
Dept: UROLOGY | Facility: CLINIC | Age: 80
End: 2025-04-01
Payer: MEDICARE

## 2025-04-01 VITALS
SYSTOLIC BLOOD PRESSURE: 149 MMHG | HEART RATE: 61 BPM | BODY MASS INDEX: 33.7 KG/M2 | HEIGHT: 57 IN | DIASTOLIC BLOOD PRESSURE: 74 MMHG | WEIGHT: 156.2 LBS

## 2025-04-01 DIAGNOSIS — N20.0 LEFT RENAL STONE: ICD-10-CM

## 2025-04-01 DIAGNOSIS — R31.29 MICROHEMATURIA: Primary | ICD-10-CM

## 2025-04-01 LAB
BILIRUB BLD-MCNC: NEGATIVE MG/DL
CLARITY, POC: CLEAR
COLOR UR: YELLOW
EXPIRATION DATE: ABNORMAL
GLUCOSE UR STRIP-MCNC: NEGATIVE MG/DL
KETONES UR QL: NEGATIVE
LEUKOCYTE EST, POC: NEGATIVE
Lab: ABNORMAL
NITRITE UR-MCNC: NEGATIVE MG/ML
PH UR: 6 [PH] (ref 5–8)
PROT UR STRIP-MCNC: ABNORMAL MG/DL
RBC # UR STRIP: NEGATIVE /UL
SP GR UR: 1.02 (ref 1–1.03)
UROBILINOGEN UR QL: NORMAL

## 2025-04-01 PROCEDURE — 99212 OFFICE O/P EST SF 10 MIN: CPT

## 2025-04-01 PROCEDURE — 1159F MED LIST DOCD IN RCRD: CPT

## 2025-04-01 PROCEDURE — 81003 URINALYSIS AUTO W/O SCOPE: CPT

## 2025-04-01 PROCEDURE — 3078F DIAST BP <80 MM HG: CPT

## 2025-04-01 PROCEDURE — 1160F RVW MEDS BY RX/DR IN RCRD: CPT

## 2025-04-01 PROCEDURE — 3077F SYST BP >= 140 MM HG: CPT

## 2025-04-01 NOTE — PROGRESS NOTES
"Chief Complaint:    Chief Complaint   Patient presents with    Microhematuria       Vital Signs:   /74 (BP Location: Right arm, Patient Position: Sitting, Cuff Size: Adult)   Pulse 61   Ht 144.8 cm (57.01\")   Wt 70.9 kg (156 lb 3.2 oz)   BMI 33.79 kg/m²   Body mass index is 33.79 kg/m².      HPI:  Aggie Perkins is a 79 y.o. female who presents today for follow up    History of Present Illness  Ms. Perkins returns to clinic today for follow-up for microscopic hematuria with nonobstructing large left renal calculi.  She was seen 6 months ago and had 1+ blood and microscopic urine analysis at that time.  She has since had 2 previous urine analysis and most recent 1 in March 11, 2025 that showed no concerns of any microscopic blood. microscopic urine showed 0-2 red blood cells.  Repeat urine analysis in office today shows a trace amount of protein only with no concerns of leukocytes or microscopic blood.  She denies any gross hematuria, back or flank pain, or difficulty with urination.  She continues with oxybutynin as prescribed.  Kidney function remained stable and she continues to follow with nephrology.      Past Medical History:  Past Medical History:   Diagnosis Date    Arthritis     Chronic kidney disease 25    Diabetes mellitus     Disease of thyroid gland     Hyperlipidemia     Hypertension     PHT (pulmonary hypertension) 03/30/2023       Current Meds:  Current Outpatient Medications   Medication Sig Dispense Refill    alendronate (FOSAMAX) 70 MG tablet Take 1 tablet by mouth Every 7 (Seven) Days.      amLODIPine (NORVASC) 5 MG tablet Take 1 tablet by mouth Daily.      aspirin 325 MG EC tablet Take 1 tablet by mouth every night at bedtime.      hydrALAZINE (APRESOLINE) 25 MG tablet Take 1 tablet by mouth 3 (Three) Times a Day.      levothyroxine (SYNTHROID, LEVOTHROID) 25 MCG tablet Take 1 tablet by mouth Daily.      linagliptin (TRADJENTA) 5 MG tablet tablet Take 1 tablet by mouth Daily.      metoprolol " succinate XL (TOPROL-XL) 100 MG 24 hr tablet Take 1 tablet by mouth Daily.      montelukast (SINGULAIR) 10 MG tablet Take 1 tablet by mouth Every Night.      omeprazole (priLOSEC) 40 MG capsule Take 1 capsule by mouth Daily.      oxybutynin XL (DITROPAN-XL) 5 MG 24 hr tablet Take 1 tablet by mouth Daily.       No current facility-administered medications for this visit.        Allergies:   No Known Allergies     Past Surgical History:  Past Surgical History:   Procedure Laterality Date    CARDIOVASCULAR STRESS TEST  03/20/2023    @ [facility]. . Lexiscan- EF 70%. negative    CHOLECYSTECTOMY      COLONOSCOPY  2015    COLONOSCOPY N/A 03/23/2018    Procedure: COLONOSCOPY CPT CODE: 72169;  Surgeon: Rinku Urias III, MD;  Location: [facility];  Service: Gastroenterology    ECHO - CONVERTED  03/06/2023    @ [facility]. - EF 65%SARITA-1.7 [id number]- 52 mmHg    ECHO - CONVERTED  11/07/2023    LVH. EF 60%. LA- 3.8. MVA-1.9. Trace-Mild MR & AI    ENDOSCOPY      ENDOSCOPY N/A 03/23/2018    Procedure: ESOPHAGOGASTRODUODENOSCOPY WITH BIOPSY CPT CODE: 68605;  Surgeon: Rinku Urias III, MD;  Location: [facility] OR;  Service: Gastroenterology    HYSTERECTOMY         Social History:  Social History     Socioeconomic History    Marital status: Single   Tobacco Use    Smoking status: Never    Smokeless tobacco: Never   Vaping Use    Vaping status: Never Used   Substance and Sexual Activity    Alcohol use: No    Drug use: No    Sexual activity: Not Currently     Partners: Female     Birth control/protection: Hysterectomy       Family History:  Family History   Problem Relation Age of Onset    Colon cancer Mother     Colon cancer Father     Colon cancer Brother     Heart attack Brother     Heart disease Brother        Review of Systems:  Review of Systems   Constitutional:  Negative for chills, fatigue and fever.   HENT:  Negative for congestion and sinus pressure.    Respiratory:  Negative for chest tightness and shortness  of breath.    Cardiovascular:  Negative for chest pain.   Gastrointestinal:  Negative for abdominal pain, constipation, diarrhea, nausea and vomiting.   Genitourinary:  Positive for frequency. Negative for difficulty urinating, dysuria, pelvic pain and urgency.   Musculoskeletal:  Positive for back pain. Negative for neck pain.   Allergic/Immunologic: Negative for food allergies.   Neurological:  Negative for dizziness and headaches.   Hematological:  Bruises/bleeds easily.   Psychiatric/Behavioral:  The patient is nervous/anxious.        Physical Exam:  Physical Exam  Constitutional:       General: She is not in acute distress.     Appearance: Normal appearance.   HENT:      Head: Normocephalic and atraumatic.      Nose: Nose normal.      Mouth/Throat:      Mouth: Mucous membranes are moist.   Eyes:      Conjunctiva/sclera: Conjunctivae normal.   Cardiovascular:      Rate and Rhythm: Normal rate.      Pulses: Normal pulses.   Pulmonary:      Effort: Pulmonary effort is normal.   Abdominal:      Palpations: Abdomen is soft.   Musculoskeletal:         General: Normal range of motion.      Cervical back: Normal range of motion.   Skin:     General: Skin is warm.   Neurological:      General: No focal deficit present.      Mental Status: She is alert and oriented to person, place, and time.   Psychiatric:         Mood and Affect: Mood normal.         Behavior: Behavior normal.         Thought Content: Thought content normal.         Judgment: Judgment normal.             Recent Image (CT and/or KUB):   CT Abdomen and Pelvis: No results found for this or any previous visit.     CT Stone Protocol: Results for orders placed during the hospital encounter of 09/21/24    CT Abdomen Pelvis Stone Protocol    Narrative  Comparison: July 3, 2024    Pneumobilia again demonstrated. Fatty transformation the pancreas is  seen with atrophy. Spleen and adrenal glands are unremarkable.  Nonobstructive 1.0 cm left renal stone.  Perinephric stranding  bilaterally appears chronic. No obstructive uropathy is identified. The  appendix is unremarkable. Mild fat containing umbilical hernia and  bilateral inguinal hernias. Diverticulosis is appreciated without  diverticulitis. Mild stool seen throughout the colon. No bowel  obstruction, free air or significant free fluid is identified.    Impression  Impression:  1. No acute process.      This report was finalized on 9/21/2024 1:57 PM by Jon Ayala DO.     KUB: No results found for this or any previous visit.       Labs:  Brief Urine Lab Results  (Last result in the past 365 days)        Color   Clarity   Blood   Leuk Est   Nitrite   Protein   CREAT   Urine HCG        04/01/25 1118 Yellow   Clear   Negative   Negative   Negative   Trace                 Office Visit on 04/01/2025   Component Date Value Ref Range Status    Color 04/01/2025 Yellow  Yellow, Straw, Dark Yellow, [place] Final    Clarity, UA 04/01/2025 Clear  Clear Final    Specific Gravity  04/01/2025 1.020  1.005 - 1.030 Final    pH, Urine 04/01/2025 6.0  5.0 - 8.0 Final    Leukocytes 04/01/2025 Negative  Negative Final    Nitrite, UA 04/01/2025 Negative  Negative Final    Protein, POC 04/01/2025 Trace (A)  Negative mg/dL Final    Glucose, UA 04/01/2025 Negative  Negative mg/dL Final    Ketones, UA 04/01/2025 Negative  Negative Final    Urobilinogen, UA 04/01/2025 Normal  Normal, 0.2 E.U./dL Final    Bilirubin 04/01/2025 Negative  Negative Final    Blood, UA 04/01/2025 Negative  Negative Final    Lot Number 04/01/2025 [id number]   Final    Expiration Date 04/01/2025 10/25/2025   Final        Procedure: None  Procedures     I have reviewed and agree with the above PMH, PSH, FMH, social history, medications, allergies, and labs.     Assessment/Plan:   Problem List Items Addressed This Visit       Microhematuria - Primary    Relevant Orders    POC Urinalysis Dipstick, Automated (Completed)    Left renal stone       [facility]  Maintenance:   Health Maintenance Due   Topic Date Due    DXA SCAN  Never done    DIABETIC FOOT EXAM  Never done    DIABETIC EYE EXAM  Never done    Pneumococcal Vaccine 50+ (1 of 2 - PCV) Never done    TDAP/TD VACCINES (1 - Tdap) Never done    ZOSTER VACCINE (1 of 2) Never done    HEPATITIS C SCREENING  Never done    ANNUAL WELLNESS VISIT  Never done    RSV Vaccine - Adults (1 - 1-dose 75+ series) Never done    LIPID PANEL  07/14/2024    COVID-19 Vaccine (4 - 2024-25 season) 09/01/2024    HEMOGLOBIN A1C  01/03/2025    URINE MICROALBUMIN-CREATININE RATIO (uACR)  03/21/2025        Smoking Counseling: Never smoked.  Never used smokeless tobacco.  Counseling given.    Urine Incontinence: Patient reports that she is having mild urinary incontinence with improvement on oxybutynin.    Patient was given instructions and counseling regarding her condition or for health maintenance advice. Please see specific information pulled into the AVS if appropriate.    Patient Education:   Microscopic hematuria patient has had several urine analysis with no concerns of any microscopic blood.  Urine in office today is completely unremarkable and recommended proceed forward with observation.   Left renal stone -patient has a large nonobstructing left renal calculi and kidney function remained stable.  Recommend proceed for with observation if she has any worsening symptoms to return to clinic.  She verbalized understanding.  Otherwise we will see her back as needed    Visit Diagnoses:    ICD-10-CM ICD-9-CM   1. Microhematuria  R31.29 599.72   2. Left renal stone  N20.0 592.0     A total of 15 minutes were spent coordinating this patient’s care in clinic today; 10 minutes of which were face-to-face with the patient, reviewing medical history and counseling on the current treatment and followup plan.  All questions were answered to patient's satisfaction.    Meds Ordered During Visit:  No orders of the defined types were placed in this  encounter.      Follow Up Appointment: As needed  No follow-ups on file.      This document has been electronically signed by Mike Ramos PA-C   April 1, 2025 12:56 EDT    Part of this note may be an electronic transcription/translation of spoken language to printed text using the Dragon Dictation System.

## 2025-05-27 ENCOUNTER — OFFICE VISIT (OUTPATIENT)
Dept: CARDIOLOGY | Facility: CLINIC | Age: 80
End: 2025-05-27
Payer: MEDICARE

## 2025-05-27 VITALS
HEIGHT: 57 IN | WEIGHT: 157 LBS | BODY MASS INDEX: 33.87 KG/M2 | SYSTOLIC BLOOD PRESSURE: 122 MMHG | DIASTOLIC BLOOD PRESSURE: 62 MMHG | HEART RATE: 76 BPM

## 2025-05-27 DIAGNOSIS — I27.20 PHT (PULMONARY HYPERTENSION): ICD-10-CM

## 2025-05-27 DIAGNOSIS — E66.811 OBESITY (BMI 30.0-34.9): ICD-10-CM

## 2025-05-27 DIAGNOSIS — E78.00 HYPERCHOLESTEREMIA: ICD-10-CM

## 2025-05-27 DIAGNOSIS — N18.4 TYPE 2 DIABETES MELLITUS WITH STAGE 4 CHRONIC KIDNEY DISEASE, WITHOUT LONG-TERM CURRENT USE OF INSULIN: ICD-10-CM

## 2025-05-27 DIAGNOSIS — E11.22 TYPE 2 DIABETES MELLITUS WITH STAGE 4 CHRONIC KIDNEY DISEASE, WITHOUT LONG-TERM CURRENT USE OF INSULIN: ICD-10-CM

## 2025-05-27 DIAGNOSIS — I10 PRIMARY HYPERTENSION: Primary | ICD-10-CM

## 2025-05-27 PROCEDURE — 3074F SYST BP LT 130 MM HG: CPT | Performed by: NURSE PRACTITIONER

## 2025-05-27 PROCEDURE — 99213 OFFICE O/P EST LOW 20 MIN: CPT | Performed by: NURSE PRACTITIONER

## 2025-05-27 PROCEDURE — 3078F DIAST BP <80 MM HG: CPT | Performed by: NURSE PRACTITIONER

## 2025-05-27 RX ORDER — ATORVASTATIN CALCIUM 10 MG/1
10 TABLET, FILM COATED ORAL DAILY
COMMUNITY
Start: 2025-05-25

## 2025-05-27 NOTE — PROGRESS NOTES
Chief Complaint   Patient presents with    Follow-up     For cardiac management, doing well, SOB is much better. Sent her O2 back, hadn't used in over 6 months.     Med Refill     PCP writes all refills.     Labs     Most recent labs from April in chart, saw nephrologist in March.     Medication changes     Now on atorvastatin, they are not sure who added it, it was not on her previous list. Niece with patient, she went over our med list.        Cardiac Complaints  none      Subjective   Aggie Perkins is a 79 y.o. female with HTN, DM, hypothyroidism, CKD, and hypercholesterolemia. In early 2023 she was hospitalized, found to be hypoxic and treated with diuretics. Echocardiogram read by another cardiologist showed mild AS and mild PTH. Stress test per radiologist reported as normal. Sildenafil initially prescribed but insurance did not cover. Beta-blocker dose increased. On 11/7/2023 echocardiogram showed mild to moderate LVH with a EF around 56-60%. Renal US in Feb 2024 neg for flow limiting stenosis.     She returns today for follow up and new concerns are denied. No CP, SOA, palpitations, dizziness, or syncope noted. She admits SOA has greatly improved, no longer using oxygen at night. Niece who accompanies her states since she has her medication more routine, everything has been much better. She is now following with renal for CKD. Labs from 4/2025 showed: AIC 6.5%, Na 138, K 5.3, BUN 40, Creatinine 1.9, GFR 26 improved from 23, Mag 1.8, TSH 3.7, Vit D 36, HH 10/33. Refills requested. Now on statin therapy, but unsure if PCP added. No refills needed.          Cardiac History  Past Surgical History:   Procedure Laterality Date    CARDIOVASCULAR STRESS TEST  03/20/2023    @ [facility]. . Lexiscan- EF 70%. negative    CHOLECYSTECTOMY      COLONOSCOPY  2015    COLONOSCOPY N/A 03/23/2018    Procedure: COLONOSCOPY CPT CODE: 72691;  Surgeon: Rinku Urias III, MD;  Location: [facility] OR;  Service: Gastroenterology     ECHO - CONVERTED  03/06/2023    @ [facility]. - EF 65%SARITA-1.7 [id number]- 52 mmHg    ECHO - CONVERTED  11/07/2023    LVH. EF 60%. LA- 3.8. MVA-1.9. Trace-Mild MR & AI    ENDOSCOPY      ENDOSCOPY N/A 03/23/2018    Procedure: ESOPHAGOGASTRODUODENOSCOPY WITH BIOPSY CPT CODE: 15397;  Surgeon: Rinku Urias III, MD;  Location: [facility];  Service: Gastroenterology    HYSTERECTOMY         Current Outpatient Medications   Medication Sig Dispense Refill    alendronate (FOSAMAX) 70 MG tablet Take 1 tablet by mouth Every 7 (Seven) Days.      amLODIPine (NORVASC) 5 MG tablet Take 1 tablet by mouth Daily.      aspirin 325 MG EC tablet Take 1 tablet by mouth every night at bedtime.      atorvastatin (LIPITOR) 10 MG tablet Take 1 tablet by mouth Daily.      hydrALAZINE (APRESOLINE) 25 MG tablet Take 1 tablet by mouth 3 (Three) Times a Day.      levothyroxine (SYNTHROID, LEVOTHROID) 25 MCG tablet Take 1 tablet by mouth Daily.      linagliptin (TRADJENTA) 5 MG tablet tablet Take 1 tablet by mouth Daily.      metoprolol succinate XL (TOPROL-XL) 100 MG 24 hr tablet Take 1 tablet by mouth Daily.      montelukast (SINGULAIR) 10 MG tablet Take 1 tablet by mouth Every Night.      omeprazole (priLOSEC) 40 MG capsule Take 1 capsule by mouth Daily.      oxybutynin XL (DITROPAN-XL) 5 MG 24 hr tablet Take 1 tablet by mouth Daily.       No current facility-administered medications for this visit.       Patient has no known allergies.    Past Medical History:   Diagnosis Date    Arthritis     Chronic kidney disease 25    Diabetes mellitus     Disease of thyroid gland     Hyperlipidemia     Hypertension     PHT (pulmonary hypertension) 03/30/2023       Social History     Socioeconomic History    Marital status: Single   Tobacco Use    Smoking status: Never    Smokeless tobacco: Never   Vaping Use    Vaping status: Never Used   Substance and Sexual Activity    Alcohol use: No    Drug use: No    Sexual activity: Not Currently      "Partners: Female     Birth control/protection: Hysterectomy       Family History   Problem Relation Age of Onset    Colon cancer Mother     Colon cancer Father     Colon cancer Brother     Heart attack Brother     Heart disease Brother        Review of Systems   Constitutional: Negative for malaise/fatigue and night sweats.   Cardiovascular:  Negative for chest pain, claudication, dyspnea on exertion, irregular heartbeat, leg swelling, near-syncope, orthopnea, palpitations and syncope.   Respiratory:  Negative for cough, shortness of breath and wheezing.    Musculoskeletal:  Positive for stiffness. Negative for back pain and joint pain.   Gastrointestinal:  Negative for anorexia, heartburn, nausea and vomiting.   Genitourinary:  Negative for dysuria, hematuria, hesitancy and nocturia.   Neurological:  Negative for dizziness, light-headedness and loss of balance.   Psychiatric/Behavioral:  Negative for depression and memory loss. The patient is not nervous/anxious.            Objective     /62   Pulse 76   Ht 144.8 cm (57\")   Wt 71.2 kg (157 lb)   BMI 33.97 kg/m²     Constitutional:       Appearance: Not in distress.   Eyes:      Pupils: Pupils are equal, round, and reactive to light.   HENT:      Nose: Nose normal.   Pulmonary:      Effort: Pulmonary effort is normal.      Breath sounds: Normal breath sounds.   Cardiovascular:      PMI at left midclavicular line. Normal rate. Regular rhythm.      Murmurs: There is a systolic murmur.   Musculoskeletal: Normal range of motion.      Cervical back: Normal range of motion and neck supple. Skin:     General: Skin is warm and dry.   Neurological:      Mental Status: Alert and oriented to person, place and time.         Procedures         Diagnoses and all orders for this visit:    1. Primary hypertension (Primary)    2. Hypercholesteremia    3. PHT (pulmonary hypertension)    4. Type 2 diabetes mellitus with stage 4 chronic kidney disease, without long-term " current use of insulin    5. Obesity (BMI 30.0-34.9)             HTN: BP is stable, medication continued at same. No adjustment to hydralazine, norvasc, or toprol urged. Limited sodium diet urged.     SOA/pulm HTN: No longer on oxygen therapy. Echo 2023 did not report RVSP. SOA improved. No repeat echo urged. No longer using oxygen.     DM: Well controlled. AIC of 6.5%.     CKD: Followed by renal. Continue with current BP med regimen.     Refills with PCP     BMI noted at 33.97, good cardiac renal diet urged.     6 month follow up urged, sooner if needed.         Problems Addressed this Visit          Cardiac and Vasculature    Hypercholesteremia    Relevant Medications    atorvastatin (LIPITOR) 10 MG tablet    Primary hypertension - Primary       Endocrine and Metabolic    Type 2 diabetes mellitus without complication, without long-term current use of insulin       Pulmonary and Pneumonias    PHT (pulmonary hypertension)     Other Visit Diagnoses         Obesity (BMI 30.0-34.9)              Diagnoses         Codes Comments      Primary hypertension    -  Primary ICD-10-CM: I10  ICD-9-CM: 401.9       Hypercholesteremia     ICD-10-CM: E78.00  ICD-9-CM: 272.0       PHT (pulmonary hypertension)     ICD-10-CM: I27.20  ICD-9-CM: 416.8       Type 2 diabetes mellitus with stage 4 chronic kidney disease, without long-term current use of insulin     ICD-10-CM: E11.22, N18.4  ICD-9-CM: 250.40, 585.4       Obesity (BMI 30.0-34.9)     ICD-10-CM: E66.811  ICD-9-CM: 278.00                           Electronically signed by JELENA Matthew May 27, 2025 09:09 EDT

## (undated) DEVICE — TUBING, SUCTION, 1/4" X 20', STRAIGHT: Brand: MEDLINE INDUSTRIES, INC.

## (undated) DEVICE — FRCP BX RADJAW4 NDL 2.8 240CM LG OG BX40

## (undated) DEVICE — CONN Y IRR DISP 1P/U

## (undated) DEVICE — Device: Brand: ENDOGATOR

## (undated) DEVICE — Device

## (undated) DEVICE — THE BITE BLOCK MAXI, LATEX FREE STRAP IS USED TO PROTECT THE ENDOSCOPE INSERTION TUBE FROM BEING BITTEN BY THE PATIENT.

## (undated) DEVICE — GOWN,REINF,POLY,ECL,PP SLV,XL: Brand: MEDLINE

## (undated) DEVICE — SYR LUERLOK 30CC

## (undated) DEVICE — SINGLE PORT MANIFOLD: Brand: NEPTUNE 2

## (undated) DEVICE — Device: Brand: DEFENDO AIR/WATER/SUCTION AND BIOPSY VALVE